# Patient Record
Sex: FEMALE | Race: WHITE | NOT HISPANIC OR LATINO | Employment: OTHER | URBAN - METROPOLITAN AREA
[De-identification: names, ages, dates, MRNs, and addresses within clinical notes are randomized per-mention and may not be internally consistent; named-entity substitution may affect disease eponyms.]

---

## 2017-05-15 ENCOUNTER — HOSPITAL ENCOUNTER (OUTPATIENT)
Dept: RADIOLOGY | Facility: MEDICAL CENTER | Age: 62
Discharge: HOME | End: 2017-05-15
Payer: COMMERCIAL

## 2017-05-15 DIAGNOSIS — Z12.31 ENCOUNTER FOR SCREENING MAMMOGRAM FOR MALIGNANT NEOPLASM OF BREAST: ICD-10-CM

## 2017-05-15 PROCEDURE — G0202 SCR MAMMO BI INCL CAD: HCPCS

## 2017-11-09 ENCOUNTER — ALLSCRIPTS OFFICE VISIT (OUTPATIENT)
Dept: OTHER | Facility: OTHER | Age: 62
End: 2017-11-09

## 2017-11-09 ENCOUNTER — TRANSCRIBE ORDERS (OUTPATIENT)
Dept: ADMINISTRATIVE | Facility: HOSPITAL | Age: 62
End: 2017-11-09

## 2017-11-09 DIAGNOSIS — Z12.39 BREAST SCREENING: Primary | ICD-10-CM

## 2017-11-10 NOTE — PROGRESS NOTES
Assessment    1  Fibrocystic breast disease, unspecified laterality (610 1) (N60 19)   2  Encounter for screening mammogram for malignant neoplasm of breast (V76 12) (Z12 31)    Plan  Encounter for screening mammogram for malignant neoplasm of breast    · * MAMMO SCREENING BILATERAL W CAD; Status:Active; Requested for:[id number]:00AM;    Perform:[facility] Radiology; Order Comments:[address]; JFE:[id number]; Last Updated By:Sample, April; 2017 3:18:10 PM;Ordered;for screening mammogram for malignant neoplasm of breast; Ordered By:Paulino Nath; Fibrocystic breast disease, unspecified laterality    · Follow-up visit in 1 year Evaluation and Treatment  Follow-up  Status: Complete  Done:2018 11:15AM   Ordered;Fibrocystic breast disease, unspecified laterality; Ordered By: Allison Chavez Performed:  Due: 52KRM6065; Last Updated By: Sample, April; 2017 3:21:40 PM    Discussion/Summary  61 yo female with fibrocystic changes  No concerns on exam today  Her last mammogram was benign  I will make arrangements for her next mammogram due in May  I will plan on seeing her on an annual basis or sooner should the need arise  Chief Complaint  Chief Complaint Free Text Note Form: Pt is here for her 1 year breast follow-up  new complaints at this time  imagin/15/17 radha scrn mammo (B2)  Raffaele and Dr Stephanie Kaiser  History of Present Illness  Diagnosis and Staging: fibrocystic changes      Review of Systems  Complete Female ROS SurgOnc:  Constitutional: The patient denies new or recent history of general fatigue, no recent weight loss, no change in appetite  Eyes: No complaints of visual problems, no scleral icterus  ENT: no complaints of ear pain, no hoarseness, no difficulty swallowing,-- no tinnitus-- and-- no new masses in head, oral cavity, or neck  Cardiovascular: No complaints of chest pain, no palpitations, no ankle edema  Respiratory: No complaints of shortness of breath, no cough  Gastrointestinal: No complaints of jaundice, no bloody stools, no pale stools  Genitourinary: No complaints of dysuria, no hematuria, no nocturia, no frequent urination, no urethral discharge  Musculoskeletal: No complaints of weakness, paralysis, joint stiffness or arthralgias,  Integumentary: No complaints of rash, no new lesions  Neurological: No complaints of convulsions, no seizures, no dizziness  Hematologic/Lymphatic: No complaints of easy bruising  Active Problems    1  Acute sinusitis (461 9) (J01 90)   2  Acute URI (465 9) (J06 9)   3  Atypical chest pain (786 59) (R07 89)   4  Chest pain (786 50) (R07 9)   5  Contusion of rib on left side, initial encounter (922 1) (S20 212A)   6  Ear pain (388 70) (H92 09)   7  Encounter for screening mammogram for malignant neoplasm of breast (V76 12) (Z12 31)   8  Fibrocystic breast disease, unspecified laterality (610 1) (N60 19)   9  Rib pain on left side (786 50) (R07 81)   10  Shortness of breath on exertion (786 05) (R06 02)    Past Medical History  1  History of Abnormal findings on diagnostic imaging of breast (793 89) (R92 8)   2  History of Age At First Period 14 Years Old (Menarche)   3  History of Age At First Pregnancy 23 Years Old   4  History of Arthritis (V13 4)   5  History of psoriasis (V13 3) (Z87 2)   6  History of viral conjunctivitis (V12 49) (Z86 69)   7  History of Osteoporosis (733 00) (M81 0)   8  History of Palpitations (785 1) (R00 2)   9  History of Previously Pregnant With 2  Term Deliveries    Surgical History  1  History of Foot Surgery  Surgical History Reviewed: The surgical history was reviewed and updated today  Family History  Mother    1  Family history of No history of cancer  Father    2  Family history of Skin Cancer (V16 8)  Brother    3  Family history of    4  Family history of myocardial infarction (V17 3) (Z82 49)  Maternal Grandfather    5   Family history of Colon Cancer (V16 0)  Maternal Uncle 6  Family history of Lung Cancer (V16 1)   7  Family history of Lung Cancer (V16 1)  Family History Reviewed: The family history was reviewed and updated today  Social History     · Being A Social Drinker   · Never A Smoker   · No drug use  Social History Reviewed: The social history was reviewed and updated today  The social history was reviewed and is unchanged  Current Meds   1  [employer] Vitamin D3 CAPS; Therapy: (Recorded:18Jun2015) to Recorded   2  Fluticasone Propionate 50 MCG/ACT Nasal Suspension; USE 1 SPRAY IN EACH NOSTRIL TWICE DAILY; Therapy: 79Bzy8240 to (Last Rx:[id number])  Requested for: 90Ugg2270 Ordered  Medication List Reviewed: The medication list was reviewed and updated today  Allergies  1  No Known Drug Allergies    Vitals  Vital Signs    Recorded: 57OSV5255 02:43PM   Temperature 97 8 F   Heart Rate 87   Respiration 15   Systolic 697   Diastolic 78   Height 5 ft 4 in   Weight 158 lb    BMI Calculated 27 12   BSA Calculated 1 78   O2 Saturation 99       Physical Exam   Constitutional: General appearance: The Patient is well-developed, well-nourished female who appears her stated age in no acute distress  She is pleasant and talkative  Neuro: Grossly nonfocal  -- Orientation to person, place and time: Normal  -- Mood and affect: Normal    Lymphatic: no evidence of cervical adenopathy bilaterally  -- no evidence of axillary adenopathy bilaterally  Skin: Examination of Breast: Abnormal   Breast: Examination of both breasts revealed fibrocystic changes  Examination of the right breast revealed no erythema,-- no swelling-- and-- no tenderness  Examination of the left breast revealed no erythema,-- no swelling-- and-- no tenderness  Nipples appeared normal No discharge was noted from the nipples  No breast masses were palpable  Axillary nodes: no enlarged nodes        Results/Data  Diagnostic Studies Reviewed Surg Onc:  X-ray Review 5/15/17 bilateral screening mammogram benign; category 2  Future Appointments    Date/Time Provider Specialty Site   11/08/2018 11:15 AM YAS Green  Surgical Oncology [facility]     End of Encounter Meds    1  Fluticasone Propionate 50 MCG/ACT Nasal Suspension; USE 1 SPRAY IN EACH NOSTRIL TWICE DAILY; Therapy: 51Qyr0486 to (Last Rx:[id number])  Requested for: 38Oju0548 Ordered    2  [employer]S Vitamin D3 CAPS;  Therapy: (Recorded:61Hzr1755) to Recorded    Signatures   Electronically signed by : YAS Sanchez ; Nov 9 2017  3:30PM EST                       (Author)

## 2018-01-13 VITALS
HEIGHT: 64 IN | OXYGEN SATURATION: 99 % | RESPIRATION RATE: 15 BRPM | BODY MASS INDEX: 26.98 KG/M2 | TEMPERATURE: 97.8 F | DIASTOLIC BLOOD PRESSURE: 78 MMHG | SYSTOLIC BLOOD PRESSURE: 120 MMHG | WEIGHT: 158 LBS | HEART RATE: 87 BPM

## 2018-01-18 NOTE — PROGRESS NOTES
Assessment    1  Contusion of rib on left side, initial encounter (922 1) (S23 302A)    Plan   Contusion of rib on left side, initial encounter    · Acetaminophen-Codeine 300-30 MG Oral Tablet (Tylenol with Codeine #3); TAKE 1  TABLET 3 TIMES DAILY AS NEEDED FOR PAIN    XR RIBS 2 VIEW LEFT; Status:Resulted - Requires Verification,Retrospective By Protocol Authorization;   Done: 79IJD7454 12:00AM  KQB:[id number];ZHXROLG; For:Rib pain on left side; Ordered By:Binta Fleming;      Discussion/Summary  Discussion Summary:   1  ICE to area 10-15min 3-4x daily  2  May use Tylenol with Codeine 1 every 6-8 hours as needed for pain  3  Encourage deep breathing periodically to prevent pneumonia  4  Follow-up with PCP if symptoms worsen  Medication Side Effects Reviewed: Possible side effects of new medications were reviewed with the patient/guardian today  Understands and agrees with treatment plan: The treatment plan was reviewed with the patient/guardian  The patient/guardian understands and agrees with the treatment plan      Chief Complaint    1  Pain  Chief Complaint Free Text Note Form: slipped today in parking lot on wet cardboard, fell on left side landed on left arm, has pain under left breast      History of Present Illness  HPI: Patient is a 50-year-old female presents with left rib and chest pain after a fall or this morning  She states she slipped on a piece of cardboard fell on her left side, she complained of immediate pain in her left rib and chest areas  The patient denies any shortness of breath or difficulty breathing  Hospital Based Practices Required Assessment:   Pain Assessment   the patient states they have pain  The pain is located in the left chest wall  The patient describes the pain as dull and aching  (on a scale of 0 to 10, the patient rates the pain at 8 )   Abuse And Domestic Violence Screen    Yes, the patient is safe at home  The patient states no one is hurting them      Depression And Suicide Screen  No, the patient has not had thoughts of hurting themself  Yes, the patient has felt depressed in the past 7 days  Prefered Language is  english  Primary Language is  english  Readiness To Learn: Receptive  Barriers To Learning: none  Preferred Learning: verbal   Education Completed: equipment/supplies   Teaching Method: verbal   Person Taught: patient   Evaluation Of Learning: verbalized/demonstrated understanding      Review of Systems  Focused-Female:   Constitutional: No fever, no chills, feels well, no tiredness, no recent weight gain or loss  Respiratory: no shortness of breath, no cough and no shortness of breath during exertion  Musculoskeletal: no arthralgias, no joint swelling, no myalgias and no joint stiffness  Integumentary: no skin lesions and no skin wound  Active Problems    1  Acute sinusitis (461 9) (J01 90)   2  Acute URI (465 9) (J06 9)   3  Encounter for screening mammogram for malignant neoplasm of breast (V76 12)   (Z12 31)   4  Fibrocystic breast disease, unspecified laterality (610 1) (N60 19)    Past Medical History    1  History of Abnormal findings on diagnostic imaging of breast (793 89) (R92 8)   2  History of Age At First Period 14 Years Old (Menarche)   3  History of Age At First Pregnancy 23 Years Old   4  History of Arthritis (V13 4)   5  History of psoriasis (V13 3) (Z87 2)   6  History of viral conjunctivitis (V12 49) (Z86 69)   7  History of Osteoporosis (733 00) (M81 0)   8  History of Palpitations (785 1) (R00 2)   9  History of Previously Pregnant With 2  Term Deliveries  Active Problems And Past Medical History Reviewed: The active problems and past medical history were reviewed and updated today  Family History    1  Family history of No history of cancer    2  Family history of Skin Cancer (V16 8)    3  Family history of    4  Family history of myocardial infarction (V17 3) (Z82 49)    5   Family history of Colon Cancer (V16 0)    6  Family history of Lung Cancer (V16 1)   7  Family history of Lung Cancer (V16 1)  Family History Reviewed: The family history was reviewed and updated today  Social History    · Being A Social Drinker   · Never A Smoker   · No drug use  Social History Reviewed: The social history was reviewed and updated today  Surgical History    1  History of Foot Surgery  Surgical History Reviewed: The surgical history was reviewed and updated today  Current Meds   1  CVS Vitamin D3 CAPS; Therapy: (Recorded:18Jun2015) to Recorded   2  Magnesium TABS; Therapy: (Recorded:16Jan2016) to Recorded  Medication List Reviewed: The medication list was reviewed and updated today  Allergies    1  No Known Drug Allergies    Vitals  Signs [Data Includes: Current Encounter]   Recorded: 61CEQ1738 12:16PM   Temperature: 97 6 F  Heart Rate: 101  Respiration: 18  Systolic: 387  Diastolic: 99  Height: 5 ft 4 in  Weight: 152 lb   BMI Calculated: 26 09  BSA Calculated: 1 74  O2 Saturation: 99  Pain Scale: 8/10    Physical Exam    Constitutional   General appearance: No acute distress, well appearing and well nourished  Ears, Nose, Mouth, and Throat   External inspection of ears and nose: Normal     Otoscopic examination: Tympanic membranes translucent with normal light reflex  Canals patent without erythema  Nasal mucosa, septum, and turbinates: Normal without edema or erythema  Oropharynx: Normal with no erythema, edema, exudate or lesions  Pulmonary   Respiratory effort: No increased work of breathing or signs of respiratory distress  Auscultation of lungs: Clear to auscultation  Cardiovascular   Auscultation of heart: Normal rate and rhythm, normal S1 and S2, without murmurs  Musculoskeletal   Inspection/palpation of joints, bones, and muscles: Abnormal   Tenderness to palpation over the sternum and rib areas 6 and 7, no palpable crepitus or visible swelling  Results/Data  Diagnostic Studies Reviewed: I personally reviewed the films/images/results in the office today  My interpretation follows  X-ray Review X-ray the rib area reveals no acute fractures, results were confirmed by radiology  Future Appointments    Date/Time Provider Specialty Site   07/11/2016 08:15 AM YAS Bee   Surgical Oncology [facility]     Signatures   Electronically signed by : Adry Moctezuma, [facility]; Jan 16 2016  1:47PM EST                       (Author)    Electronically signed by : VLADIMIR Bales ; Jan 18 2016 10:44AM EST                       (Co-author)

## 2018-02-28 ENCOUNTER — OPTICAL OFFICE (OUTPATIENT)
Dept: URBAN - METROPOLITAN AREA CLINIC 146 | Facility: CLINIC | Age: 63
Setting detail: OPHTHALMOLOGY
End: 2018-02-28
Payer: COMMERCIAL

## 2018-02-28 ENCOUNTER — RX ONLY (RX ONLY)
Age: 63
End: 2018-02-28

## 2018-02-28 ENCOUNTER — DOCTOR'S OFFICE (OUTPATIENT)
Dept: URBAN - METROPOLITAN AREA CLINIC 137 | Facility: CLINIC | Age: 63
Setting detail: OPHTHALMOLOGY
End: 2018-02-28
Payer: COMMERCIAL

## 2018-02-28 DIAGNOSIS — H52.4: ICD-10-CM

## 2018-02-28 DIAGNOSIS — H52.223: ICD-10-CM

## 2018-02-28 PROCEDURE — 92004 COMPRE OPH EXAM NEW PT 1/>: CPT | Performed by: OPHTHALMOLOGY

## 2018-02-28 PROCEDURE — V2744 TINT PHOTOCHROMATIC LENS/ES: HCPCS | Performed by: OPHTHALMOLOGY

## 2018-02-28 PROCEDURE — V2025 EYEGLASSES DELUX FRAMES: HCPCS | Performed by: OPHTHALMOLOGY

## 2018-02-28 PROCEDURE — V2799 MISC VISION ITEM OR SERVICE: HCPCS | Performed by: OPHTHALMOLOGY

## 2018-02-28 ASSESSMENT — SPHEQUIV_DERIVED
OS_SPHEQUIV: 1.75
OD_SPHEQUIV: 1.875

## 2018-02-28 ASSESSMENT — CONFRONTATIONAL VISUAL FIELD TEST (CVF)
OS_FINDINGS: FULL
OD_FINDINGS: FULL

## 2018-02-28 ASSESSMENT — KERATOMETRY
OS_AXISANGLE_DEGREES: 042
OS_K2POWER_DIOPTERS: 44.00
OD_K1POWER_DIOPTERS: 42.00
OS_K1POWER_DIOPTERS: 43.75
OD_K2POWER_DIOPTERS: 44.00
OD_AXISANGLE_DEGREES: 093

## 2018-02-28 ASSESSMENT — REFRACTION_MANIFEST
OS_VA3: 20/
OU_VA: 20/
OD_VA1: 20/
OU_VA: 20/
OD_VA3: 20/
OS_VA3: 20/
OD_VA2: 20/
OS_VA2: 20/
OS_VA1: 20/
OD_VA1: 20/
OS_VA1: 20/
OD_VA3: 20/
OD_VA2: 20/
OS_VA2: 20/

## 2018-02-28 ASSESSMENT — REFRACTION_CURRENTRX
OS_VPRISM_DIRECTION: BF
OS_OVR_VA: 20/
OD_OVR_VA: 20/
OD_AXIS: 173
OD_CYLINDER: +0.50
OS_OVR_VA: 20/
OD_OVR_VA: 20/
OD_VPRISM_DIRECTION: BF
OS_OVR_VA: 20/
OD_OVR_VA: 20/
OS_AXIS: 176
OD_SPHERE: +2.75
OS_CYLINDER: +0.75
OS_SPHERE: +0.75

## 2018-02-28 ASSESSMENT — REFRACTION_OUTSIDERX
OU_VA: 20/20-1
OS_AXIS: 155
OS_ADD: +2.25
OD_AXIS: 180
OS_CYLINDER: +0.50
OS_VA2: 20/25(J1)
OS_SPHERE: +1.50
OD_VA1: 20/20-2
OS_VA3: 20/
OD_SPHERE: +1.50
OS_VA1: 20/30-2
OD_CYLINDER: +0.50
OD_VA3: 20/
OD_ADD: +2.25
OD_VA2: 20/25(J1)

## 2018-02-28 ASSESSMENT — REFRACTION_AUTOREFRACTION
OS_SPHERE: +1.50
OS_AXIS: 153
OD_SPHERE: +1.50
OS_CYLINDER: +0.50
OD_AXIS: 087
OD_CYLINDER: +0.75

## 2018-02-28 ASSESSMENT — AXIALLENGTH_DERIVED
OD_AL: 23.0574
OS_AL: 22.7999

## 2018-02-28 ASSESSMENT — CORNEAL PTERYGIUM: OD_PTERYGIUM: NASAL 2MM

## 2018-02-28 ASSESSMENT — VISUAL ACUITY
OD_BCVA: 20/40
OS_BCVA: 20/40

## 2018-03-16 ENCOUNTER — DOCTOR'S OFFICE (OUTPATIENT)
Dept: URBAN - METROPOLITAN AREA CLINIC 137 | Facility: CLINIC | Age: 63
Setting detail: OPHTHALMOLOGY
End: 2018-03-16
Payer: COMMERCIAL

## 2018-03-16 DIAGNOSIS — H52.4: ICD-10-CM

## 2018-03-16 PROCEDURE — NCRX N/C GLASSES RX: Performed by: OPHTHALMOLOGY

## 2018-03-16 ASSESSMENT — REFRACTION_OUTSIDERX
OS_AXIS: 180
OD_SPHERE: +1.00
OD_ADD: +2.25
OD_CYLINDER: +0.50
OS_VA3: 20/
OD_VA2: 20/25(J1)
OS_VA1: 20/30-2
OD_VA1: 20/20-2
OD_VA2: 20/25(J1)
OS_VA1: 20/25
OS_SPHERE: +1.50
OD_VA3: 20/
OD_AXIS: 180
OS_VA2: 20/25(J1)
OS_AXIS: 155
OD_CYLINDER: +0.50
OS_ADD: +2.25
OS_CYLINDER: +0.50
OU_VA: 20/20
OD_VA3: 20/
OD_AXIS: 180
OS_VA3: 20/
OD_VA1: 20/25
OD_ADD: +2.25
OU_VA: 20/20-1
OS_CYLINDER: +0.50
OS_ADD: +2.25
OD_SPHERE: +1.50
OS_VA2: 20/25(J1)
OS_SPHERE: +1.50

## 2018-03-16 ASSESSMENT — REFRACTION_CURRENTRX
OD_OVR_VA: 20/
OD_OVR_VA: 20/
OS_OVR_VA: 20/
OS_AXIS: 176
OS_SPHERE: +0.75
OD_AXIS: 173
OD_CYLINDER: +0.50
OD_OVR_VA: 20/
OS_OVR_VA: 20/
OS_OVR_VA: 20/
OD_SPHERE: +2.75
OD_VPRISM_DIRECTION: BF
OS_VPRISM_DIRECTION: BF
OS_CYLINDER: +0.75

## 2018-03-16 ASSESSMENT — VISUAL ACUITY
OD_BCVA: 20/30-2
OS_BCVA: 20/30

## 2018-03-16 ASSESSMENT — REFRACTION_AUTOREFRACTION
OS_CYLINDER: +0.50
OS_SPHERE: +1.50
OD_SPHERE: +1.50
OD_CYLINDER: +0.75
OS_AXIS: 153
OD_AXIS: 087

## 2018-03-16 ASSESSMENT — REFRACTION_MANIFEST
OS_VA1: 20/
OS_VA2: 20/
OD_VA1: 20/
OD_VA3: 20/
OS_VA3: 20/
OU_VA: 20/
OD_VA2: 20/

## 2018-03-16 ASSESSMENT — SPHEQUIV_DERIVED
OD_SPHEQUIV: 1.875
OS_SPHEQUIV: 1.75

## 2018-03-30 ENCOUNTER — DOCTOR'S OFFICE (OUTPATIENT)
Dept: URBAN - METROPOLITAN AREA CLINIC 137 | Facility: CLINIC | Age: 63
Setting detail: OPHTHALMOLOGY
End: 2018-03-30
Payer: COMMERCIAL

## 2018-03-30 DIAGNOSIS — G43.909: ICD-10-CM

## 2018-03-30 PROCEDURE — 92083 EXTENDED VISUAL FIELD XM: CPT | Performed by: OPHTHALMOLOGY

## 2018-04-19 ENCOUNTER — DOCTOR'S OFFICE (OUTPATIENT)
Dept: URBAN - METROPOLITAN AREA CLINIC 137 | Facility: CLINIC | Age: 63
Setting detail: OPHTHALMOLOGY
End: 2018-04-19
Payer: COMMERCIAL

## 2018-04-19 DIAGNOSIS — H52.4: ICD-10-CM

## 2018-04-19 PROBLEM — G43.909 MIGRAINE, UNSPECIFIED, NOT INTRACTABLE, WITHOUT STATUS MIGRAINOSUS: Status: ACTIVE | Noted: 2018-02-28

## 2018-04-19 PROBLEM — H25.13 CATARACT NUCLEAR SCLEROSIS; BOTH EYES: Status: ACTIVE | Noted: 2018-02-28

## 2018-04-19 PROBLEM — H11.061 PTERYGIUM; RIGHT EYE: Status: ACTIVE | Noted: 2018-02-28

## 2018-04-19 PROCEDURE — NCRX N/C GLASSES RX: Performed by: OPHTHALMOLOGY

## 2018-04-19 ASSESSMENT — REFRACTION_CURRENTRX
OD_OVR_VA: 20/
OD_SPHERE: +2.75
OS_OVR_VA: 20/
OS_OVR_VA: 20/
OS_CYLINDER: +0.75
OD_OVR_VA: 20/
OS_OVR_VA: 20/
OD_CYLINDER: +0.50
OD_OVR_VA: 20/
OS_AXIS: 176
OS_SPHERE: +0.75
OD_AXIS: 173
OS_VPRISM_DIRECTION: BF
OD_VPRISM_DIRECTION: BF

## 2018-04-19 ASSESSMENT — REFRACTION_OUTSIDERX
OU_VA: 20/25
OS_VA3: 20/
OS_ADD: +2.25
OD_VA3: 20/
OS_AXIS: 180
OS_SPHERE: +1.50
OS_SPHERE: +1.50
OD_AXIS: 180
OS_ADD: +1.75
OS_AXIS: 180
OD_VA1: 20/30+2
OD_VA1: 20/25
OU_VA: 20/20
OS_VA1: 20/25
OS_CYLINDER: +0.50
OS_VA3: 20/
OD_VA2: 20/25(J1)
OD_CYLINDER: +0.50
OS_VA1: 20/30
OD_VA3: 20/
OS_VA2: 20/25(J1)
OD_AXIS: 180
OS_CYLINDER: +0.50
OD_CYLINDER: +0.50
OD_VA2: 20/25(J1)
OD_ADD: +1.75
OD_ADD: +2.25
OD_SPHERE: +1.00
OD_SPHERE: +1.50
OS_VA2: 20/25(J1)

## 2018-04-19 ASSESSMENT — REFRACTION_MANIFEST
OU_VA: 20/
OS_VA2: 20/
OD_VA2: 20/
OD_VA1: 20/
OD_VA3: 20/
OS_VA1: 20/
OS_VA3: 20/

## 2018-04-19 ASSESSMENT — REFRACTION_AUTOREFRACTION
OD_CYLINDER: +0.75
OS_AXIS: 153
OS_SPHERE: +1.50
OD_SPHERE: +1.50
OD_AXIS: 087
OS_CYLINDER: +0.50

## 2018-04-19 ASSESSMENT — VISUAL ACUITY
OD_BCVA: 20/40-2
OS_BCVA: 20/30-2

## 2018-04-19 ASSESSMENT — SPHEQUIV_DERIVED
OS_SPHEQUIV: 1.75
OD_SPHEQUIV: 1.875

## 2018-05-21 DIAGNOSIS — Z12.31 ENCOUNTER FOR SCREENING MAMMOGRAM FOR MALIGNANT NEOPLASM OF BREAST: ICD-10-CM

## 2018-05-29 ENCOUNTER — HOSPITAL ENCOUNTER (OUTPATIENT)
Dept: RADIOLOGY | Facility: MEDICAL CENTER | Age: 63
Discharge: HOME | End: 2018-05-29
Payer: COMMERCIAL

## 2018-05-29 DIAGNOSIS — Z12.39 BREAST SCREENING: ICD-10-CM

## 2018-05-29 PROCEDURE — 77067 SCR MAMMO BI INCL CAD: CPT

## 2018-11-08 ENCOUNTER — OFFICE VISIT (OUTPATIENT)
Dept: SURGICAL ONCOLOGY | Facility: CLINIC | Age: 63
End: 2018-11-08
Payer: COMMERCIAL

## 2018-11-08 VITALS
DIASTOLIC BLOOD PRESSURE: 80 MMHG | HEART RATE: 75 BPM | HEIGHT: 64 IN | SYSTOLIC BLOOD PRESSURE: 126 MMHG | RESPIRATION RATE: 18 BRPM | WEIGHT: 156 LBS | BODY MASS INDEX: 26.63 KG/M2 | TEMPERATURE: 98.5 F

## 2018-11-08 DIAGNOSIS — Z12.31 SCREENING MAMMOGRAM, ENCOUNTER FOR: Primary | ICD-10-CM

## 2018-11-08 PROCEDURE — 99213 OFFICE O/P EST LOW 20 MIN: CPT | Performed by: SURGERY

## 2018-11-08 RX ORDER — RIBOFLAVIN (VITAMIN B2) 100 MG
100 TABLET ORAL DAILY
COMMUNITY
End: 2021-08-05 | Stop reason: ALTCHOICE

## 2018-11-08 RX ORDER — CHLORAL HYDRATE 500 MG
1000 CAPSULE ORAL DAILY
COMMUNITY
End: 2021-08-05 | Stop reason: ALTCHOICE

## 2018-11-08 NOTE — PROGRESS NOTES
Surgical Oncology Follow Up       [address] [place] [address]  [facility] [address]  1955  [id number]  [address] [place] [address]  [facility] [place] [id number]    Chief Complaint   Patient presents with    Breast Problem     Pt is here for 1 year follow up       Assessment/Plan   Diagnoses and all orders for this visit:    Screening mammogram, encounter for  -     Mammo screening bilateral w cad; Future    Other orders  -     Cholecalciferol (CVS VITAMIN D3) 1000 units capsule; Take by mouth  -     Ascorbic Acid (VITAMIN C) 100 MG tablet; Take 100 mg by mouth daily  -     Omega-3 Fatty Acids (FISH OIL) 1,000 mg; Take 1,000 mg by mouth daily        Advance Care Planning/Advance Directives:  Did not discuss  with the patient  Oncology History:     No history exists  History of Present Illness: follow up   -Interval History: none    Review of Systems:  Review of Systems   Constitutional: Negative  Negative for appetite change and fever  Eyes: Negative  Respiratory: Negative for shortness of breath  Cardiovascular: Negative  Gastrointestinal: Negative  Endocrine: Negative  Genitourinary: Negative  Musculoskeletal: Negative  Negative for arthralgias and myalgias  Skin: Positive for wound (bee sting on left breast)  Allergic/Immunologic: Negative  Neurological: Negative  Hematological: Negative  Negative for adenopathy  Does not bruise/bleed easily  Psychiatric/Behavioral: Negative          Patient Active Problem List   Diagnosis    Fibrocystic breast disease    Screening mammogram, encounter for     Past Medical History:   Diagnosis Date    Acute URI     Arthritis     Atypical chest pain     Fibrocystic breast disease     Osteoporosis     Palpitations     Psoriasis     Rib pain on left side     Viral conjunctivitis      Past Surgical History:   Procedure Laterality Date    FOOT SURGERY       Family History   Problem Relation Age of Onset    Skin cancer Father         age dx unk    [facility] Lung cancer Maternal Grandfather         age dx unk      Social History     Social History    Marital status: /Civil Union     Spouse name: N/A    Number of children: N/A    Years of education: N/A     Occupational History    Not on file  Social History Main Topics    Smoking status: Never Smoker    Smokeless tobacco: Never Used    Alcohol use Yes      Comment: social     Drug use: Unknown    Sexual activity: Not on file     Other Topics Concern    Not on file     Social History Narrative    No narrative on file       Current Outpatient Prescriptions:     Ascorbic Acid (VITAMIN C) 100 MG tablet, Take 100 mg by mouth daily, Disp: , Rfl:     Cholecalciferol (CVS VITAMIN D3) 1000 units capsule, Take by mouth, Disp: , Rfl:     Omega-3 Fatty Acids (FISH OIL) 1,000 mg, Take 1,000 mg by mouth daily, Disp: , Rfl:   No Known Allergies    The following portions of the patient's history were reviewed and updated as appropriate: allergies, current medications, past family history, past medical history, past social history, past surgical history and problem list         Vitals:    11/08/18 1105   BP: 126/80   Pulse: 75   Resp: 18   Temp: 98 5 °F (36 9 °C)       Physical Exam   Constitutional: She is oriented to person, place, and time  She appears well-developed and well-nourished  HENT:   Head: Normocephalic and atraumatic  Pulmonary/Chest: Right breast exhibits no inverted nipple, no mass, no nipple discharge, no skin change and no tenderness  Left breast exhibits skin change (resolving lesion from recent bee sting)  Left breast exhibits no inverted nipple, no mass, no nipple discharge and no tenderness  Lymphadenopathy:        Right axillary: No pectoral and no lateral adenopathy present          Left axillary: No pectoral and no lateral adenopathy present  Right: No supraclavicular adenopathy present  Left: No supraclavicular adenopathy present  Neurological: She is alert and oriented to person, place, and time  Psychiatric: She has a normal mood and affect  Results:      Imaging  05/29/2018 bilateral screening mammogram is benign BI-RADS two with a density of one    I reviewed the above imaging data  Discussion/Summary:  69-year-old female with a history of fibrocystic changes of the breast   There are no concerns on examination today  Her last mammogram was benign  I will therefore see her again in one year for another clinical exam or sooner should the need arise

## 2019-06-10 ENCOUNTER — HOSPITAL ENCOUNTER (OUTPATIENT)
Dept: RADIOLOGY | Facility: MEDICAL CENTER | Age: 64
Discharge: HOME | End: 2019-06-10
Payer: COMMERCIAL

## 2019-06-10 VITALS — BODY MASS INDEX: 26.63 KG/M2 | HEIGHT: 64 IN | WEIGHT: 156 LBS

## 2019-06-10 DIAGNOSIS — Z12.31 SCREENING MAMMOGRAM, ENCOUNTER FOR: ICD-10-CM

## 2019-06-10 PROCEDURE — 77067 SCR MAMMO BI INCL CAD: CPT

## 2019-12-12 ENCOUNTER — OFFICE VISIT (OUTPATIENT)
Dept: SURGICAL ONCOLOGY | Facility: CLINIC | Age: 64
End: 2019-12-12
Payer: MEDICARE

## 2019-12-12 VITALS
TEMPERATURE: 97.9 F | DIASTOLIC BLOOD PRESSURE: 70 MMHG | SYSTOLIC BLOOD PRESSURE: 122 MMHG | HEIGHT: 64 IN | WEIGHT: 153 LBS | RESPIRATION RATE: 18 BRPM | HEART RATE: 61 BPM | BODY MASS INDEX: 26.12 KG/M2

## 2019-12-12 DIAGNOSIS — N60.19 FIBROCYSTIC BREAST DISEASE (FCBD), UNSPECIFIED LATERALITY: Primary | ICD-10-CM

## 2019-12-12 DIAGNOSIS — Z12.31 SCREENING MAMMOGRAM, ENCOUNTER FOR: ICD-10-CM

## 2019-12-12 PROCEDURE — 99213 OFFICE O/P EST LOW 20 MIN: CPT | Performed by: SURGERY

## 2019-12-12 NOTE — PROGRESS NOTES
Surgical Oncology Follow Up       [address] [facility]  [facility] [place]  [address] [facility] [place]  [place] [address]    Jannie Katty  1955  [id number]  [address]  [facility] [place]  [address] [facility] [place] [address]    Chief Complaint   Patient presents with    Follow-up       Assessment/Plan   Diagnoses and all orders for this visit:    Fibrocystic breast disease (FCBD), unspecified laterality    Screening mammogram, encounter for  -     Mammo screening bilateral w 3d & cad; Future    Other orders  -     Garlic 10 MG CAPS; Take by mouth        Advance Care Planning/Advance Directives:  Did not discuss  with the patient  Oncology History:     No history exists  History of Present Illness: follow up visit secondary to fibrocystic changes, no concerns  -Interval History:none    Review of Systems:  Review of Systems   Constitutional: Negative  Negative for appetite change and fever  Eyes: Negative  Respiratory: Negative for shortness of breath  Cardiovascular: Negative  Gastrointestinal: Negative  Endocrine: Negative  Genitourinary: Negative  Musculoskeletal: Negative  Negative for arthralgias and myalgias  Skin: Negative  Allergic/Immunologic: Negative  Neurological: Negative  Hematological: Negative  Negative for adenopathy  Does not bruise/bleed easily  Psychiatric/Behavioral: Negative          Patient Active Problem List   Diagnosis    Fibrocystic breast disease    Screening mammogram, encounter for     Past Medical History:   Diagnosis Date    Acute URI     Arthritis     Atypical chest pain     Fibrocystic breast disease     Osteoporosis     Palpitations     Psoriasis     Rib pain on left side     Viral conjunctivitis      Past Surgical History:   Procedure Laterality Date    FOOT SURGERY       Family History   Problem Relation Age of Onset    Skin cancer Father age dx unk    [facility] Lung cancer Maternal Grandfather         age dx unk     No Known Problems Mother     No Known Problems Daughter     No Known Problems Maternal Grandmother     No Known Problems Paternal Grandmother     No Known Problems Paternal Grandfather     No Known Problems Maternal Aunt     No Known Problems Maternal Aunt     No Known Problems Maternal Aunt     No Known Problems Cousin     No Known Problems Cousin     No Known Problems Paternal Aunt     No Known Problems Cousin     No Known Problems Cousin      Social History     Socioeconomic History    Marital status: /Civil Union     Spouse name: Not on file    Number of children: Not on file    Years of education: Not on file    Highest education level: Not on file   Occupational History    Not on file   Social Needs    Financial resource strain: Not on file    Food insecurity:     Worry: Not on file     Inability: Not on file    Transportation needs:     Medical: Not on file     Non-medical: Not on file   Tobacco Use    Smoking status: Never Smoker    Smokeless tobacco: Never Used   Substance and Sexual Activity    Alcohol use: Yes     Comment: social     Drug use: Not on file    Sexual activity: Not on file   Lifestyle    Physical activity:     Days per week: Not on file     Minutes per session: Not on file    Stress: Not on file   Relationships    Social connections:     Talks on phone: Not on file     Gets together: Not on file     Attends Anabaptism service: Not on file     Active member of club or organization: Not on file     Attends meetings of clubs or organizations: Not on file     Relationship status: Not on file    Intimate partner violence:     Fear of current or ex partner: Not on file     Emotionally abused: Not on file     Physically abused: Not on file     Forced sexual activity: Not on file   Other Topics Concern    Not on file   Social History Narrative    Not on file       Current Outpatient Medications:     Cholecalciferol (CVS VITAMIN D3) 1000 units capsule, Take by mouth, Disp: , Rfl:     Garlic 10 MG CAPS, Take by mouth, Disp: , Rfl:     Omega-3 Fatty Acids (FISH OIL) 1,000 mg, Take 1,000 mg by mouth daily, Disp: , Rfl:     Ascorbic Acid (VITAMIN C) 100 MG tablet, Take 100 mg by mouth daily, Disp: , Rfl:   No Known Allergies    The following portions of the patient's history were reviewed and updated as appropriate: allergies, current medications, past family history, past medical history, past social history, past surgical history and problem list         Vitals:    12/12/19 0817   BP: 122/70   Pulse: 61   Resp: 18   Temp: 97 9 °F (36 6 °C)       Physical Exam   Constitutional: She is oriented to person, place, and time  She appears well-developed and well-nourished  HENT:   Head: Normocephalic and atraumatic  Pulmonary/Chest: Right breast exhibits no inverted nipple, no mass, no nipple discharge, no skin change and no tenderness  Left breast exhibits no inverted nipple, no mass, no nipple discharge, no skin change and no tenderness  Lymphadenopathy:        Right axillary: No pectoral and no lateral adenopathy present  Left axillary: No pectoral and no lateral adenopathy present  Right: No supraclavicular adenopathy present  Left: No supraclavicular adenopathy present  Neurological: She is alert and oriented to person, place, and time  Psychiatric: She has a normal mood and affect  Results:  Labs:      Imaging  06/10/2019 bilateral 3D screening mammogram is benign BI-RADS two with a density of two    I reviewed the above imaging data  Discussion/Summary:  69-year-old female with fibrocystic changes of the breast   There are no concerns on examination today  Her last mammogram was benign  I will continue to see her on an annual basis or sooner should the need arise

## 2020-06-11 ENCOUNTER — HOSPITAL ENCOUNTER (OUTPATIENT)
Dept: RADIOLOGY | Facility: MEDICAL CENTER | Age: 65
Discharge: HOME | End: 2020-06-11
Payer: MEDICARE

## 2020-06-11 VITALS — HEIGHT: 64 IN | WEIGHT: 153 LBS | BODY MASS INDEX: 26.12 KG/M2

## 2020-06-11 DIAGNOSIS — Z12.31 SCREENING MAMMOGRAM, ENCOUNTER FOR: ICD-10-CM

## 2020-06-11 PROCEDURE — 77067 SCR MAMMO BI INCL CAD: CPT

## 2020-06-11 PROCEDURE — 77063 BREAST TOMOSYNTHESIS BI: CPT

## 2020-07-22 ENCOUNTER — APPOINTMENT (EMERGENCY)
Dept: RADIOLOGY | Facility: HOSPITAL | Age: 65
End: 2020-07-22
Payer: MEDICARE

## 2020-07-22 ENCOUNTER — HOSPITAL ENCOUNTER (EMERGENCY)
Facility: HOSPITAL | Age: 65
Discharge: HOME | End: 2020-07-22
Attending: EMERGENCY MEDICINE | Admitting: EMERGENCY MEDICINE
Payer: MEDICARE

## 2020-07-22 VITALS
DIASTOLIC BLOOD PRESSURE: 77 MMHG | TEMPERATURE: 99.3 F | SYSTOLIC BLOOD PRESSURE: 167 MMHG | RESPIRATION RATE: 18 BRPM | OXYGEN SATURATION: 98 % | HEART RATE: 87 BPM

## 2020-07-22 DIAGNOSIS — S82.001A RIGHT PATELLA FRACTURE: Primary | ICD-10-CM

## 2020-07-22 PROCEDURE — 73564 X-RAY EXAM KNEE 4 OR MORE: CPT

## 2020-07-22 PROCEDURE — 99283 EMERGENCY DEPT VISIT LOW MDM: CPT

## 2020-07-22 PROCEDURE — 99284 EMERGENCY DEPT VISIT MOD MDM: CPT | Performed by: EMERGENCY MEDICINE

## 2020-07-23 ENCOUNTER — OFFICE VISIT (OUTPATIENT)
Dept: OBGYN CLINIC | Facility: OTHER | Age: 65
End: 2020-07-23
Payer: MEDICARE

## 2020-07-23 VITALS
HEIGHT: 64 IN | BODY MASS INDEX: 26.26 KG/M2 | TEMPERATURE: 98.2 F | DIASTOLIC BLOOD PRESSURE: 86 MMHG | SYSTOLIC BLOOD PRESSURE: 159 MMHG | HEART RATE: 83 BPM

## 2020-07-23 DIAGNOSIS — M25.561 ACUTE PAIN OF RIGHT KNEE: ICD-10-CM

## 2020-07-23 DIAGNOSIS — S82.034A CLOSED NONDISPLACED TRANSVERSE FRACTURE OF RIGHT PATELLA, INITIAL ENCOUNTER: Primary | ICD-10-CM

## 2020-07-23 PROCEDURE — 99203 OFFICE O/P NEW LOW 30 MIN: CPT | Performed by: ORTHOPAEDIC SURGERY

## 2020-07-23 PROCEDURE — 27520 TREAT KNEECAP FRACTURE: CPT | Performed by: ORTHOPAEDIC SURGERY

## 2020-07-23 NOTE — PROGRESS NOTES
Assessment  Diagnoses and all orders for this visit:    Closed nondisplaced transverse fracture of right patella, initial encounter    Acute pain of right knee          Discussion and Plan:  The patient has an examination and x-ray consistent with a patella fracture  I have discussed with x-rays findings with the patient and her   Treatment options were discussed at length and after discussing these treatment options it was decided that we will treat this fracture conservatively  Fracture is stable on x-ray and patient extensor mechanism is intact on exam today  She was placed in aT-rom set at 0-15 degrees today  She was instructed to increase by 15 degrees by every 2 weeks  WB as tolerated in the brace with crutches for support and then discontinuation of the crutches when she feels she does not require any further support  Subjective:   Patient ID: Vignesh Mcleod is a 71 y o  female      HPI  The patient presents with a chief complaint of right knee pain  The pain began yesterday (7/22/20) and is associated with an acute injury  Patient states she slipped on a wet floor striking her anterior knee on concrete  The patient describes the pain as aching and dull  It is intermittent in timing, and localizes the pain to the patella  She was seen in the ED where x-rays showed patella fracture  She was placed in a knee immobilizer and referred here today for follow up  The following portions of the patient's history were reviewed and updated as appropriate: allergies, current medications, past family history, past medical history, past social history, past surgical history and problem list     Review of Systems   Constitutional: Negative for chills and fever  HENT: Negative for drooling and sneezing  Eyes: Negative for redness  Respiratory: Negative for cough and wheezing  Gastrointestinal: Negative for nausea and vomiting     Musculoskeletal:        Please see ortho exam Psychiatric/Behavioral: Negative for behavioral problems  The patient is not nervous/anxious  Objective:  /86   Pulse 83   Temp 98 2 °F (36 8 °C)   Ht 5' 4" (1 626 m)   BMI 26 26 kg/m²       Right Knee Exam     Tenderness   The patient is experiencing tenderness in the patella  Range of Motion   Extension: 0   Flexion: 90     Tests   Varus: negative Valgus: negative    Other   Erythema: absent  Sensation: normal  Pulse: present  Swelling: none  Effusion: no effusion present            Physical Exam   Constitutional: She is oriented to person, place, and time  She appears well-developed and well-nourished  Eyes: Pupils are equal, round, and reactive to light  Pulmonary/Chest: Effort normal and breath sounds normal    Musculoskeletal:        Right knee: She exhibits no effusion  Neurological: She is alert and oriented to person, place, and time  Skin: Skin is warm and dry  Psychiatric: She has a normal mood and affect  Her behavior is normal  Judgment and thought content normal    Vitals reviewed  I have personally reviewed pertinent films in PACS and my interpretation is as follows    Right knee x-rays demonstrates non displace fracture inferior pole of the patella    Fracture / Dislocation Treatment  Date/Time: 7/23/2020 12:26 PM  Performed by: Nahid Gunn MD  Authorized by: Nahid Gunn MD     Injury location:  Knee  Location details:  Right knee  Injury type:  Fracture  Fracture type: patellar    Manipulation performed?: No    Immobilization:  Brace          Scribe Attestation    I,:   Travis Gerardo am acting as a scribe while in the presence of the attending physician :        I,:   Nahid Gunn MD personally performed the services described in this documentation    as scribed in my presence :

## 2020-07-23 NOTE — LETTER
July 23, 2020     Patient: Rex Davidson   YOB: 1955   Date of Visit: 7/23/2020       To Whom it May Concern:    Rex Davidson is under my professional care  She was seen in my office on 7/23/2020  She may return to work 7/27/20  If you have any questions or concerns, please don't hesitate to call           Sincerely,          Reid Fitch MD        CC: No Recipients

## 2020-07-27 ENCOUNTER — TELEPHONE (OUTPATIENT)
Dept: OBGYN CLINIC | Facility: OTHER | Age: 65
End: 2020-07-27

## 2020-07-27 NOTE — TELEPHONE ENCOUNTER
1/2 days for 2 weeks then full days starting August 10th  Faxed to Ericka's attention and sent to patient via [employer]  Questions answered  No further action required

## 2020-09-03 ENCOUNTER — OFFICE VISIT (OUTPATIENT)
Dept: OBGYN CLINIC | Facility: OTHER | Age: 65
End: 2020-09-03

## 2020-09-03 ENCOUNTER — APPOINTMENT (OUTPATIENT)
Dept: RADIOLOGY | Facility: OTHER | Age: 65
End: 2020-09-03
Payer: MEDICARE

## 2020-09-03 VITALS
HEIGHT: 64 IN | HEART RATE: 62 BPM | SYSTOLIC BLOOD PRESSURE: 156 MMHG | WEIGHT: 162 LBS | DIASTOLIC BLOOD PRESSURE: 85 MMHG | BODY MASS INDEX: 27.66 KG/M2

## 2020-09-03 DIAGNOSIS — S82.034D CLOSED NONDISPLACED TRANSVERSE FRACTURE OF RIGHT PATELLA WITH ROUTINE HEALING, SUBSEQUENT ENCOUNTER: Primary | ICD-10-CM

## 2020-09-03 DIAGNOSIS — S82.034A CLOSED NONDISPLACED TRANSVERSE FRACTURE OF RIGHT PATELLA, INITIAL ENCOUNTER: ICD-10-CM

## 2020-09-03 PROCEDURE — 99024 POSTOP FOLLOW-UP VISIT: CPT | Performed by: ORTHOPAEDIC SURGERY

## 2020-09-03 PROCEDURE — 73562 X-RAY EXAM OF KNEE 3: CPT

## 2020-09-03 NOTE — PROGRESS NOTES
Assessment  Diagnoses and all orders for this visit:    Closed nondisplaced transverse fracture of right patella with routine healing, subsequent encounter          Discussion and Plan:    · Patient is doing well on exam today  · D/C brace  · Start PT full ROM, WBAT, progress to strengthening as tolerated  · Follow up as needed    Subjective:   Patient ID: Victor M Pryor is a 71 y o  female      HPI  Patient presents today for follow up of a right knee patella fracture DOI 7/22/20  Patient has been wearing the T-rom brace as instructed  Patient states overall she is doing  She states she will occasionally have pain at night but it will go away when she changes positions  The following portions of the patient's history were reviewed and updated as appropriate: allergies, current medications, past family history, past medical history, past social history, past surgical history and problem list     Review of Systems   Constitutional: Negative for chills and fever  HENT: Negative for drooling and sneezing  Eyes: Negative for redness  Respiratory: Negative for cough and wheezing  Gastrointestinal: Negative for nausea and vomiting  Musculoskeletal:        Please see ortho exam   Psychiatric/Behavioral: Negative for behavioral problems  The patient is not nervous/anxious  Objective:  /85   Pulse 62   Ht 5' 4" (1 626 m)   Wt 73 5 kg (162 lb)   BMI 27 81 kg/m²       Right Knee Exam     Tenderness   Right knee tenderness location: mild over the fracture site  Range of Motion   The patient has normal right knee ROM  Tests   Varus: negative Valgus: negative    Other   Erythema: absent  Sensation: normal  Pulse: present  Swelling: none  Effusion: no effusion present    Comments:    Patient can perform a SLR without difficulty  Physical Exam  Vitals signs reviewed  Constitutional:       Appearance: She is well-developed     Eyes:      Pupils: Pupils are equal, round, and reactive to light  Pulmonary:      Effort: Pulmonary effort is normal       Breath sounds: Normal breath sounds  Musculoskeletal:      Right knee: She exhibits no effusion  Skin:     General: Skin is warm and dry  Neurological:      Mental Status: She is alert and oriented to person, place, and time  Psychiatric:         Behavior: Behavior normal          Thought Content: Thought content normal          Judgment: Judgment normal            I have personally reviewed pertinent films in PACS and my interpretation is as follows    X-rays right knee demonstrates a healed patella fracture    Scribe Attestation    I,:   Tan Wilder am acting as a scribe while in the presence of the attending physician :        I,:   Cm Chinchilla MD personally performed the services described in this documentation    as scribed in my presence :

## 2020-09-10 ENCOUNTER — EVALUATION (OUTPATIENT)
Dept: PHYSICAL THERAPY | Facility: MEDICAL CENTER | Age: 65
End: 2020-09-10
Payer: MEDICARE

## 2020-09-10 DIAGNOSIS — S82.034D CLOSED NONDISPLACED TRANSVERSE FRACTURE OF RIGHT PATELLA WITH ROUTINE HEALING, SUBSEQUENT ENCOUNTER: ICD-10-CM

## 2020-09-10 PROCEDURE — 97161 PT EVAL LOW COMPLEX 20 MIN: CPT | Performed by: PHYSICAL THERAPIST

## 2020-09-10 PROCEDURE — 97110 THERAPEUTIC EXERCISES: CPT | Performed by: PHYSICAL THERAPIST

## 2020-09-10 NOTE — PROGRESS NOTES
PT Evaluation     Today's date: 9/10/2020  Patient name: Claude Ray  : 1955  MRN: [id number]  Referring provider: Gardenia Canales MD  Dx:   Encounter Diagnosis     ICD-10-CM    1  Closed nondisplaced transverse fracture of right patella with routine healing, subsequent encounter  S82 034D Ambulatory referral to Physical Therapy       Start Time: 1735  Stop Time: 4153  Total time in clinic (min): 39 minutes    Assessment  Assessment details: Pt is a 76 y o female who presents s/p fall causing patellar fracture on 2020  Pt presents today with no complaints of pain, limited R knee ROM, decreased LE strength and decreased activity tolerance secondary to symptoms  These impairments limit the patient from participating in transfers at [facility], decreased ability to complete stairs at [facility], and decreased ability to perform house work at [facility]  I believe this patient is a good candidate for and will benefit from skilled physical therapy for R knee ROM exercises, LE strengthening exercises and mechanics training to improve functional ability and assist the patient to return to [facility]      Positive Prognostic Indicators: good attitude towards PT    Negative Prognostic Indicators: increased fear  Impairments: abnormal or restricted ROM, abnormal movement, activity intolerance, impaired physical strength, lacks appropriate home exercise program and pain with function    Symptom irritability: lowUnderstanding of Dx/Px/POC: good   Prognosis: good    Goals  STGs: 4 weeks  1) Pt will have improved R knee flexion AROM by 10*  2) pt will have improved R knee extension strength by 1/2 muscle grade  3) pt will have improved foto score of 10 points    LTGs: 8 weeks  1) pt will be independent with HEP by D/C  2) pt will be independent with symptom management by D/C  3) pt will be able to ascend and descend 10 steps with out deviations in order to traverse house at [address]  Patient would benefit from: skilled physical therapy  Planned modality interventions: cryotherapy and thermotherapy: hydrocollator packs  Planned therapy interventions: joint mobilization, manual therapy, neuromuscular re-education, patient education, strengthening, stretching, therapeutic exercise, therapeutic activities, home exercise program, functional ROM exercises, gait training and balance  Frequency: 2x week  Duration in weeks: 6  Plan of Care beginning date: 9/10/2020  Plan of Care expiration date: 10/22/2020  Treatment plan discussed with: patient        Subjective Evaluation    History of Present Illness  Mechanism of injury: DOO: July 22nd  LIZABETH: slipped and fell      Subjective Comments: pt just got rid of brace for broken knee cap  She is not going to PT  Going up and down stairs is difficulty  She notes going down is worse due to tightness  She notes some soreness in the front of the lower leg  Getting up and down from a chair is difficult because she does not want to bend the knee  Denies N&T    Pain   Rest: 0/10   Best:0/10   Worst: 0/10      Sleeping: independent    Home Set-up: 4 steps into house, no railing  15 steps L railing to second floor, 9 steps no railing    ADLs: independent     Work/Hobbies:    Also walking     Previous Treatment: brace     Goals:  Riding bicycle             Objective     Active Range of Motion   Left Knee   Flexion: 131 degrees   Extension: -1 degrees     Right Knee   Flexion: 113 degrees   Extension: 0 degrees     Passive Range of Motion   Left Knee   Flexion: 141 degrees   Extension: -1 degrees     Right Knee   Flexion: 120 degrees   Extension: 0 degrees     Mobility   Patellar Mobility:     Right Knee   WFL: medial, lateral, superior and inferior    Strength/Myotome Testing     Left Hip   Planes of Motion   Flexion: 4+  Abduction: 5  Adduction: 5    Right Hip   Planes of Motion   Flexion: 4+  Abduction: 5  Adduction: 5    Left Knee   Flexion: 4+  Extension: 5    Right Knee   Flexion: 4+  Extension: 4+    Left Ankle/Foot   Dorsiflexion: 5  Plantar flexion: 5    Right Ankle/Foot   Dorsiflexion: 5  Plantar flexion: 5    Ambulation     Ambulation: Level Surfaces   Ambulation without assistive device: independent    Ambulation: Stairs   Ascend stairs: independent  Pattern: reciprocal  Railings: one rail  Descend stairs: independent  Pattern: reciprocal  Railings: two rails    Observational Gait   Walking speed and stride length within functional limits  Functional Assessment      Squat    Left within functional limits  Flowsheet Rows      Most Recent Value   PT/OT G-Codes   Current Score  63   Projected Score  72             Precautions: universal      Manuals 9/10            PROM R knee RK            Patellar mobility RK                                      Neuro Re-Ed             SLR x10             bridges x10            LAQ 3"x10            clamshell                                                    Ther Ex             bike             Heel slides 10"x10            HR x10            Standing hip abd/ext x10 ea              Mini squats x10            Step ups             Lateral step ups                          Ther Activity             STS             stairs             Gait Training                                       Modalities

## 2020-09-14 ENCOUNTER — OFFICE VISIT (OUTPATIENT)
Dept: PHYSICAL THERAPY | Facility: MEDICAL CENTER | Age: 65
End: 2020-09-14
Payer: MEDICARE

## 2020-09-14 DIAGNOSIS — S82.034D CLOSED NONDISPLACED TRANSVERSE FRACTURE OF RIGHT PATELLA WITH ROUTINE HEALING, SUBSEQUENT ENCOUNTER: Primary | ICD-10-CM

## 2020-09-14 PROCEDURE — 97110 THERAPEUTIC EXERCISES: CPT | Performed by: PHYSICAL THERAPIST

## 2020-09-14 PROCEDURE — 97140 MANUAL THERAPY 1/> REGIONS: CPT | Performed by: PHYSICAL THERAPIST

## 2020-09-14 NOTE — PROGRESS NOTES
Daily Note     Today's date: 2020  Patient name: Vignesh Mcleod  : 1955  MRN: [id number]  Referring provider: Socorro Ruano MD  Dx:   Encounter Diagnosis     ICD-10-CM    1  Closed nondisplaced transverse fracture of right patella with routine healing, subsequent encounter  S82 034D        Start Time: 8544  Stop Time: 1649  Total time in clinic (min): 31 minutes    Subjective: pt states that she has some soreness from HEP  Objective: See treatment diary below      Assessment: Tolerated treatment well  Pt completed all exercises with no complaints of pain  Pt demonstrated full PROM during manual stretching this session  HEP progressed  Continue to progress as tolerated  Patient would benefit from continued PT      Plan: Continue per plan of care  Precautions: universal      Manuals 9/10 9/14           PROM R knee RK RK           Patellar mobility RK RK                                     Neuro Re-Ed             SLR x10  2x10           bridges x10 2x10           LAQ 3"x10 #2 3" 2x10           clamshell  2x10           SAQ  #2 2x10 3"                                     Ther Ex             bike  5'           Heel slides 10"x10 hep           HR x10 x20           Standing hip abd/ext x10 ea  #2 2x10 ea  b/l           Mini squats x10 2x10           Step ups  6" x10 ea  Lateral step ups  6"x10 ea             Side stepping  rtb 3 laps                                                               Ther Activity             STS             stairs             Gait Training                                       Modalities

## 2020-09-16 ENCOUNTER — APPOINTMENT (OUTPATIENT)
Dept: PHYSICAL THERAPY | Facility: MEDICAL CENTER | Age: 65
End: 2020-09-16
Payer: MEDICARE

## 2020-09-21 ENCOUNTER — OFFICE VISIT (OUTPATIENT)
Dept: PHYSICAL THERAPY | Facility: MEDICAL CENTER | Age: 65
End: 2020-09-21
Payer: MEDICARE

## 2020-09-21 DIAGNOSIS — S82.034D CLOSED NONDISPLACED TRANSVERSE FRACTURE OF RIGHT PATELLA WITH ROUTINE HEALING, SUBSEQUENT ENCOUNTER: Primary | ICD-10-CM

## 2020-09-21 PROCEDURE — 97140 MANUAL THERAPY 1/> REGIONS: CPT | Performed by: PHYSICAL THERAPIST

## 2020-09-21 PROCEDURE — 97110 THERAPEUTIC EXERCISES: CPT | Performed by: PHYSICAL THERAPIST

## 2020-09-21 NOTE — PROGRESS NOTES
Daily Note     Today's date: 2020  Patient name: Fang Corado  : 1955  MRN: [id number]  Referring provider: Paola Márquez MD  Dx:   Encounter Diagnosis     ICD-10-CM    1  Closed nondisplaced transverse fracture of right patella with routine healing, subsequent encounter  S82 139D                   Subjective: pt has minor complaints of discomfort when ascending and descending stairs in the front inside of the knee  Other than that her knee is doing well  Objective: See treatment diary below      Assessment: Tolerated treatment well  Pt demonstrates full PROM of R knee with no complaints of pain  Pt educated on knee valgus when completing eccentric loading during functional movements such as sitting and stepping down from a step  Lateral step down exercise was completed with minimal cueing for form  Continue to progress as tolerated  Patient would benefit from continued PT      Plan: Continue per plan of care  Precautions: universal      Manuals 9/10 9/14 9/21          PROM R knee RK RK RK          Patellar mobility RK RK RK                                    Neuro Re-Ed             SLR x10  2x10 np          bridges x10 2x10 2x10          LAQ 3"x10 #2 3" 2x10 #4 2x10 3"          clamshell  2x10 btb 2x10          SAQ  #2 2x10 3" #4 2x10 3"                                    Ther Ex             bike  5' 5'          Heel slides 10"x10 hep x20 active          HR x10 x20           Standing hip abd/ext x10 ea  #2 2x10 ea  b/l           Mini squats x10 2x10 STS to chair x10 ecc  Step ups  6" x10 ea   6"x20          Lateral step ups  6"x10 ea  6"x10          Side stepping  rtb 3 laps gtb 4 laps          Step downs   6"x20          Lateral step downs   6"x10                       education   2'          Ther Activity             STS             stairs             Gait Training                                       Modalities

## 2020-09-23 ENCOUNTER — OFFICE VISIT (OUTPATIENT)
Dept: PHYSICAL THERAPY | Facility: MEDICAL CENTER | Age: 65
End: 2020-09-23
Payer: MEDICARE

## 2020-09-23 DIAGNOSIS — S82.034D CLOSED NONDISPLACED TRANSVERSE FRACTURE OF RIGHT PATELLA WITH ROUTINE HEALING, SUBSEQUENT ENCOUNTER: Primary | ICD-10-CM

## 2020-09-23 PROCEDURE — 97110 THERAPEUTIC EXERCISES: CPT | Performed by: PHYSICAL THERAPIST

## 2020-09-23 PROCEDURE — 97112 NEUROMUSCULAR REEDUCATION: CPT | Performed by: PHYSICAL THERAPIST

## 2020-09-23 NOTE — PROGRESS NOTES
Daily Note     Today's date: 2020  Patient name: Mazin Leon  : 1955  MRN: [id number]  Referring provider: Lashell Parker MD  Dx:   Encounter Diagnosis     ICD-10-CM    1  Closed nondisplaced transverse fracture of right patella with routine healing, subsequent encounter  S82 034D        Start Time:   Stop Time: 244  Total time in clinic (min): 30 minutes    Subjective: pt reports to therapy stating that she has increased soreness  Objective: See treatment diary below      Assessment: Tolerated treatment well  Pt completed all exercises mild complaints of soreness and appropriate amounts of fatigue  Pt required moderate cueing for hip abductor activation with squatting and lateral step down test in order to prevent knee valgus  Continue to progress as tolerated  Patient would benefit from continued PT      Plan: Continue per plan of care  Precautions: universal      Manuals 9/10 9/14 9/21 9/23         PROM R knee RK RK RK          Patellar mobility RK RK RK                                    Neuro Re-Ed             SLR x10  2x10 np          bridges x10 2x10 2x10 2x10         LAQ 3"x10 #2 3" 2x10 #4 2x10 3" #5 2x10 3"         clamshell  2x10 btb 2x10 btb 2x10         SAQ  #2 2x10 3" #4 2x10 3" #5 2x10 3"          R knee valgus correction    gtb 2x10                      Ther Ex             bike  5' 5' 5'         Heel slides 10"x10 hep x20 active hep         HR x10 x20           Standing hip abd/ext x10 ea  #2 2x10 ea  b/l           Mini squats x10 2x10 STS to chair x10 ecc  STS to chair x10 ecc  Step ups  6" x10 ea   6"x20 8" x20         Lateral step ups  6"x10 ea  6"x10 8"x20         Side stepping  rtb 3 laps gtb 4 laps gtb 3 laps         Step downs   6"x20 6" x20         Lateral step downs   6"x10 6"x10                      education   2'          Ther Activity             STS             stairs             Gait Training                                       Modalities

## 2020-09-28 ENCOUNTER — OFFICE VISIT (OUTPATIENT)
Dept: PHYSICAL THERAPY | Facility: MEDICAL CENTER | Age: 65
End: 2020-09-28
Payer: MEDICARE

## 2020-09-28 DIAGNOSIS — S82.034D CLOSED NONDISPLACED TRANSVERSE FRACTURE OF RIGHT PATELLA WITH ROUTINE HEALING, SUBSEQUENT ENCOUNTER: Primary | ICD-10-CM

## 2020-09-28 PROCEDURE — 97110 THERAPEUTIC EXERCISES: CPT | Performed by: PHYSICAL THERAPIST

## 2020-09-28 PROCEDURE — 97112 NEUROMUSCULAR REEDUCATION: CPT | Performed by: PHYSICAL THERAPIST

## 2020-09-28 NOTE — PROGRESS NOTES
Daily Note     Today's date: 2020  Patient name: Hali De La Cruz  : 1955  MRN: [id number]  Referring provider: Ed Lei MD  Dx:   Encounter Diagnosis     ICD-10-CM    1  Closed nondisplaced transverse fracture of right patella with routine healing, subsequent encounter  S82 034D        Start Time:   Stop Time: 1728  Total time in clinic (min): 33 minutes    Subjective: pt states that she was sore after last visit, but with rest symptoms decreased  Denies pain in the knee upon arrival       Objective: See treatment diary below      Assessment: Tolerated treatment well  Pt tolerates all exercise with mild increase in symptoms that dissipates with rest   Progression of intensity of exercise was completed with no additional complaints  TKE resistance was applied during step up exercise and tolerated well  Continue to progress as tolerated  Patient would benefit from continued PT      Plan: Continue per plan of care  Precautions: universal      Manuals 9/10 9/14 9/21 9/23 9/28        PROM R knee RK RK RK          Patellar mobility RK RK RK                                    Neuro Re-Ed             SLR x10  2x10 np          bridges x10 2x10 2x10 2x10 2x10 btb        LAQ 3"x10 #2 3" 2x10 #4 2x10 3" #5 2x10 3" #5 2x10 3"        clamshell  2x10 btb 2x10 btb 2x10 btb 2x10        SAQ  #2 2x10 3" #4 2x10 3" #5 2x10 3"  #5 2x10 3"         R knee valgus correction    gtb 2x10 np                     Ther Ex             bike  5' 5' 5' 5'        Heel slides 10"x10 hep x20 active hep         HR x10 x20           Standing hip abd/ext x10 ea  #2 2x10 ea  b/l           Mini squats x10 2x10 STS to chair x10 ecc  STS to chair x10 ecc  STS to chair x10 ecc  Step ups  6" x10 ea  6"x20 8" x20 8" x20 + TKE blk band R knee        Lateral step ups  6"x10 ea  6"x10 8"x20 8" x20 ea          Side stepping  rtb 3 laps gtb 4 laps gtb 3 laps gtb 3 laps above knee w/ squat        Step downs   6"x20 6" x20 6"x20 Lateral step downs   6"x10 6"x10 6"x10        SL LP     nv                                               education   2'          Ther Activity             STS             stairs             Gait Training                                       Modalities

## 2020-09-30 ENCOUNTER — OFFICE VISIT (OUTPATIENT)
Dept: PHYSICAL THERAPY | Facility: MEDICAL CENTER | Age: 65
End: 2020-09-30
Payer: MEDICARE

## 2020-09-30 DIAGNOSIS — S82.034D CLOSED NONDISPLACED TRANSVERSE FRACTURE OF RIGHT PATELLA WITH ROUTINE HEALING, SUBSEQUENT ENCOUNTER: Primary | ICD-10-CM

## 2020-09-30 PROCEDURE — 97110 THERAPEUTIC EXERCISES: CPT | Performed by: PHYSICAL THERAPIST

## 2020-09-30 PROCEDURE — 97112 NEUROMUSCULAR REEDUCATION: CPT | Performed by: PHYSICAL THERAPIST

## 2020-09-30 NOTE — PROGRESS NOTES
Daily Note     Today's date: 2020  Patient name: Vignesh Mcleod  : 1955  MRN: [id number]  Referring provider: Socorro Ruano MD  Dx:   Encounter Diagnosis     ICD-10-CM    1  Closed nondisplaced transverse fracture of right patella with routine healing, subsequent encounter  S82 034D        Start Time: 11  Stop Time:   Total time in clinic (min): 24 minutes    Subjective: pt states that she was a little sore following LV due to new exercises, but symptoms are well controlled  Objective: See treatment diary below      Assessment: Tolerated treatment well  Pt continues to tolerate current treatment program well  DC planning was discussed  Pt and therapist agree to 1 more week of therapy prior to DC pending no adverse symptom production  Patient would benefit from continued PT      Plan: Continue per plan of care  Precautions: universal      Manuals 9/10 9/14 9/21 9/23 9/28 9/30       PROM R knee RK RK RK          Patellar mobility RK RK RK                                    Neuro Re-Ed             SLR x10  2x10 np          bridges x10 2x10 2x10 2x10 2x10 btb btb 2x10       LAQ 3"x10 #2 3" 2x10 #4 2x10 3" #5 2x10 3" #5 2x10 3" #5 2x10 3"       clamshell  2x10 btb 2x10 btb 2x10 btb 2x10 blk 2x10       SAQ  #2 2x10 3" #4 2x10 3" #5 2x10 3"  #5 2x10 3"  np       R knee valgus correction    gtb 2x10 np                     Ther Ex             bike  5' 5' 5' 5' 5'       Heel slides 10"x10 hep x20 active hep         HR x10 x20           Standing hip abd/ext x10 ea  #2 2x10 ea  b/l           Mini squats x10 2x10 STS to chair x10 ecc  STS to chair x10 ecc  STS to chair x10 ecc  STS to chair x10 ecc  Step ups  6" x10 ea  6"x20 8" x20 8" x20 + TKE blk band R knee 8" x20 + TKE blk band R knee       Lateral step ups  6"x10 ea  6"x10 8"x20 8" x20 ea  8" x20 ea         Side stepping  rtb 3 laps gtb 4 laps gtb 3 laps gtb 3 laps above knee w/ squat btb 3 laps above knee w/ squat       Step downs 6"x20 6" x20 6"x20 np       Lateral step downs   6"x10 6"x10 6"x10 np       SL LP     nv [id number] 2x10                                              education   2'          Ther Activity             STS             stairs             Gait Training                                       Modalities

## 2020-10-05 ENCOUNTER — OFFICE VISIT (OUTPATIENT)
Dept: PHYSICAL THERAPY | Facility: MEDICAL CENTER | Age: 65
End: 2020-10-05
Payer: MEDICARE

## 2020-10-05 DIAGNOSIS — S82.034D CLOSED NONDISPLACED TRANSVERSE FRACTURE OF RIGHT PATELLA WITH ROUTINE HEALING, SUBSEQUENT ENCOUNTER: Primary | ICD-10-CM

## 2020-10-05 PROCEDURE — 97110 THERAPEUTIC EXERCISES: CPT | Performed by: PHYSICAL THERAPIST

## 2020-10-07 ENCOUNTER — EVALUATION (OUTPATIENT)
Dept: PHYSICAL THERAPY | Facility: MEDICAL CENTER | Age: 65
End: 2020-10-07
Payer: MEDICARE

## 2020-10-07 DIAGNOSIS — S82.034D CLOSED NONDISPLACED TRANSVERSE FRACTURE OF RIGHT PATELLA WITH ROUTINE HEALING, SUBSEQUENT ENCOUNTER: Primary | ICD-10-CM

## 2020-10-07 PROCEDURE — 97140 MANUAL THERAPY 1/> REGIONS: CPT | Performed by: PHYSICAL THERAPIST

## 2020-10-07 PROCEDURE — 97110 THERAPEUTIC EXERCISES: CPT | Performed by: PHYSICAL THERAPIST

## 2020-11-23 ENCOUNTER — TELEPHONE (OUTPATIENT)
Dept: SURGICAL ONCOLOGY | Facility: CLINIC | Age: 65
End: 2020-11-23

## 2020-11-23 DIAGNOSIS — Z12.31 SCREENING MAMMOGRAM, ENCOUNTER FOR: Primary | ICD-10-CM

## 2021-04-25 ENCOUNTER — APPOINTMENT (EMERGENCY)
Dept: CT IMAGING | Facility: HOSPITAL | Age: 66
End: 2021-04-25
Payer: MEDICARE

## 2021-04-25 ENCOUNTER — APPOINTMENT (EMERGENCY)
Dept: RADIOLOGY | Facility: HOSPITAL | Age: 66
End: 2021-04-25
Payer: MEDICARE

## 2021-04-25 ENCOUNTER — HOSPITAL ENCOUNTER (OUTPATIENT)
Facility: HOSPITAL | Age: 66
Setting detail: OBSERVATION
Discharge: HOME | End: 2021-04-26
Attending: EMERGENCY MEDICINE | Admitting: HOSPITALIST
Payer: MEDICARE

## 2021-04-25 DIAGNOSIS — R20.2 NUMBNESS AND TINGLING OF LEFT UPPER AND LOWER EXTREMITY: ICD-10-CM

## 2021-04-25 DIAGNOSIS — G45.9 TIA (TRANSIENT ISCHEMIC ATTACK): Primary | ICD-10-CM

## 2021-04-25 DIAGNOSIS — R20.0 NUMBNESS AND TINGLING OF LEFT UPPER AND LOWER EXTREMITY: ICD-10-CM

## 2021-04-25 PROBLEM — I10 ESSENTIAL HYPERTENSION: Status: ACTIVE | Noted: 2021-04-25

## 2021-04-25 LAB
ANION GAP SERPL CALCULATED.3IONS-SCNC: 11 MMOL/L (ref 4–13)
APTT PPP: 24 SECONDS (ref 23–37)
BUN SERPL-MCNC: 16 MG/DL (ref 5–25)
CALCIUM SERPL-MCNC: 8.6 MG/DL (ref 8.3–10.1)
CHLORIDE SERPL-SCNC: 106 MMOL/L (ref 100–108)
CO2 SERPL-SCNC: 21 MMOL/L (ref 21–32)
CREAT SERPL-MCNC: 0.87 MG/DL (ref 0.6–1.3)
ERYTHROCYTE [DISTWIDTH] IN BLOOD BY AUTOMATED COUNT: 13 % (ref 11.6–15.1)
GFR SERPL CREATININE-BSD FRML MDRD: 70 ML/MIN/1.73SQ M
GLUCOSE SERPL-MCNC: 106 MG/DL (ref 65–140)
GLUCOSE SERPL-MCNC: 89 MG/DL (ref 65–140)
HCT VFR BLD AUTO: 41.9 % (ref 34.8–46.1)
HGB BLD-MCNC: 14.3 G/DL (ref 11.5–15.4)
INR PPP: 0.93 (ref 0.84–1.19)
MCH RBC QN AUTO: 31.5 PG (ref 26.8–34.3)
MCHC RBC AUTO-ENTMCNC: 34.1 G/DL (ref 31.4–37.4)
MCV RBC AUTO: 92 FL (ref 82–98)
PLATELET # BLD AUTO: 258 THOUSANDS/UL (ref 149–390)
PLATELET # BLD AUTO: 262 THOUSANDS/UL (ref 149–390)
PMV BLD AUTO: 10.8 FL (ref 8.9–12.7)
PMV BLD AUTO: 12.4 FL (ref 8.9–12.7)
POTASSIUM SERPL-SCNC: 4.5 MMOL/L (ref 3.5–5.3)
PROTHROMBIN TIME: 12.6 SECONDS (ref 11.6–14.5)
RBC # BLD AUTO: 4.54 MILLION/UL (ref 3.81–5.12)
SODIUM SERPL-SCNC: 138 MMOL/L (ref 136–145)
TROPONIN I SERPL-MCNC: <0.02 NG/ML
TROPONIN I SERPL-MCNC: <0.02 NG/ML
WBC # BLD AUTO: 6.72 THOUSAND/UL (ref 4.31–10.16)

## 2021-04-25 PROCEDURE — 70496 CT ANGIOGRAPHY HEAD: CPT

## 2021-04-25 PROCEDURE — 99220 PR INITIAL OBSERVATION CARE/DAY 70 MINUTES: CPT | Performed by: HOSPITALIST

## 2021-04-25 PROCEDURE — 70450 CT HEAD/BRAIN W/O DYE: CPT

## 2021-04-25 PROCEDURE — 93005 ELECTROCARDIOGRAM TRACING: CPT

## 2021-04-25 PROCEDURE — 84484 ASSAY OF TROPONIN QUANT: CPT | Performed by: EMERGENCY MEDICINE

## 2021-04-25 PROCEDURE — 99285 EMERGENCY DEPT VISIT HI MDM: CPT

## 2021-04-25 PROCEDURE — 84484 ASSAY OF TROPONIN QUANT: CPT | Performed by: FAMILY MEDICINE

## 2021-04-25 PROCEDURE — 85610 PROTHROMBIN TIME: CPT | Performed by: EMERGENCY MEDICINE

## 2021-04-25 PROCEDURE — 36415 COLL VENOUS BLD VENIPUNCTURE: CPT | Performed by: EMERGENCY MEDICINE

## 2021-04-25 PROCEDURE — 85027 COMPLETE CBC AUTOMATED: CPT | Performed by: EMERGENCY MEDICINE

## 2021-04-25 PROCEDURE — 71045 X-RAY EXAM CHEST 1 VIEW: CPT

## 2021-04-25 PROCEDURE — 70498 CT ANGIOGRAPHY NECK: CPT

## 2021-04-25 PROCEDURE — G1004 CDSM NDSC: HCPCS

## 2021-04-25 PROCEDURE — 80048 BASIC METABOLIC PNL TOTAL CA: CPT | Performed by: EMERGENCY MEDICINE

## 2021-04-25 PROCEDURE — 99285 EMERGENCY DEPT VISIT HI MDM: CPT | Performed by: EMERGENCY MEDICINE

## 2021-04-25 PROCEDURE — 1123F ACP DISCUSS/DSCN MKR DOCD: CPT | Performed by: EMERGENCY MEDICINE

## 2021-04-25 PROCEDURE — 82948 REAGENT STRIP/BLOOD GLUCOSE: CPT

## 2021-04-25 PROCEDURE — 85049 AUTOMATED PLATELET COUNT: CPT | Performed by: FAMILY MEDICINE

## 2021-04-25 PROCEDURE — 85730 THROMBOPLASTIN TIME PARTIAL: CPT | Performed by: EMERGENCY MEDICINE

## 2021-04-25 RX ORDER — ASCORBIC ACID 500 MG
250 TABLET ORAL DAILY
Status: DISCONTINUED | OUTPATIENT
Start: 2021-04-26 | End: 2021-04-26 | Stop reason: HOSPADM

## 2021-04-25 RX ORDER — ASPIRIN 81 MG/1
81 TABLET, CHEWABLE ORAL DAILY
Status: DISCONTINUED | OUTPATIENT
Start: 2021-04-26 | End: 2021-04-26 | Stop reason: HOSPADM

## 2021-04-25 RX ORDER — METOPROLOL SUCCINATE 50 MG/1
50 TABLET, EXTENDED RELEASE ORAL
Status: DISCONTINUED | OUTPATIENT
Start: 2021-04-25 | End: 2021-04-26 | Stop reason: HOSPADM

## 2021-04-25 RX ORDER — METOPROLOL SUCCINATE 50 MG/1
50 TABLET, EXTENDED RELEASE ORAL DAILY
Status: DISCONTINUED | OUTPATIENT
Start: 2021-04-26 | End: 2021-04-25

## 2021-04-25 RX ORDER — ATORVASTATIN CALCIUM 40 MG/1
40 TABLET, FILM COATED ORAL EVERY EVENING
Status: DISCONTINUED | OUTPATIENT
Start: 2021-04-25 | End: 2021-04-26 | Stop reason: HOSPADM

## 2021-04-25 RX ORDER — RIBOFLAVIN (VITAMIN B2) 100 MG
100 TABLET ORAL DAILY
Status: DISCONTINUED | OUTPATIENT
Start: 2021-04-26 | End: 2021-04-25

## 2021-04-25 RX ORDER — METOPROLOL SUCCINATE 50 MG/1
50 TABLET, EXTENDED RELEASE ORAL DAILY
COMMUNITY
End: 2021-08-05 | Stop reason: ALTCHOICE

## 2021-04-25 RX ADMIN — IOHEXOL 85 ML: 350 INJECTION, SOLUTION INTRAVENOUS at 16:09

## 2021-04-25 RX ADMIN — METOPROLOL SUCCINATE 50 MG: 50 TABLET, EXTENDED RELEASE ORAL at 22:45

## 2021-04-25 NOTE — ASSESSMENT & PLAN NOTE
POA: Pt with worsening s/s of numbness, burning, tingling that started in her L-arm and past couple days progressively increased to down her leg as well  Pt very concerned about recent change  No weakness, no paralysis, no slurred speech, no facial droop, no syncope, no visual changes, no palpitations  EKG: NSR, rate of 75 bpm, normal axis, normal intervals, no signs of ischemia noted  Previous Stress Echo in 2016 - No signs of ischemia after maximal exercise  CT Head: No acute intrascranial hemorrhage  CTA Neck and Brain: No acute Intracranial hemorrhage, No stenosis, no aneurysms  Labs: WNL, Trops negative x 1  Plan:  - Admit patient for observation under stroke pathway - pt very anxious and uncomfortable with outpatient workup   - Monitor on Telemetry  - Frequent Neuro checks  - Asprin 81 mg   - Atorvastatin 40 mg started  - Lipid Panel, Hbg A1c ordered  - Repeat CMP, CBC with morning labs  - MRI Head  - Echo ordered    - PT/OT/Speech  - Consult Neurology - recommendations appreciated

## 2021-04-25 NOTE — ASSESSMENT & PLAN NOTE
- Pt recently started on Metoprolol 50 mg at bedtime   - Will continue home meds  - Monitor VS closely

## 2021-04-25 NOTE — H&P
[facility]  H&P- Gianluca Garnica 1955, 76 y o  female MRN: [id number]  Unit/Bed#: FT 01 Encounter: [id number]  Primary Care Provider: Radha Don MD   Date and time admitted to hospital: 4/25/2021 12:33 PM    * Numbness and tingling of left upper and lower extremity  Assessment & Plan  POA: Pt with worsening s/s of numbness, burning, tingling that started in her L-arm and past couple days progressively increased to down her leg as well  Pt very concerned about recent change  No weakness, no paralysis, no slurred speech, no facial droop, no syncope, no visual changes, no palpitations  EKG: NSR, rate of 75 bpm, normal axis, normal intervals, no signs of ischemia noted  Previous Stress Echo in 2016 - No signs of ischemia after maximal exercise  CT Head: No acute intrascranial hemorrhage  CTA Neck and Brain: No acute Intracranial hemorrhage, No stenosis, no aneurysms  Labs: WNL, Trops negative x 1  Plan:  - Admit patient for observation under stroke pathway - pt very anxious and uncomfortable with outpatient workup   - Monitor on Telemetry  - Frequent Neuro checks  - Asprin 81 mg   - Atorvastatin 40 mg started  - Lipid Panel, Hbg A1c ordered  - Repeat CMP, CBC with morning labs  - MRI Head  - Echo ordered    - PT/OT/Speech  - Consult Neurology - recommendations appreciated  Essential hypertension  Assessment & Plan  - Pt recently started on Metoprolol 50 mg at bedtime   - Will continue home meds  - Monitor VS closely  VTE Prophylaxis: Enoxaparin (Lovenox)  / sequential compression device   Code Status: Level 1  POLST: POLST form is not discussed and not completed at this time  Anticipated Length of Stay:  Patient will be admitted on an Observation basis with an anticipated length of stay of  < 2 midnights  Justification for Hospital Stay: TIA    Chief Complaint:   Numbness and tingling Increased down whole left side        History of Present Illness:    Karis Alejandro is a 76 y o  female who presents with past medical hx of HTN, atypical chest pain worked up in 2016 with Exercise stress Echo - showing no signs of ischemia after maximal exercise  Pt presents to ED with worsening Left sided numbness/tingling/burning  She states this had started in her L-upper arm a few weeks ago and progressively has increased to Left arm and left leg  She denies weakness, she denies trauma, no facial droop, no loss of sensation, no visual changes, no palpitations, no chest pain, no sob  Pt admits only changes recently are some increased exercise on her stationary bike and she started Metoprolol 50 mg once daily about 3 days ago  She has never taken any thing for these sensations  She does admit to being an anxious person and can get herself worked up about her health  Pt denies any recent sick contacts, no f/c/n/v/d, no recent travel on insect bites that she has noted  Review of Systems:    Review of Systems   Constitutional: Negative for appetite change, chills, fatigue, fever and unexpected weight change  HENT: Negative for congestion, facial swelling, rhinorrhea, sinus pain, sore throat, tinnitus and trouble swallowing  Eyes: Positive for redness  Negative for photophobia and visual disturbance  Respiratory: Negative for cough, chest tightness, shortness of breath and wheezing  Cardiovascular: Negative for chest pain, palpitations and leg swelling  Gastrointestinal: Negative for abdominal pain, blood in stool, constipation, diarrhea, nausea and vomiting  Endocrine: Negative for polydipsia and polyuria  Genitourinary: Negative for difficulty urinating, dysuria and hematuria  Musculoskeletal: Positive for neck stiffness  Negative for back pain and neck pain  Skin: Negative for pallor and rash  Neurological: Positive for numbness  Negative for dizziness, tremors, syncope, speech difficulty, weakness and headaches     Psychiatric/Behavioral: Negative for confusion and suicidal ideas  The patient is nervous/anxious  Past Medical and Surgical History:     Past Medical History:   Diagnosis Date    Acute URI     Arthritis     Atypical chest pain     Fibrocystic breast disease     Osteoporosis     Palpitations     Psoriasis     Rib pain on left side     Viral conjunctivitis        Past Surgical History:   Procedure Laterality Date    FOOT SURGERY         Meds/Allergies:    Prior to Admission medications    Medication Sig Start Date End Date Taking? Authorizing Provider   Ascorbic Acid (VITAMIN C) 100 MG tablet Take 100 mg by mouth daily   Yes Historical Provider, MD   Cholecalciferol ([employer] VITAMIN D3) 1000 units capsule Take by mouth   Yes Historical Provider, MD   Garlic 10 MG CAPS Take by mouth   Yes Historical Provider, MD   metoprolol succinate (TOPROL-XL) 50 mg 24 hr tablet Take 50 mg by mouth daily   Yes Historical Provider, MD   Omega-3 Fatty Acids (FISH OIL) 1,000 mg Take 1,000 mg by mouth daily   Yes Historical Provider, MD     I have reviewed home medications with patient personally      Allergies: No Known Allergies    Social History:     Marital Status: /Civil Union   Occupation:   Patient Pre-hospital Living Situation: independent with   Patient Pre-hospital Level of Mobility: independent, ambulatory  Patient Pre-hospital Diet Restrictions: None  Substance Use History:   Social History     Substance and Sexual Activity   Alcohol Use Yes    Comment: social      Social History     Tobacco Use   Smoking Status Never Smoker   Smokeless Tobacco Never Used     Social History     Substance and Sexual Activity   Drug Use Not Currently       Family History:    Family History   Problem Relation Age of Onset    Skin cancer Father         age dx unk     Lung cancer Maternal Grandfather         age dx unk     No Known Problems Mother     No Known Problems Daughter     No Known Problems Maternal Grandmother     No Known Problems Paternal Grandmother     No Known Problems Paternal Grandfather     No Known Problems Maternal Aunt     No Known Problems Maternal Aunt     No Known Problems Maternal Aunt     No Known Problems Cousin     No Known Problems Cousin     No Known Problems Paternal Aunt     No Known Problems Cousin     No Known Problems Cousin        Physical Exam:     Vitals:   Blood Pressure: 145/75 (04/25/21 1730)  Pulse: 75 (04/25/21 1730)  Temperature: 97 9 °F (36 6 °C) (04/25/21 1229)  Temp Source: Oral (04/25/21 1229)  Respirations: 16 (04/25/21 1730)  Height: 5' 4" (162 6 cm) (04/25/21 1227)  Weight - Scale: 70 3 kg (155 lb) (04/25/21 1227)  SpO2: 98 % (04/25/21 1730)    Physical Exam  Vitals signs and nursing note reviewed  Constitutional:       General: She is not in acute distress  Appearance: Normal appearance  She is not ill-appearing  HENT:      Head: Normocephalic and atraumatic  Right Ear: Tympanic membrane and external ear normal       Left Ear: Tympanic membrane and external ear normal       Nose: Nose normal  No rhinorrhea  Mouth/Throat:      Mouth: Mucous membranes are moist       Pharynx: No oropharyngeal exudate or posterior oropharyngeal erythema  Eyes:      General: No scleral icterus  Extraocular Movements: Extraocular movements intact  Conjunctiva/sclera: Conjunctivae normal       Pupils: Pupils are equal, round, and reactive to light  Neck:      Musculoskeletal: Normal range of motion  No muscular tenderness  Comments: Hypertonic trapezius muscles bilaterally  Cardiovascular:      Rate and Rhythm: Normal rate and regular rhythm  Pulses: Normal pulses  Heart sounds: Normal heart sounds  No murmur  No gallop  Pulmonary:      Effort: Pulmonary effort is normal       Breath sounds: Normal breath sounds  No wheezing, rhonchi or rales  Abdominal:      General: Bowel sounds are normal  There is no distension  Palpations: Abdomen is soft  Tenderness: There is no abdominal tenderness  There is no guarding  Musculoskeletal: Normal range of motion  General: No tenderness  Right lower leg: No edema  Left lower leg: No edema  Comments: Upper Ext - 5/5 strength bilat  Lower Ext - 5/5 strength bilat   Lymphadenopathy:      Cervical: No cervical adenopathy  Skin:     General: Skin is warm  Capillary Refill: Capillary refill takes less than 2 seconds  Findings: No bruising, erythema or rash  Neurological:      General: No focal deficit present  Mental Status: She is alert and oriented to person, place, and time  Cranial Nerves: No cranial nerve deficit  Sensory: No sensory deficit  Motor: No weakness  Coordination: Coordination normal       Comments: Sensation intact to light touch and pen-prick in upper, lower ext bilaterally  No facial asymmetry noted  Psychiatric:         Mood and Affect: Mood normal          Behavior: Behavior normal          Additional Data:     Lab Results: I have personally reviewed pertinent reports  Results from last 7 days   Lab Units 04/25/21  1305   WBC Thousand/uL 6 72   HEMOGLOBIN g/dL 14 3   HEMATOCRIT % 41 9   PLATELETS Thousands/uL 258     Results from last 7 days   Lab Units 04/25/21  1305   POTASSIUM mmol/L 4 5   CHLORIDE mmol/L 106   CO2 mmol/L 21   BUN mg/dL 16   CREATININE mg/dL 0 87   CALCIUM mg/dL 8 6     Results from last 7 days   Lab Units 04/25/21  1305   INR  0 93       Imaging: I have personally reviewed pertinent reports  Cta Head And Neck With And Without Contrast    Result Date: 4/25/2021  Narrative: CTA NECK AND BRAIN WITH AND WITHOUT CONTRAST INDICATION: TIA, initial exam unilateral paresthesia COMPARISON:   None  TECHNIQUE:  Routine CT imaging of the Brain without contrast   Post contrast imaging was performed after administration of iodinated contrast through the neck and brain   Post contrast axial 0 625 mm images timed to opacify the arterial system  3D rendering was performed on an independent workstation  MIP reconstructions performed  Coronal reconstructions were performed of the noncontrast portion of the brain  Radiation dose length product (DLP) for this visit:  740 mGy-cm   This examination, like all CT scans performed in the [facility], was performed utilizing techniques to minimize radiation dose exposure, including the use of iterative reconstruction and automated exposure control  IV Contrast:  85 mL of iohexol (OMNIPAQUE)  IMAGE QUALITY:   Diagnostic FINDINGS: NONCONTRAST BRAIN PARENCHYMA:  No intracranial mass, mass effect or midline shift  No CT signs of acute infarction  No acute parenchymal hemorrhage  VENTRICLES AND EXTRA-AXIAL SPACES:  Normal for the patient's age  VISUALIZED ORBITS AND PARANASAL SINUSES:  Unremarkable  CERVICAL VASCULATURE AORTIC ARCH AND GREAT VESSELS:  Normal aortic arch and great vessel origins  Normal visualized subclavian vessels  RIGHT VERTEBRAL ARTERY CERVICAL SEGMENT:  Normal origin  The vessel is normal in caliber throughout the neck  LEFT VERTEBRAL ARTERY CERVICAL SEGMENT:  Normal origin  The vessel is normal in caliber throughout the neck  RIGHT EXTRACRANIAL CAROTID SEGMENT:  Normal caliber common carotid artery  Normal bifurcation and cervical internal carotid artery  No stenosis or dissection  LEFT EXTRACRANIAL CAROTID SEGMENT:  Normal caliber common carotid artery  Normal bifurcation and cervical internal carotid artery  No stenosis or dissection  NASCET criteria was used to determine the degree of internal carotid artery diameter stenosis  INTRACRANIAL VASCULATURE INTERNAL CAROTID ARTERIES:  Normal enhancement of the intracranial portions of the internal carotid arteries  Normal ophthalmic artery origins  Normal ICA terminus  ANTERIOR CIRCULATION:  Symmetric A1 segments and anterior cerebral arteries with normal enhancement  Normal anterior communicating artery  MIDDLE CEREBRAL ARTERY CIRCULATION:  M1 segment and middle cerebral artery branches demonstrate normal enhancement bilaterally  DISTAL VERTEBRAL ARTERIES:  Normal distal vertebral arteries  Posterior inferior cerebellar artery origins are normal  Normal vertebral basilar junction  BASILAR ARTERY:  Basilar artery is normal in caliber  Normal superior cerebellar arteries  POSTERIOR CEREBRAL ARTERIES: Both posterior cerebral arteries arises from the basilar tip  Both arteries demonstrate normal enhancement  Normal posterior communicating arteries  DURAL VENOUS SINUSES:  Normal  NON VASCULAR ANATOMY BONY STRUCTURES:  No acute osseous abnormality  SOFT TISSUES OF THE NECK:  Normal  THORACIC INLET:  Unremarkable  Impression: 1  No acute intracranial hemorrhage, mass effect or extra-axial collection  2   No hemodynamically significant stenosis, dissection or occlusion of the carotid or vertebral arteries  3   No intracranial aneurysm  No hemodynamically significant stenosis or occlusion of the major vessels of the [place]  Workstation performed: [id number]     Ct Head Without Contrast    Result Date: 4/25/2021  Narrative: CT BRAIN - WITHOUT CONTRAST INDICATION:   Intermittent left-sided  COMPARISON:  None  TECHNIQUE:  CT examination of the brain was performed  In addition to axial images, sagittal and coronal 2D reformatted images were created and submitted for interpretation  Radiation dose length product (DLP) for this visit:  892 mGy-cm   This examination, like all CT scans performed in the [facility], was performed utilizing techniques to minimize radiation dose exposure, including the use of iterative reconstruction and automated exposure control  IMAGE QUALITY:  Diagnostic  FINDINGS: PARENCHYMA:  No intracranial mass, mass effect or midline shift  No CT signs of acute infarction  No acute parenchymal hemorrhage   VENTRICLES AND EXTRA-AXIAL SPACES:  Normal for the patient's age  VISUALIZED ORBITS AND PARANASAL SINUSES:  Unremarkable  CALVARIUM AND EXTRACRANIAL SOFT TISSUES:  Normal      Impression: No acute intracranial hemorrhage No extra-axial collection No CT signs of acute infarction Workstation performed: [id number]       EKG, Pathology, and Other Studies Reviewed on Admission:   · EKG: NSR at rate of 75, normal axis, normal intervals, no signs of ischemia  Epic / Care Everywhere Records Reviewed: Yes     ** Please Note: This note has been constructed using a voice recognition system   **

## 2021-04-26 ENCOUNTER — APPOINTMENT (OUTPATIENT)
Dept: NEUROLOGY | Facility: HOSPITAL | Age: 66
End: 2021-04-26
Payer: MEDICARE

## 2021-04-26 ENCOUNTER — APPOINTMENT (OUTPATIENT)
Dept: MRI IMAGING | Facility: HOSPITAL | Age: 66
End: 2021-04-26
Payer: MEDICARE

## 2021-04-26 ENCOUNTER — APPOINTMENT (OUTPATIENT)
Dept: NON INVASIVE DIAGNOSTICS | Facility: HOSPITAL | Age: 66
End: 2021-04-26
Payer: MEDICARE

## 2021-04-26 VITALS
WEIGHT: 155 LBS | OXYGEN SATURATION: 96 % | HEART RATE: 68 BPM | HEIGHT: 64 IN | TEMPERATURE: 98.4 F | SYSTOLIC BLOOD PRESSURE: 143 MMHG | DIASTOLIC BLOOD PRESSURE: 72 MMHG | RESPIRATION RATE: 16 BRPM | BODY MASS INDEX: 26.46 KG/M2

## 2021-04-26 PROBLEM — G45.9 TIA (TRANSIENT ISCHEMIC ATTACK): Status: ACTIVE | Noted: 2021-04-26

## 2021-04-26 LAB
ALBUMIN SERPL BCP-MCNC: 3.5 G/DL (ref 3.5–5)
ALP SERPL-CCNC: 66 U/L (ref 46–116)
ALT SERPL W P-5'-P-CCNC: 33 U/L (ref 12–78)
ANION GAP SERPL CALCULATED.3IONS-SCNC: 9 MMOL/L (ref 4–13)
AST SERPL W P-5'-P-CCNC: 17 U/L (ref 5–45)
BASOPHILS # BLD AUTO: 0.05 THOUSANDS/ΜL (ref 0–0.1)
BASOPHILS NFR BLD AUTO: 1 % (ref 0–1)
BILIRUB SERPL-MCNC: 0.39 MG/DL (ref 0.2–1)
BUN SERPL-MCNC: 14 MG/DL (ref 5–25)
CALCIUM SERPL-MCNC: 8.3 MG/DL (ref 8.3–10.1)
CHLORIDE SERPL-SCNC: 108 MMOL/L (ref 100–108)
CHOLEST SERPL-MCNC: 211 MG/DL (ref 50–200)
CO2 SERPL-SCNC: 25 MMOL/L (ref 21–32)
CREAT SERPL-MCNC: 0.93 MG/DL (ref 0.6–1.3)
EOSINOPHIL # BLD AUTO: 0.06 THOUSAND/ΜL (ref 0–0.61)
EOSINOPHIL NFR BLD AUTO: 1 % (ref 0–6)
ERYTHROCYTE [DISTWIDTH] IN BLOOD BY AUTOMATED COUNT: 13.2 % (ref 11.6–15.1)
EST. AVERAGE GLUCOSE BLD GHB EST-MCNC: 108 MG/DL
GFR SERPL CREATININE-BSD FRML MDRD: 64 ML/MIN/1.73SQ M
GLUCOSE P FAST SERPL-MCNC: 93 MG/DL (ref 65–99)
GLUCOSE SERPL-MCNC: 93 MG/DL (ref 65–140)
HBA1C MFR BLD: 5.4 %
HCT VFR BLD AUTO: 37.9 % (ref 34.8–46.1)
HDLC SERPL-MCNC: 48 MG/DL
HGB BLD-MCNC: 12.7 G/DL (ref 11.5–15.4)
IMM GRANULOCYTES # BLD AUTO: 0.02 THOUSAND/UL (ref 0–0.2)
IMM GRANULOCYTES NFR BLD AUTO: 0 % (ref 0–2)
LDLC SERPL CALC-MCNC: 152 MG/DL (ref 0–100)
LYMPHOCYTES # BLD AUTO: 2.06 THOUSANDS/ΜL (ref 0.6–4.47)
LYMPHOCYTES NFR BLD AUTO: 30 % (ref 14–44)
MCH RBC QN AUTO: 31.1 PG (ref 26.8–34.3)
MCHC RBC AUTO-ENTMCNC: 33.5 G/DL (ref 31.4–37.4)
MCV RBC AUTO: 93 FL (ref 82–98)
MONOCYTES # BLD AUTO: 0.71 THOUSAND/ΜL (ref 0.17–1.22)
MONOCYTES NFR BLD AUTO: 11 % (ref 4–12)
NEUTROPHILS # BLD AUTO: 3.88 THOUSANDS/ΜL (ref 1.85–7.62)
NEUTS SEG NFR BLD AUTO: 57 % (ref 43–75)
NRBC BLD AUTO-RTO: 0 /100 WBCS
PLATELET # BLD AUTO: 222 THOUSANDS/UL (ref 149–390)
PMV BLD AUTO: 10.6 FL (ref 8.9–12.7)
POTASSIUM SERPL-SCNC: 4.1 MMOL/L (ref 3.5–5.3)
PROT SERPL-MCNC: 6.5 G/DL (ref 6.4–8.2)
RBC # BLD AUTO: 4.08 MILLION/UL (ref 3.81–5.12)
SODIUM SERPL-SCNC: 142 MMOL/L (ref 136–145)
TRIGL SERPL-MCNC: 55 MG/DL
WBC # BLD AUTO: 6.78 THOUSAND/UL (ref 4.31–10.16)

## 2021-04-26 PROCEDURE — 95819 EEG AWAKE AND ASLEEP: CPT | Performed by: STUDENT IN AN ORGANIZED HEALTH CARE EDUCATION/TRAINING PROGRAM

## 2021-04-26 PROCEDURE — 80053 COMPREHEN METABOLIC PANEL: CPT | Performed by: FAMILY MEDICINE

## 2021-04-26 PROCEDURE — 85025 COMPLETE CBC W/AUTO DIFF WBC: CPT | Performed by: FAMILY MEDICINE

## 2021-04-26 PROCEDURE — 83036 HEMOGLOBIN GLYCOSYLATED A1C: CPT | Performed by: FAMILY MEDICINE

## 2021-04-26 PROCEDURE — 70553 MRI BRAIN STEM W/O & W/DYE: CPT

## 2021-04-26 PROCEDURE — G1004 CDSM NDSC: HCPCS

## 2021-04-26 PROCEDURE — 80061 LIPID PANEL: CPT | Performed by: FAMILY MEDICINE

## 2021-04-26 PROCEDURE — 95816 EEG AWAKE AND DROWSY: CPT

## 2021-04-26 PROCEDURE — A9585 GADOBUTROL INJECTION: HCPCS | Performed by: FAMILY MEDICINE

## 2021-04-26 PROCEDURE — 99204 OFFICE O/P NEW MOD 45 MIN: CPT | Performed by: PSYCHIATRY & NEUROLOGY

## 2021-04-26 PROCEDURE — 99217 PR OBSERVATION CARE DISCHARGE MANAGEMENT: CPT | Performed by: FAMILY MEDICINE

## 2021-04-26 PROCEDURE — 93306 TTE W/DOPPLER COMPLETE: CPT

## 2021-04-26 RX ADMIN — GADOBUTROL 7 ML: 604.72 INJECTION INTRAVENOUS at 10:07

## 2021-04-26 RX ADMIN — ASPIRIN 81 MG: 81 TABLET, CHEWABLE ORAL at 08:57

## 2021-04-26 RX ADMIN — OXYCODONE HYDROCHLORIDE AND ACETAMINOPHEN 250 MG: 500 TABLET ORAL at 08:57

## 2021-04-26 RX ADMIN — ENOXAPARIN SODIUM 40 MG: 40 INJECTION SUBCUTANEOUS at 08:57

## 2021-04-26 NOTE — PROGRESS NOTES
While discharging patient, she mentioned she was suggested to get a MRI of the c-spine and asked if she needed an order to do so  Nurse reached out to Marilee Mcmanus in neurology  Per Catherine Gallegos, the resident was supposed to put an order in  Resident Jessica Govea was reached out to via tiger text and made aware of the situation  Prior to leaving, the patient stated she will make a MyChart when she gets home  Patient left her cell phone number and email with nurse in the event she needs a prescription for the MRI  Patient's cell number is [phone] and her email is [email or web address]  Patient stated in the event she needs a printed prescription, she will be able to print it out at work  Nurse awaiting a confirmation from Resident Jessica Govea regarding MRI order

## 2021-04-26 NOTE — PLAN OF CARE
Problem: NEUROSENSORY - ADULT  Goal: Achieves stable or improved neurological status  Description: INTERVENTIONS  - Monitor and report changes in neurological status  - Monitor vital signs such as temperature, blood pressure, glucose, and any other labs ordered   - Initiate measures to prevent increased intracranial pressure  - Monitor for seizure activity and implement precautions if appropriate      Outcome: Progressing  Goal: Remains free of injury related to seizures activity  Description: INTERVENTIONS  - Maintain airway, patient safety  and administer oxygen as ordered  - Monitor patient for seizure activity, document and report duration and description of seizure to physician/advanced practitioner  - If seizure occurs,  ensure patient safety during seizure  - Reorient patient post seizure  - Seizure pads on all 4 side rails  - Instruct patient/family to notify RN of any seizure activity including if an aura is experienced  - Instruct patient/family to call for assistance with activity based on nursing assessment  - Administer anti-seizure medications if ordered    Outcome: Progressing  Goal: Achieves maximal functionality and self care  Description: INTERVENTIONS  - Monitor swallowing and airway patency with patient fatigue and changes in neurological status  - Encourage and assist patient to increase activity and self care     - Encourage visually impaired, hearing impaired and aphasic patients to use assistive/communication devices  Outcome: Progressing     Problem: METABOLIC, FLUID AND ELECTROLYTES - ADULT  Goal: Electrolytes maintained within normal limits  Description: INTERVENTIONS:  - Monitor labs and assess patient for signs and symptoms of electrolyte imbalances  - Administer electrolyte replacement as ordered  - Monitor response to electrolyte replacements, including repeat lab results as appropriate  - Instruct patient on fluid and nutrition as appropriate  Outcome: Progressing  Goal: Fluid balance maintained  Description: INTERVENTIONS:  - Monitor labs   - Monitor I/O and WT  - Instruct patient on fluid and nutrition as appropriate  - Assess for signs & symptoms of volume excess or deficit  Outcome: Progressing     Problem: PAIN - ADULT  Goal: Verbalizes/displays adequate comfort level or baseline comfort level  Description: Interventions:  - Encourage patient to monitor pain and request assistance  - Assess pain using appropriate pain scale  - Administer analgesics based on type and severity of pain and evaluate response  - Implement non-pharmacological measures as appropriate and evaluate response  - Consider cultural and social influences on pain and pain management  - Notify physician/advanced practitioner if interventions unsuccessful or patient reports new pain  Outcome: Progressing     Problem: INFECTION - ADULT  Goal: Absence or prevention of progression during hospitalization  Description: INTERVENTIONS:  - Assess and monitor for signs and symptoms of infection  - Monitor lab/diagnostic results  - Monitor all insertion sites, i e  indwelling lines, tubes, and drains  - Monitor endotracheal if appropriate and nasal secretions for changes in amount and color  - [place] appropriate cooling/warming therapies per order  - Administer medications as ordered  - Instruct and encourage patient and family to use good hand hygiene technique  - Identify and instruct in appropriate isolation precautions for identified infection/condition  Outcome: Progressing  Goal: Absence of fever/infection during neutropenic period  Description: INTERVENTIONS:  - Monitor WBC    Outcome: Progressing     Problem: SAFETY ADULT  Goal: Patient will remain free of falls  Description: INTERVENTIONS:  - Assess patient frequently for physical needs  -  Identify cognitive and physical deficits and behaviors that affect risk of falls    -  [place] fall precautions as indicated by assessment   - Educate patient/family on patient safety including physical limitations  - Instruct patient to call for assistance with activity based on assessment  - Modify environment to reduce risk of injury  - Consider OT/PT consult to assist with strengthening/mobility  Outcome: Progressing  Goal: Maintain or return to baseline ADL function  Description: INTERVENTIONS:  -  Assess patient's ability to carry out ADLs; assess patient's baseline for ADL function and identify physical deficits which impact ability to perform ADLs (bathing, care of mouth/teeth, toileting, grooming, dressing, etc )  - Assess/evaluate cause of self-care deficits   - Assess range of motion  - Assess patient's mobility; develop plan if impaired  - Assess patient's need for assistive devices and provide as appropriate  - Encourage maximum independence but intervene and supervise when necessary  - Involve family in performance of ADLs  - Assess for home care needs following discharge   - Consider OT consult to assist with ADL evaluation and planning for discharge  - Provide patient education as appropriate  Outcome: Progressing  Goal: Maintain or return mobility status to optimal level  Description: INTERVENTIONS:  - Assess patient's baseline mobility status (ambulation, transfers, stairs, etc )    - Identify cognitive and physical deficits and behaviors that affect mobility  - Identify mobility aids required to assist with transfers and/or ambulation (gait belt, sit-to-stand, lift, walker, cane, etc )  - [place] fall precautions as indicated by assessment  - Record patient progress and toleration of activity level on Mobility SBAR; progress patient to next Phase/Stage  - Instruct patient to call for assistance with activity based on assessment  - Consider rehabilitation consult to assist with strengthening/weightbearing, etc   Outcome: Progressing     Problem: DISCHARGE PLANNING  Goal: Discharge to home or other facility with appropriate resources  Description: INTERVENTIONS:  - Identify barriers to discharge w/patient and caregiver  - Arrange for needed discharge resources and transportation as appropriate  - Identify discharge learning needs (meds, wound care, etc )  - Arrange for interpretive services to assist at discharge as needed  - Refer to Case Management Department for coordinating discharge planning if the patient needs post-hospital services based on physician/advanced practitioner order or complex needs related to functional status, cognitive ability, or social support system  Outcome: Progressing     Problem: Knowledge Deficit  Goal: Patient/family/caregiver demonstrates understanding of disease process, treatment plan, medications, and discharge instructions  Description: Complete learning assessment and assess knowledge base    Interventions:  - Provide teaching at level of understanding  - Provide teaching via preferred learning methods  Outcome: Progressing

## 2021-04-26 NOTE — ASSESSMENT & PLAN NOTE
POA: Pt with worsening s/s of numbness, burning, tingling that started in her L-arm and past couple days progressively increased to down her leg as well  Pt very concerned about recent change  No weakness, no paralysis, no slurred speech, no facial droop, no syncope, no visual changes, no palpitations  EKG: NSR, rate of 75 bpm, normal axis, normal intervals, no signs of ischemia noted  Previous Stress Echo in 2016 - No signs of ischemia after maximal exercise  CT Head: No acute intrascranial hemorrhage  CTA Neck and Brain: No acute Intracranial hemorrhage, No stenosis, no aneurysms  Labs: WNL, Trops negative x 1  Plan:  - Admit patient for observation under stroke pathway  - Monitor on Telemetry  - Frequent Neuro checks  - Asprin 81 mg   - Atorvastatin 40 mg started  - Lipid Panel, Hbg A1c ordered  - Repeat CMP, CBC with morning labs  - MRI Head - No intracranial pathology  - Echo - completed  - EEG - Completed pending read  - PT/OT/Speech - No recommendation or needs observed  - Neurology - Follow up as outpatient

## 2021-04-26 NOTE — DISCHARGE INSTRUCTIONS
Transient Ischemic Attack   WHAT YOU NEED TO KNOW:   A transient ischemic attack (TIA), or mini-stroke, happens when blood cannot flow to part of the brain  A TIA only lasts minutes to hours and does not cause lasting damage  It is still important to get immediate medical care  A TIA may be a warning that you are about to have an ischemic stroke  An ischemic stroke happens when blood flow to the brain is suddenly blocked, usually by a blood clot  DISCHARGE INSTRUCTIONS:   Call your local emergency number (911 in the [address] [facility]) or have someone else call if:   · You have any of the following signs of a stroke:      ? Numbness or drooping on one side of your face     ? Weakness in an arm or leg    ? Confusion or difficulty speaking    ? Dizziness, a severe headache, or vision loss       · You have a seizure  · You have chest pain or shortness of breath  · You cough up blood  Return to the emergency department if:   · Your arm or leg feels warm, tender, and painful  It may look swollen and red  · You have unusual or heavy bleeding  · You have a severe headache or feel dizzy  Call your doctor or neurologist if:   · Your blood pressure or blood sugar level is higher or lower than you were told it should be  · You have questions or concerns about your condition or care  Warning signs of a stroke: The words BE FAST can help you remember and recognize warning signs of a stroke:  · B = Balance:  Sudden loss of balance    · E = Eyes:  Loss of vision in one or both eyes    · F = Face:  Face droops on one side    · A = Arms:  Arm drops when both arms are raised    · S = Speech:  Speech is slurred or sounds different    · T = Time:  Time to get help immediately     Medicines: You may need any of the following:  · Antiplatelets , such as aspirin, help prevent blood clots  Take your antiplatelet medicine exactly as directed  These medicines make it more likely for you to bleed or bruise   If you are told to take aspirin, do not take acetaminophen or ibuprofen instead  · Blood thinners  help prevent blood clots  Clots can cause strokes, heart attacks, and death  The following are general safety guidelines to follow while you are taking a blood thinner:    ? Watch for bleeding and bruising while you take blood thinners  Watch for bleeding from your gums or nose  Watch for blood in your urine and bowel movements  Use a soft washcloth on your skin, and a soft toothbrush to brush your teeth  This can keep your skin and gums from bleeding  If you shave, use an electric shaver  Do not play contact sports  ? Tell your dentist and other healthcare providers that you take a blood thinner  Wear a bracelet or necklace that says you take this medicine  ? Do not start or stop any other medicines unless your healthcare provider tells you to  Many medicines cannot be used with blood thinners  ? Take your blood thinner exactly as prescribed by your healthcare provider  Do not skip does or take less than prescribed  Tell your provider right away if you forget to take your blood thinner, or if you take too much  ? Warfarin  is a blood thinner that you may need to take  The following are things you should be aware of if you take warfarin:     § Foods and medicines can affect the amount of warfarin in your blood  Do not make major changes to your diet while you take warfarin  Warfarin works best when you eat about the same amount of vitamin K every day  Vitamin K is found in green leafy vegetables and certain other foods  Ask for more information about what to eat when you are taking warfarin  § You will need to see your healthcare provider for follow-up visits when you are on warfarin  You will need regular blood tests  These tests are used to decide how much medicine you need  · Other medicines  may be needed to treat diabetes, depression, high cholesterol, or blood pressure problems   You may also need medicine to decrease the pressure in your brain, reduce pain, or prevent seizures  · Take your medicine as directed  Contact your healthcare provider if you think your medicine is not helping or if you have side effects  Tell him of her if you are allergic to any medicine  Keep a list of the medicines, vitamins, and herbs you take  Include the amounts, and when and why you take them  Bring the list or the pill bottles to follow-up visits  Carry your medicine list with you in case of an emergency  What you can do to prevent another TIA or a stroke:   · Manage health conditions  A condition such as diabetes can increase your risk for a stroke  Control your blood sugar level if you have hyperglycemia or diabetes  Take your prescribed medicines and check your blood sugar level as directed  · Check your blood pressure as directed  High blood pressure can increase your risk for a stroke  If you have high blood pressure, follow your healthcare provider's directions for controlling your blood pressure  · Do not use nicotine products or illegal drugs  Nicotine and other chemicals in cigarettes and cigars can cause blood vessel damage  Nicotine and illegal drugs both increase your risk for a stroke  Ask your healthcare provider for information if you currently smoke or use drugs and need help to quit  E- cigarettes or smokeless tobacco still contain nicotine  Talk to your healthcare provider before you use these products  · Talk to your healthcare provider about alcohol  Alcohol can raise your blood pressure  The recommended limit is 2 drinks in a day for men and 1 drink in a day for women  Do not binge drink or save a week's worth of alcohol to drink in 1 or 2 days  Limit weekly amounts as directed by your provider  · Eat a variety of healthy foods  Healthy foods include whole-grain breads, low-fat dairy products, beans, lean meats, and fish  Eat at least 5 servings of fruits and vegetables each day  Choose foods that are low in fat, cholesterol, salt, and sugar  Eat foods that are high in potassium, such as potatoes and bananas  A dietitian can help you create healthy meal plans  · Maintain a healthy weight  Ask your healthcare provider how much you should weigh  Ask him or her to help you create a weight loss plan if you are overweight  He or she can help you create small goals if you have a lot of weight to lose  · Exercise as directed  Exercise can lower your blood pressure, cholesterol, weight, and blood sugar levels  Healthcare providers will help you create exercise goals  They can also help you make a plan to reach your goals  For example, you can break exercise into 10 minute periods, 3 times in the day  Find an exercise that you enjoy  This will make it easier for you to reach your exercise goals  · Manage stress  Stress can raise your blood pressure  Find ways to relax, such as deep breathing or listening to music  Follow up with your doctor or neurologist in 1 to 2 days:  Write down your questions so you remember to ask them during your visits  © Copyright [address] Information is for End User's use only and may not be sold, redistributed or otherwise used for commercial purposes  All illustrations and images included in CareNotes® are the copyrighted property of [employer]  or [address] [place] [address]  The above information is an  only  It is not intended as medical advice for individual conditions or treatments  Talk to your doctor, nurse or pharmacist before following any medical regimen to see if it is safe and effective for you  Chronic Hypertension, Ambulatory Care   GENERAL INFORMATION:   Chronic hypertension  is a long-term condition in which your blood pressure (BP) is higher than normal  Your BP is the force of your blood moving against the walls of your arteries  Hypertension is a BP of 140/90 or higher     Common symptoms include the following:   · Headache     · Blurred vision    · Chest pain     · Dizziness or weakness     · Trouble breathing     · Nosebleeds  Seek immediate care for the following symptoms:   · Severe headache or vision loss    · Weakness in an arm or leg    · Confusion or difficulty speaking    · Discomfort in your chest that feels like squeezing, pressure, fullness, or pain    · Suddenly feeling lightheaded or trouble breathing    · Pain or discomfort in your back, neck, jaw, stomach, or arm  Treatment for chronic hypertension  may include medicine to lower your BP  You may also need to make lifestyle changes  Take your medicine exactly as directed  Manage chronic hypertension:   · Take your BP at home  Sit and rest for 5 minutes before you take your BP  Extend your arm and support it on a flat surface  Your arm should be at the same level as your heart  Follow the directions that came with your BP monitor  If possible, take at least 2 BP readings each time  Take your BP at least twice a day at the same times each day, such as morning and evening  Keep a log of your BP readings and bring it to your follow-up visits  · Eat less sodium (salt)  Do not add sodium to your food  Limit foods that are high in sodium, such as canned foods, potato chips, and cold cuts  Your healthcare provider may suggest that you follow the [address] [place] [address] Street  The plan is low in sodium, unhealthy fats, and total fat  It is high in potassium, calcium, and fiber  · Exercise regularly  Exercise at least 30 minutes per day, on most days of the week  This will help decrease your BP  Ask your healthcare provider about the best exercise plan for you  · Limit alcohol  Women should limit alcohol to 1 drink a day  Men should limit alcohol to 2 drinks a day  A drink of alcohol is 12 ounces of beer, 5 ounces of wine, or 1½ ounces of liquor  · Do not smoke  If you smoke, it is never too late to quit  Smoking can increase your BP   Smoking also worsens other health conditions you may have that can increase your risk for hypertension  Ask your healthcare provider for information if you need help quitting  Follow up with your healthcare provider as directed: You will need to return to have your BP checked and to have other lab tests done  Write down your questions so you remember to ask them during your visits  CARE AGREEMENT:   You have the right to help plan your care  Learn about your health condition and how it may be treated  Discuss treatment options with your caregivers to decide what care you want to receive  You always have the right to refuse treatment  The above information is an  only  It is not intended as medical advice for individual conditions or treatments  Talk to your doctor, nurse or pharmacist before following any medical regimen to see if it is safe and effective for you  © 2014 [address] is for End User's use only and may not be sold, redistributed or otherwise used for commercial purposes  All illustrations and images included in CareNotes® are the copyrighted property of [employer]  or Basilio Mario

## 2021-04-26 NOTE — SPEECH THERAPY NOTE
Speech Language/Pathology  Speech/Language Pathology  Assessment    Patient Name: Keri Trevizo  HHTNO'G Date: 4/26/2021     Problem List  Principal Problem:    Numbness and tingling of left upper and lower extremity  Active Problems:    Essential hypertension    Past Medical History  Past Medical History:   Diagnosis Date    Acute URI     Arthritis     Atypical chest pain     Fibrocystic breast disease     Osteoporosis     Palpitations     Psoriasis     Rib pain on left side     Viral conjunctivitis      Past Surgical History  Past Surgical History:   Procedure Laterality Date    FOOT SURGERY         21-year-old female recent diagnosis of hypertension presented with left arm and left leg numbness  She denies any weakness associated with the symptoms  States it feels like she has been given Novocain  No prior history of similar  The arm symptoms have been present for a few weeks however she became more concerned when symptoms progressed into her leg  Had been increasing her activity after her recent diagnosis of hypertension  No weakness, no paralysis, no slurred speech, no facial droop, no syncope, no visual changes, no palpitations  Consult received for speech/swallow eval on stroke pathway  Pt passed nsg swallow screen; tolerating regular diet w/o s/s dysphagia or aspiration  No speech/language deficits reported  NIH score is 0  MRI: 4/26/21 No intracranial pathology  No need for formal speech/swallow eval at this time  Reconsult if needed      Hamida Nolan CCC-SLP  Speech Pathologist  Available via  tiger text

## 2021-04-26 NOTE — ASSESSMENT & PLAN NOTE
- Pt recently started on Metoprolol 50 mg at bedtime   - Continue taking Metoprolol as prescribed on d/c   - Continue Lifestyle modifications as discussed

## 2021-04-26 NOTE — ASSESSMENT & PLAN NOTE
POA: Pt with worsening s/s of numbness, burning, tingling that started in her L-arm and past couple days progressively increased to down her leg as well  Pt very concerned about recent change  No weakness, no paralysis, no slurred speech, no facial droop, no syncope, no visual changes, no palpitations  EKG: NSR, rate of 75 bpm, normal axis, normal intervals, no signs of ischemia noted  Previous Stress Echo in 2016 - No signs of ischemia after maximal exercise  CT Head: No acute intrascranial hemorrhage  CTA Neck and Brain: No acute Intracranial hemorrhage, No stenosis, no aneurysms  Labs: WNL, Trops negative x 1  Plan:  - Admit patient for observation under stroke pathway  - Monitor on Telemetry  - Frequent Neuro checks  - Asprin 81 mg   - Atorvastatin 40 mg started  - Lipid Panel, Hbg A1c ordered  - Repeat CMP, CBC with morning labs  - MRI Head - No intracranial pathology  - Echo pending  - PT/OT/Speech - No recommendation or needs observed  - Neurology - Follow up as outpatient for continued work up with a routine EEG and possible MRI of C-spine
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
## 2021-04-26 NOTE — DISCHARGE SUMMARY
[facility]  Discharge- Fang Corado 1955, 76 y o  female MRN: [id number]  Unit/Bed#: S -[place] Encounter: [id number]  Primary Care Provider: Ezio Delgadillo MD   Date and time admitted to hospital: 4/25/2021 12:33 PM    * Numbness and tingling of left upper and lower extremity  Assessment & Plan  POA: Pt with worsening s/s of numbness, burning, tingling that started in her L-arm and past couple days progressively increased to down her leg as well  Pt very concerned about recent change  No weakness, no paralysis, no slurred speech, no facial droop, no syncope, no visual changes, no palpitations  EKG: NSR, rate of 75 bpm, normal axis, normal intervals, no signs of ischemia noted  Previous Stress Echo in 2016 - No signs of ischemia after maximal exercise  CT Head: No acute intrascranial hemorrhage  CTA Neck and Brain: No acute Intracranial hemorrhage, No stenosis, no aneurysms  Labs: WNL, Trops negative x 1  Plan:  - Admit patient for observation under stroke pathway  - Monitor on Telemetry  - Frequent Neuro checks  - Asprin 81 mg   - Atorvastatin 40 mg started  - Lipid Panel, Hbg A1c ordered  - Repeat CMP, CBC with morning labs  - MRI Head - No intracranial pathology  - Echo - completed  - EEG - Completed pending read  - PT/OT/Speech - No recommendation or needs observed  - Neurology - Follow up as outpatient  Essential hypertension  Assessment & Plan  - Pt recently started on Metoprolol 50 mg at bedtime   - Continue taking Metoprolol as prescribed on d/c   - Continue Lifestyle modifications as discussed  Nutrition Assessment and Intervention:     Online resources such as NutritionFacts  Marlys Roll  com, plantstrong com, pcrm  YouFig, or similar provided to patient      Emotional and Mental Well-being, Sleep, Connectedness Assessment and Intervention:    Stress management, meditation, yoga, or sleep online resources/apps provided to patient Other interventions: OAK Sean, Headspace, Guided mindfulness on YouTube  Discharging Resident Physician: Vannesa Guerra DO  Attending: Pinky Cool DO  PCP: Cruz Ballard MD  Admission Date: 4/25/2021  Discharge Date: 04/26/21    Disposition:     Home    Reason for Admission: TIA v cervical stenosis v  Seizure     Consultations During Hospital Stay:  · Neurology    Procedures Performed:     · CT head - No acute intracranial hemorrhage   · CTA Head - No acute intracranial hemorrhage, mass effect or extra-axial collection  2   No hemodynamically significant stenosis, dissection or occlusion of the carotid or vertebral arteries  · MRI - No intracranial pathology  · Echo - pending    Significant Findings / Test Results:     · None    Incidental Findings:   · None    Test Results Pending at Discharge (will require follow up): · Echo     Outpatient Tests Requested:  · Outpatient follow up with Neuro - Recommend EEG and MRI C-Spine    Complications:  None    Hospital Course: Alfreda Jones is a 76 y o  female patient who originally presented to the hospital on 4/25/2021 due to worsening Left sided numbness/tingling/burning  She states this had started in her L-upper arm a few weeks ago and progressively has increased to Left arm and left leg  past medical hx of HTN, atypical chest pain worked up in 2016 with Exercise stress Echo  In the ED patient noted to have elevated B/Ps suggestive of stroke v TIA v Cervical Stenosis v  Seizure like activity  Pt had CT scan and CTA done with results as above  Pt continued to to be Tachycardic and admitted for observation under the stroke pathway  Neurology was consulted and recommended MRI in the morning which was completed with no acute findings  Also recommended completing baseline EEG  Pt had EEG done prior to discharge   Pt VS remained stable in the morning, she was tolerating PO intake and was recommended to follow up for further evaluation with imaging of low back, C-spine by PCP or Neurology as able  Pt understood and agreed with the plan on day of discharge  Condition at Discharge: good     Discharge Day Visit / Exam:     Subjective:  Pt seen and examined sitting comfortably in her chair with no acute events overnight  Pt had no more numbness, burning sensation down her L-side any longer  Pt denies any chest pain, sob, f/c/n/v/d  Pt denies any h/a, no urinary s/s, no visual changes, no slurred speech  Vitals: Blood Pressure: 132/75 (04/26/21 1430)  Pulse: 68 (04/26/21 1430)  Temperature: 98 4 °F (36 9 °C) (04/26/21 0630)  Temp Source: Oral (04/26/21 0630)  Respirations: 16 (04/26/21 1430)  Height: 5' 4" (162 6 cm) (04/25/21 2330)  Weight - Scale: 70 3 kg (155 lb) (04/25/21 2330)  SpO2: 96 % (04/26/21 0630)  Exam:   Physical Exam  Vitals signs and nursing note reviewed  Constitutional:       General: She is not in acute distress  Appearance: Normal appearance  She is not ill-appearing  HENT:      Head: Normocephalic and atraumatic  Right Ear: Tympanic membrane and external ear normal       Left Ear: Tympanic membrane and external ear normal       Nose: Nose normal  No rhinorrhea  Mouth/Throat:      Mouth: Mucous membranes are moist       Pharynx: No oropharyngeal exudate or posterior oropharyngeal erythema  Eyes:      Extraocular Movements: Extraocular movements intact  Conjunctiva/sclera: Conjunctivae normal       Pupils: Pupils are equal, round, and reactive to light  Cardiovascular:      Rate and Rhythm: Normal rate and regular rhythm  Pulses: Normal pulses  Heart sounds: Normal heart sounds  No murmur  Pulmonary:      Effort: Pulmonary effort is normal       Breath sounds: Normal breath sounds  No wheezing, rhonchi or rales  Abdominal:      General: Bowel sounds are normal  There is no distension  Palpations: Abdomen is soft  Tenderness: There is no abdominal tenderness  There is no guarding  Musculoskeletal: Normal range of motion  Right lower leg: No edema  Left lower leg: No edema  Lymphadenopathy:      Cervical: No cervical adenopathy  Skin:     General: Skin is warm  Capillary Refill: Capillary refill takes less than 2 seconds  Findings: No bruising, erythema or rash  Neurological:      General: No focal deficit present  Mental Status: She is alert and oriented to person, place, and time  Cranial Nerves: No cranial nerve deficit  Motor: No weakness  Comments: Upper/Lower ext sensation to light touch intact  Patellar, achilles DTR intact  Brachial DTR intact bilaterally  Neg Rombergs  Coordination intact  Psychiatric:         Mood and Affect: Mood normal          Behavior: Behavior normal          Discussion with Family: Discussed with patient who would call her   Discharge instructions/Information to patient and family:   See after visit summary for information provided to patient and family  Provisions for Follow-Up Care:  See after visit summary for information related to follow-up care and any pertinent home health orders  Planned Readmission: None     Discharge Medications:  See after visit summary for reconciled discharge medications provided to patient and family        ** Please Note: This note has been constructed using a voice recognition system **

## 2021-04-26 NOTE — PHYSICAL THERAPY NOTE
PHYSICAL THERAPY SCREEN NOTE    Patient Name: Keri Trevizo  [id number] Date: 4/26/2021 04/26/21 0910   PT Last Visit   PT Visit Date 04/26/21   Note Type   Note type Screen   Activity Tolerance   Medical Staff Made Aware Spoke to Leslie Covington   Nurse Made Aware Spoke to RN [facility]   Assessment   Assessment PT orders received, chart review performed  Spoke to Crow De Jesus who reports pt is appropriate to participate in PT evaluation  Contact made w/ pt who reports she's been independently ambulating around room without difficulty  Pt states her N+T resolved, denies issues wuith balance or gait instability even when symptoms were present  Therapist witnessed pt ambulate from recliner chair to [facility] to go to MRI w/ I and no difficulties  Pt denies concerns about navigating home environment upon DC  Encouraged pt to continue to mobilize during admission  Pt w/ no acute care PT needs at this time, will DC from IPPT caseload  Please reconsult if any changes or concerns arise - thank you!     Concha Yan, PT, DPT

## 2021-04-26 NOTE — ASSESSMENT & PLAN NOTE
Jonathan Melo is a 76 y o  female with HTN, osteoporosis, and anxiety who presents to [facility] ED on 04/25/2021 with intermittent left-sided numbness  MRI brain unremarkable for evidence of acute infarct  Symptoms occurring intermittently for several days, does not fit TIA picture  Patient does have cervical neck pain and significant tenderness on exam  Concern for cervical radiculopathy resulting in left upper extremity numbness, with a anxiety component resulting in left lower extremity numbness  Patient does admit to significant stressors in her life, likely worsening anxiety  Given significant family history of seizures, will obtain routine EEG  Plan:  - Recommend obtaining MRI C-spine in the outpatient setting  - Echo official read pending  - Routine EEG pending, able to obtain in the outpatient setting if not completed this admission  - Okay to discontinue ASA 81 mg daily  - Okay to continue continue atorvastatin 40 mg daily, however will defer to primary team  - Patient did not want any medications to help with cervical neck pain  - Goal normotension, euglycemia, normothermia  - Telemetry  - Frequent neuro checks  - PT/OT/speech  - Medical management and supportive care per primary team, notify with changes    Imaging/Labs:  - CT head 04/25/2021:  · No acute intracranial hemorrhage, extra-axial collection, or signs of acute infarction  - CTA head and neck 04/25/2021:  · No acute intracranial hemorrhage, mass effect, or extra-axial collection  · No hemodynamically significant stenosis, dissection, or occlusion of the carotid or vertebral arteries  · No intracranial aneurysm  No hemodynamically significant stenosis or occlusion of the major vessels of the [place]  - MRI brain w wo:  · Unremarkable for acute intracranial pathology  - Lipid panel:   Total cholesterol elevated 211, triglycerides 55, HDL 48, LDL elevated 152  - Hemoglobin A1c:  5 4  - Labs WNL:  Troponin

## 2021-04-26 NOTE — DISCHARGE INSTR - AVS FIRST PAGE
Dear Mari Jones,     It was our pleasure to care for you here at [facility], [employer]  It is our hope that we were always able to exceed the expected standards for your care during your stay  You were hospitalized due to ***  You were cared for on the *** floor by Russell Verdugo DO under the service of Josep Javier DO with the [facility] who covers for your primary care physician (PCP), Milvia Hough MD, while you were hospitalized  If you have any questions or concerns related to this hospitalization, you may contact us at [phone]  For follow up as well as any medication refills, we recommend that you follow up with your primary care physician  A registered nurse will reach out to you by phone within a few days after your discharge to answer any additional questions that you may have after going home  However, at this time we provide for you here, the most important instructions / recommendations at discharge:     · Notable Medication Adjustments -   · None  · Testing Required after Discharge -   · Neurology recommends follow up for Routine EEG and discussion about MRI C-spine  · Important follow up information -   · Please make a follow up appointment with your Primary care physician to review medications and recent hospital visit or further imaging that may be needed if your symptoms return  · Please make an appointment to follow up with Neurology outpatient as directed for any further testing  · Other Instructions -   · Return to ED precautions discussed  · Please review this entire after visit summary as additional general instructions including medication list, appointments, activity, diet, any pertinent wound care, and other additional recommendations from your care team that may be provided for you        Sincerely,     Russell Verdugo DO

## 2021-04-26 NOTE — OCCUPATIONAL THERAPY NOTE
Occupational Therapy Screen     Patient Name: Mari Jones  LQPFE'G Date: 4/26/2021  Problem List  Principal Problem:    Numbness and tingling of left upper and lower extremity  Active Problems:    Essential hypertension    Past Medical History  Past Medical History:   Diagnosis Date    Acute URI     Arthritis     Atypical chest pain     Fibrocystic breast disease     Osteoporosis     Palpitations     Psoriasis     Rib pain on left side     Viral conjunctivitis      Past Surgical History  Past Surgical History:   Procedure Laterality Date    FOOT SURGERY          04/26/21 1108   OT Last Visit   OT Visit Date 04/26/21  (Monday)   Note Type   Note type Screen   Activity Tolerance   Medical Staff Made Aware spoke to PTEvelin and Deven MONROY   Assessment   Assessment OT orders received and chart review completed  Pt reports symptoms resolved and completing ADL at / near baseline level of I  No acute OT needs at this time  Will screen from OT caseload   [facility], OT

## 2021-04-26 NOTE — PLAN OF CARE
Problem: NEUROSENSORY - ADULT  Goal: Achieves stable or improved neurological status  Description: INTERVENTIONS  - Monitor and report changes in neurological status  - Monitor vital signs such as temperature, blood pressure, glucose, and any other labs ordered   - Initiate measures to prevent increased intracranial pressure  - Monitor for seizure activity and implement precautions if appropriate      Outcome: Completed  Goal: Remains free of injury related to seizures activity  Description: INTERVENTIONS  - Maintain airway, patient safety  and administer oxygen as ordered  - Monitor patient for seizure activity, document and report duration and description of seizure to physician/advanced practitioner  - If seizure occurs,  ensure patient safety during seizure  - Reorient patient post seizure  - Seizure pads on all 4 side rails  - Instruct patient/family to notify RN of any seizure activity including if an aura is experienced  - Instruct patient/family to call for assistance with activity based on nursing assessment  - Administer anti-seizure medications if ordered    Outcome: Completed  Goal: Achieves maximal functionality and self care  Description: INTERVENTIONS  - Monitor swallowing and airway patency with patient fatigue and changes in neurological status  - Encourage and assist patient to increase activity and self care     - Encourage visually impaired, hearing impaired and aphasic patients to use assistive/communication devices  Outcome: Completed     Problem: METABOLIC, FLUID AND ELECTROLYTES - ADULT  Goal: Electrolytes maintained within normal limits  Description: INTERVENTIONS:  - Monitor labs and assess patient for signs and symptoms of electrolyte imbalances  - Administer electrolyte replacement as ordered  - Monitor response to electrolyte replacements, including repeat lab results as appropriate  - Instruct patient on fluid and nutrition as appropriate  Outcome: Completed  Goal: Fluid balance maintained  Description: INTERVENTIONS:  - Monitor labs   - Monitor I/O and WT  - Instruct patient on fluid and nutrition as appropriate  - Assess for signs & symptoms of volume excess or deficit  Outcome: Completed     Problem: PAIN - ADULT  Goal: Verbalizes/displays adequate comfort level or baseline comfort level  Description: Interventions:  - Encourage patient to monitor pain and request assistance  - Assess pain using appropriate pain scale  - Administer analgesics based on type and severity of pain and evaluate response  - Implement non-pharmacological measures as appropriate and evaluate response  - Consider cultural and social influences on pain and pain management  - Notify physician/advanced practitioner if interventions unsuccessful or patient reports new pain  Outcome: Completed     Problem: INFECTION - ADULT  Goal: Absence or prevention of progression during hospitalization  Description: INTERVENTIONS:  - Assess and monitor for signs and symptoms of infection  - Monitor lab/diagnostic results  - Monitor all insertion sites, i e  indwelling lines, tubes, and drains  - Monitor endotracheal if appropriate and nasal secretions for changes in amount and color  - [place] appropriate cooling/warming therapies per order  - Administer medications as ordered  - Instruct and encourage patient and family to use good hand hygiene technique  - Identify and instruct in appropriate isolation precautions for identified infection/condition  Outcome: Completed  Goal: Absence of fever/infection during neutropenic period  Description: INTERVENTIONS:  - Monitor WBC    Outcome: Completed     Problem: SAFETY ADULT  Goal: Patient will remain free of falls  Description: INTERVENTIONS:  - Assess patient frequently for physical needs  -  Identify cognitive and physical deficits and behaviors that affect risk of falls    -  [place] fall precautions as indicated by assessment   - Educate patient/family on patient safety including physical limitations  - Instruct patient to call for assistance with activity based on assessment  - Modify environment to reduce risk of injury  - Consider OT/PT consult to assist with strengthening/mobility  Outcome: Completed  Goal: Maintain or return to baseline ADL function  Description: INTERVENTIONS:  -  Assess patient's ability to carry out ADLs; assess patient's baseline for ADL function and identify physical deficits which impact ability to perform ADLs (bathing, care of mouth/teeth, toileting, grooming, dressing, etc )  - Assess/evaluate cause of self-care deficits   - Assess range of motion  - Assess patient's mobility; develop plan if impaired  - Assess patient's need for assistive devices and provide as appropriate  - Encourage maximum independence but intervene and supervise when necessary  - Involve family in performance of ADLs  - Assess for home care needs following discharge   - Consider OT consult to assist with ADL evaluation and planning for discharge  - Provide patient education as appropriate  Outcome: Completed  Goal: Maintain or return mobility status to optimal level  Description: INTERVENTIONS:  - Assess patient's baseline mobility status (ambulation, transfers, stairs, etc )    - Identify cognitive and physical deficits and behaviors that affect mobility  - Identify mobility aids required to assist with transfers and/or ambulation (gait belt, sit-to-stand, lift, walker, cane, etc )  - [place] fall precautions as indicated by assessment  - Record patient progress and toleration of activity level on Mobility SBAR; progress patient to next Phase/Stage  - Instruct patient to call for assistance with activity based on assessment  - Consider rehabilitation consult to assist with strengthening/weightbearing, etc   Outcome: Completed     Problem: DISCHARGE PLANNING  Goal: Discharge to home or other facility with appropriate resources  Description: INTERVENTIONS:  - Identify barriers to discharge w/patient and caregiver  - Arrange for needed discharge resources and transportation as appropriate  - Identify discharge learning needs (meds, wound care, etc )  - Arrange for interpretive services to assist at discharge as needed  - Refer to Case Management Department for coordinating discharge planning if the patient needs post-hospital services based on physician/advanced practitioner order or complex needs related to functional status, cognitive ability, or social support system  Outcome: Completed     Problem: Knowledge Deficit  Goal: Patient/family/caregiver demonstrates understanding of disease process, treatment plan, medications, and discharge instructions  Description: Complete learning assessment and assess knowledge base    Interventions:  - Provide teaching at level of understanding  - Provide teaching via preferred learning methods  Outcome: Completed

## 2021-04-27 PROCEDURE — 93306 TTE W/DOPPLER COMPLETE: CPT | Performed by: INTERNAL MEDICINE

## 2021-04-28 LAB
ATRIAL RATE: 79 BPM
P AXIS: 55 DEGREES
PR INTERVAL: 136 MS
QRS AXIS: 38 DEGREES
QRSD INTERVAL: 78 MS
QT INTERVAL: 364 MS
QTC INTERVAL: 407 MS
T WAVE AXIS: 51 DEGREES
VENTRICULAR RATE: 75 BPM

## 2021-04-28 PROCEDURE — 93010 ELECTROCARDIOGRAM REPORT: CPT | Performed by: INTERNAL MEDICINE

## 2021-05-02 NOTE — ED PROVIDER NOTES
History  Chief Complaint   Patient presents with    Numbness     Pt complains of left sided numbness in the arm, leg and foot  Pt states that she is not constantly numb and that it comes in waves  Pt states that she has no Hx of stroke  77 yo female with h/o HTN p/w intermittent left sided paresthesias x 1 week  Reports daily episodes with increasing intensity and length over course of the week, last episode just PTA prompted ER visit, is resolving  Has seen PCP who thought maybe related to "pinched nerve"  Episodes variably occur with activity (sweeping, mixing/stiring) but also occur at rest   Denies associated weakness, however during last episode did state movement of LE going up stairs was "off", denies heaviness per say  Denies associated changes in vision/speech/clumsiness of hand/HA/chest pain or pressure or assocaited shortness of breath  Denies h/o CVA/TIA or stroke like symptoms in past   Has brother who may have  from "heart attack" in setting of seizure in 52's but is not sure  NO other first degress family members with h/o CVA/CV disease  Denies new medications, recent travel, new exposures  Denies n/v/d/f/c/URI symptoms or cough  History provided by:  Medical records, patient and significant other      Prior to Admission Medications   Prescriptions Last Dose Informant Patient Reported? Taking?    Ascorbic Acid (VITAMIN C) 100 MG tablet 2021 at Unknown time Self Yes Yes   Sig: Take 100 mg by mouth daily   Cholecalciferol (CVS VITAMIN D3) 1000 units capsule 2021 at Unknown time Self Yes Yes   Sig: Take by mouth   Garlic 10 MG CAPS  at Unknown time Self Yes Yes   Sig: Take by mouth   Omega-3 Fatty Acids (FISH OIL) 1,000 mg 2021 at Unknown time Self Yes Yes   Sig: Take 1,000 mg by mouth daily   metoprolol succinate (TOPROL-XL) 50 mg 24 hr tablet 2021 at Unknown time  Yes Yes   Sig: Take 50 mg by mouth daily      Facility-Administered Medications: None Past Medical History:   Diagnosis Date    Acute URI     Arthritis     Atypical chest pain     Fibrocystic breast disease     Osteoporosis     Palpitations     Psoriasis     Rib pain on left side     Viral conjunctivitis        Past Surgical History:   Procedure Laterality Date    FOOT SURGERY         Family History   Problem Relation Age of Onset    Skin cancer Father         age dx [de-identified]    [facility] Maternal Grandfather         age dx unk     No Known Problems Mother     No Known Problems Daughter     No Known Problems Maternal Grandmother     No Known Problems Paternal Grandmother     No Known Problems Paternal Grandfather     No Known Problems Maternal Aunt     No Known Problems Maternal Aunt     No Known Problems Maternal Aunt     No Known Problems Cousin     No Known Problems Cousin     No Known Problems Paternal Aunt     No Known Problems Cousin     No Known Problems Cousin      I have reviewed and agree with the history as documented  E-Cigarette/Vaping    E-Cigarette Use Never User      E-Cigarette/Vaping Substances     Social History     Tobacco Use    Smoking status: Never Smoker    Smokeless tobacco: Never Used   Substance Use Topics    Alcohol use: Yes     Comment: social     Drug use: Not Currently       Review of Systems   Constitutional: Negative for chills and fever  HENT: Negative for rhinorrhea and sore throat  Respiratory: Negative for cough and shortness of breath  Cardiovascular: Negative for chest pain and leg swelling  Gastrointestinal: Negative for diarrhea, nausea and vomiting  Genitourinary: Negative for dysuria, frequency and urgency  Musculoskeletal: Negative for back pain, gait problem, myalgias and neck pain  Skin: Negative for rash  Neurological: Positive for numbness  Negative for dizziness, seizures, syncope, facial asymmetry, speech difficulty, weakness and headaches     All other systems reviewed and are negative  Physical Exam  Physical Exam  Vitals signs and nursing note reviewed  Constitutional:       Appearance: She is well-developed  She is not diaphoretic  HENT:      Head: Normocephalic and atraumatic  Eyes:      Conjunctiva/sclera: Conjunctivae normal    Neck:      Musculoskeletal: Normal range of motion  Cardiovascular:      Rate and Rhythm: Normal rate and regular rhythm  Heart sounds: Normal heart sounds  No murmur  Pulmonary:      Effort: Pulmonary effort is normal  No respiratory distress  Breath sounds: Normal breath sounds  Abdominal:      Palpations: Abdomen is soft  Musculoskeletal: Normal range of motion  General: No deformity  Skin:     General: Skin is warm and dry  Neurological:      Mental Status: She is alert and oriented to person, place, and time  She is not disoriented  GCS: GCS eye subscore is 4  GCS verbal subscore is 5  GCS motor subscore is 6  Cranial Nerves: No cranial nerve deficit  Sensory: No sensory deficit  Coordination: Coordination normal       Gait: Gait normal       Deep Tendon Reflexes: Babinski sign absent on the right side  Babinski sign absent on the left side  Comments: No nystagmus   Psychiatric:         Behavior: Behavior normal          Thought Content:  Thought content normal          Judgment: Judgment normal          Vital Signs  ED Triage Vitals   Temperature Pulse Respirations Blood Pressure SpO2   04/25/21 1229 04/25/21 1227 04/25/21 1227 04/25/21 1227 04/25/21 1227   97 9 °F (36 6 °C) 97 16 (!) 182/84 98 %      Temp Source Heart Rate Source Patient Position - Orthostatic VS BP Location FiO2 (%)   04/25/21 1229 04/25/21 1227 04/25/21 1227 04/25/21 1227 --   Oral Monitor Lying Right arm       Pain Score       04/25/21 2311       No Pain           Vitals:    04/26/21 0630 04/26/21 1030 04/26/21 1430 04/26/21 1556   BP: 125/84 119/63 132/75 143/72   Pulse: 70 80 68    Patient Position - Orthostatic VS: Lying Sitting Lying Lying         Visual Acuity  Visual Acuity      Most Recent Value   L Pupil Size (mm)  3   R Pupil Size (mm)  3   L Pupil Shape  Round   R Pupil Shape  Round          ED Medications  Medications   iohexol (OMNIPAQUE) 350 MG/ML injection (SINGLE-DOSE) 85 mL (85 mL Intravenous Given 4/25/21 1609)   Gadobutrol injection (SINGLE-DOSE) SOLN 7 mL (7 mL Intravenous Given 4/26/21 1007)       Diagnostic Studies  Results Reviewed     Procedure Component Value Units Date/Time    Hemoglobin A1c w/EAG Estimation [[id number]] Collected: 04/26/21 0509    Lab Status: Final result Specimen: Blood from Arm, Left Updated: 04/26/21 1014     Hemoglobin A1C 5 4 %       mg/dl     Lipid Panel with Direct LDL reflex [[id number]]  (Abnormal) Collected: 04/26/21 0509    Lab Status: Final result Specimen: Blood from Arm, Left Updated: 04/26/21 0551     Cholesterol 211 mg/dL      Triglycerides 55 mg/dL      HDL, Direct 48 mg/dL      LDL Calculated 152 mg/dL     Comprehensive metabolic panel [[id number]] Collected: 04/26/21 0509    Lab Status: Final result Specimen: Blood from Arm, Left Updated: 04/26/21 0551     Sodium 142 mmol/L      Potassium 4 1 mmol/L      Chloride 108 mmol/L      CO2 25 mmol/L      ANION GAP 9 mmol/L      BUN 14 mg/dL      Creatinine 0 93 mg/dL      Glucose 93 mg/dL      Glucose, Fasting 93 mg/dL      Calcium 8 3 mg/dL      AST 17 U/L      ALT 33 U/L      Alkaline Phosphatase 66 U/L      Total Protein 6 5 g/dL      Albumin 3 5 g/dL      Total Bilirubin 0 39 mg/dL      eGFR 64 ml/min/1 73sq m     Narrative:      [place] guidelines for Chronic Kidney Disease (CKD):     Stage 1 with normal or high GFR (GFR > 90 mL/min/1 73 square meters)    Stage 2 Mild CKD (GFR = 60-89 mL/min/1 73 square meters)    Stage 3A Moderate CKD (GFR = 45-59 mL/min/1 73 square meters)    Stage 3B Moderate CKD (GFR = 30-44 mL/min/1 73 square meters)    Stage 4 Severe CKD (GFR = 15-29 mL/min/1 73 square meters)    Stage 5 End Stage CKD (GFR <15 mL/min/1 73 square meters)  Note: GFR calculation is accurate only with a steady state creatinine    CBC and differential [[id number]] Collected: 04/26/21 0509    Lab Status: Final result Specimen: Blood from Arm, Left Updated: 04/26/21 0524     WBC 6 78 Thousand/uL      RBC 4 08 Million/uL      Hemoglobin 12 7 g/dL      Hematocrit 37 9 %      MCV 93 fL      MCH 31 1 pg      MCHC 33 5 g/dL      RDW 13 2 %      MPV 10 6 fL      Platelets 522 Thousands/uL      nRBC 0 /100 WBCs      Neutrophils Relative 57 %      Immat GRANS % 0 %      Lymphocytes Relative 30 %      Monocytes Relative 11 %      Eosinophils Relative 1 %      Basophils Relative 1 %      Neutrophils Absolute 3 88 Thousands/µL      Immature Grans Absolute 0 02 Thousand/uL      Lymphocytes Absolute 2 06 Thousands/µL      Monocytes Absolute 0 71 Thousand/µL      Eosinophils Absolute 0 06 Thousand/µL      Basophils Absolute 0 05 Thousands/µL     Troponin I [[id number]]  (Normal) Collected: 04/25/21 2030    Lab Status: Final result Specimen: Blood from Arm, Right Updated: 04/25/21 2310     Troponin I <0 02 ng/mL     Platelet count [[id number]]  (Normal) Collected: 04/25/21 2030    Lab Status: Final result Specimen: Blood from Arm, Right Updated: 04/25/21 2250     Platelets 676 Thousands/uL      MPV 12 4 fL     Troponin I [[id number]]  (Normal) Collected: 04/25/21 1305    Lab Status: Final result Specimen: Blood from Arm, Right Updated: 04/25/21 1358     Troponin I <0 02 ng/mL     APTT [[id number]]  (Normal) Collected: 04/25/21 1305    Lab Status: Final result Specimen: Blood from Arm, Right Updated: 04/25/21 1351     PTT 24 seconds     Basic metabolic panel [[id number]] Collected: 04/25/21 1305    Lab Status: Final result Specimen: Blood from Arm, Right Updated: 04/25/21 1343     Sodium 138 mmol/L      Potassium 4 5 mmol/L      Chloride 106 mmol/L      CO2 21 mmol/L      ANION GAP 11 mmol/L      BUN 16 mg/dL Creatinine 0 87 mg/dL      Glucose 106 mg/dL      Calcium 8 6 mg/dL      eGFR 70 ml/min/1 73sq m     Narrative:      [place] guidelines for Chronic Kidney Disease (CKD):     Stage 1 with normal or high GFR (GFR > 90 mL/min/1 73 square meters)    Stage 2 Mild CKD (GFR = 60-89 mL/min/1 73 square meters)    Stage 3A Moderate CKD (GFR = 45-59 mL/min/1 73 square meters)    Stage 3B Moderate CKD (GFR = 30-44 mL/min/1 73 square meters)    Stage 4 Severe CKD (GFR = 15-29 mL/min/1 73 square meters)    Stage 5 End Stage CKD (GFR <15 mL/min/1 73 square meters)  Note: GFR calculation is accurate only with a steady state creatinine    Protime-INR [[id number]]  (Normal) Collected: 04/25/21 1305    Lab Status: Final result Specimen: Blood from Arm, Right Updated: 04/25/21 1331     Protime 12 6 seconds      INR 0 93    CBC and Platelet [[id number]]  (Normal) Collected: 04/25/21 1305    Lab Status: Final result Specimen: Blood from Arm, Right Updated: 04/25/21 1321     WBC 6 72 Thousand/uL      RBC 4 54 Million/uL      Hemoglobin 14 3 g/dL      Hematocrit 41 9 %      MCV 92 fL      MCH 31 5 pg      MCHC 34 1 g/dL      RDW 13 0 %      Platelets 764 Thousands/uL      MPV 10 8 fL     Fingerstick Glucose (POCT) [[id number]]  (Normal) Collected: 04/25/21 1317    Lab Status: Final result Updated: 04/25/21 1319     POC Glucose 89 mg/dl                  MRI brain w wo contrast   Final Result by Winston Cintron MD (04/26 1132)      No intracranial pathology  Workstation performed: [id number]         CTA head and neck with and without contrast   Final Result by Torrie Montero MD (04/25 1642)      1  No acute intracranial hemorrhage, mass effect or extra-axial collection  2   No hemodynamically significant stenosis, dissection or occlusion of the carotid or vertebral arteries  3   No intracranial aneurysm    No hemodynamically significant stenosis or occlusion of the major vessels of the [place]  Workstation performed: [id number]         CT head without contrast   Final Result by Carlyn Hampton MD (04/25 1348)      No acute intracranial hemorrhage   No extra-axial collection   No CT signs of acute infarction               Workstation performed: [id number]         X-ray chest 1 view portable   Final Result by Danyell Cobb DO (04/26 0800)      No acute cardiopulmonary disease  Workstation performed: [id number]                    Procedures  ECG 12 Lead Documentation Only    Date/Time: 4/25/2021 1:15 PM  Performed by: Adilene Estrada MD  Authorized by: Adilene Estrada MD     Indications / Diagnosis:  Paresthesias  ECG reviewed by me, the ED Provider: yes    Patient location:  ED  Previous ECG:     Previous ECG:  Unavailable    Comparison to cardiac monitor: Yes    Interpretation:     Interpretation: normal    Rate:     ECG rate:  76    ECG rate assessment: normal    Rhythm:     Rhythm: sinus rhythm    Ectopy:     Ectopy: none    QRS:     QRS axis:  Normal  Conduction:     Conduction: normal    ST segments:     ST segments:  Normal  T waves:     T waves: normal               ED Course  ED Course as of May 02 0857   Sun Apr 25, 2021   1350 IMPRESSION:     No acute intracranial hemorrhage  No extra-axial collection  No CT signs of acute infarction      CT head without contrast   1405 wnl   Troponin I: <0 02   1443 Case discussed with Neurology, requests head/neck CTA, if negative ok for follow up as outpatient with ASA      1647 IMPRESSION:     1  No acute intracranial hemorrhage, mass effect or extra-axial collection  2   No hemodynamically significant stenosis, dissection or occlusion of the carotid or vertebral arteries  3   No intracranial aneurysm  No hemodynamically significant stenosis or occlusion of the major vessels of the [place]      CTA head and neck with and without contrast                             SBIRT 22yo+      Most Recent Value SBIRT (25 yo +)   In order to provide better care to our patients, we are screening all of our patients for alcohol and drug use  Would it be okay to ask you these screening questions? Yes Filed at: 04/25/2021 1330   Initial Alcohol Screen: US AUDIT-C    1  How often do you have a drink containing alcohol? 2 Filed at: 04/25/2021 1330   2  How many drinks containing alcohol do you have on a typical day you are drinking? 1 Filed at: 04/25/2021 1330   3a  Male UNDER 65: How often do you have five or more drinks on one occasion? 0 Filed at: 04/25/2021 1330   3b  FEMALE Any Age, or MALE 65+: How often do you have 4 or more drinks on one occassion? 0 Filed at: 04/25/2021 1330   Audit-C Score  3 Filed at: 04/25/2021 1330   [place]OR[place]: How many times in the past year have you    Used an illegal drug or used a prescription medication for non-medical reasons? Never Filed at: 04/25/2021 1330                    MDM  Number of Diagnoses or Management Options  TIA (transient ischemic attack):   Diagnosis management comments: DDx TIA/CVA, radiculopathy (although would not explain LE symptoms), anxiety  Work up in ED reassuring  Pt symptom free  Discussed in real time with Neurology who recommends CTA and potential dispo if negative for further outpatient work up  Pt uncomfortable with that dispo, pt admitted for TIA/CVA eval and further testing/treatming/intevention as warranted  HD stable, neuro intact throughout ED stay  No indication for TPA           Amount and/or Complexity of Data Reviewed  Clinical lab tests: ordered and reviewed  Tests in the radiology section of CPT®: ordered and reviewed  Tests in the medicine section of CPT®: ordered and reviewed  Review and summarize past medical records: yes  Discuss the patient with other providers: yes  Independent visualization of images, tracings, or specimens: yes    Risk of Complications, Morbidity, and/or Mortality  Presenting problems: moderate  Diagnostic procedures: moderate  Management options: moderate    Patient Progress  Patient progress: improved      Disposition  Final diagnoses:   TIA (transient ischemic attack)     Time reflects when diagnosis was documented in both MDM as applicable and the Disposition within this note     Time User Action Codes Description Comment    4/25/2021  4:50 PM Paulo Gipson Add [G45 9] TIA (transient ischemic attack)     4/26/2021  3:57 PM Niharika Jules Add [R20 0,  R20 2] Numbness and tingling of left upper and lower extremity       ED Disposition     ED Disposition Condition Date/Time Comment    Admit Stable Sun Apr 25, 2021 1650 Case was discussed with Deborah Griffith and the patient's admission status was agreed to be Admission Status: observation status to the service of Dr Cyrus Mcburney           Follow-up Information     Follow up With Specialties Details Why Contact Info Additional Information    Val White MD Internal Medicine Follow up in 1 week(s)  [address] [facility] [address]       [place] [facility] [address] [facility] Neurology Follow up in 4 week(s)  [address] [facility] [address] [facility], [address] [facility], [address] [place], [place], [facility]          Discharge Medication List as of 4/26/2021  4:46 PM      CONTINUE these medications which have NOT CHANGED    Details   Ascorbic Acid (VITAMIN C) 100 MG tablet Take 100 mg by mouth daily, Historical Med      Cholecalciferol (CVS VITAMIN D3) 1000 units capsule Take by mouth, Historical Med      Garlic 10 MG CAPS Take by mouth, Historical Med      metoprolol succinate (TOPROL-XL) 50 mg 24 hr tablet Take 50 mg by mouth daily, Historical Med      Omega-3 Fatty Acids (FISH OIL) 1,000 mg Take 1,000 mg by mouth daily, Historical Med               PDMP Review     None          ED Provider  Electronically Signed by           Jaime Martin MD  05/02/21 8917

## 2021-05-17 ENCOUNTER — TELEPHONE (OUTPATIENT)
Dept: NEUROLOGY | Facility: CLINIC | Age: 66
End: 2021-05-17

## 2021-06-18 ENCOUNTER — HOSPITAL ENCOUNTER (OUTPATIENT)
Dept: RADIOLOGY | Facility: MEDICAL CENTER | Age: 66
Discharge: HOME | End: 2021-06-18
Payer: MEDICARE

## 2021-06-18 VITALS — WEIGHT: 155 LBS | BODY MASS INDEX: 26.46 KG/M2 | HEIGHT: 64 IN

## 2021-06-18 DIAGNOSIS — Z12.31 SCREENING MAMMOGRAM, ENCOUNTER FOR: ICD-10-CM

## 2021-06-18 PROCEDURE — 77063 BREAST TOMOSYNTHESIS BI: CPT

## 2021-06-18 PROCEDURE — 77067 SCR MAMMO BI INCL CAD: CPT

## 2021-06-22 DIAGNOSIS — R92.8 ABNORMAL MAMMOGRAM: Primary | ICD-10-CM

## 2021-07-06 ENCOUNTER — HOSPITAL ENCOUNTER (OUTPATIENT)
Dept: MAMMOGRAPHY | Facility: CLINIC | Age: 66
Discharge: HOME | End: 2021-07-06
Payer: MEDICARE

## 2021-07-06 ENCOUNTER — HOSPITAL ENCOUNTER (OUTPATIENT)
Dept: ULTRASOUND IMAGING | Facility: CLINIC | Age: 66
Discharge: HOME | End: 2021-07-06
Payer: MEDICARE

## 2021-07-06 DIAGNOSIS — R92.8 ABNORMAL MAMMOGRAM: ICD-10-CM

## 2021-07-06 PROCEDURE — G0279 TOMOSYNTHESIS, MAMMO: HCPCS

## 2021-07-06 PROCEDURE — 76642 ULTRASOUND BREAST LIMITED: CPT

## 2021-07-06 PROCEDURE — 77065 DX MAMMO INCL CAD UNI: CPT

## 2021-07-06 NOTE — PROGRESS NOTES
Met with patient and Dr Lakesha Rhodes  regarding recommendation for;    _____ RIGHT ___X___LEFT      __X___Ultrasound guided  ______Stereotactic breast biopsy x2       __X___Verbalized understanding        Blood thinners:  No: __X___ Yes: ______ What:                 Biopsy teaching sheet given:  Yes: ___X___ No: ________    Pt given contact information and adv to call with any questions/needs

## 2021-07-12 ENCOUNTER — HOSPITAL ENCOUNTER (OUTPATIENT)
Dept: MAMMOGRAPHY | Facility: CLINIC | Age: 66
Discharge: HOME | End: 2021-07-12

## 2021-07-12 ENCOUNTER — HOSPITAL ENCOUNTER (OUTPATIENT)
Dept: ULTRASOUND IMAGING | Facility: CLINIC | Age: 66
Discharge: HOME | End: 2021-07-12
Admitting: RADIOLOGY
Payer: MEDICARE

## 2021-07-12 VITALS — SYSTOLIC BLOOD PRESSURE: 153 MMHG | DIASTOLIC BLOOD PRESSURE: 78 MMHG | HEART RATE: 79 BPM

## 2021-07-12 DIAGNOSIS — R92.8 ABNORMAL MAMMOGRAM: ICD-10-CM

## 2021-07-12 PROCEDURE — 88361 TUMOR IMMUNOHISTOCHEM/COMPUT: CPT | Performed by: PATHOLOGY

## 2021-07-12 PROCEDURE — 88305 TISSUE EXAM BY PATHOLOGIST: CPT | Performed by: PATHOLOGY

## 2021-07-12 PROCEDURE — 19083 BX BREAST 1ST LESION US IMAG: CPT

## 2021-07-12 PROCEDURE — A4648 IMPLANTABLE TISSUE MARKER: HCPCS

## 2021-07-12 PROCEDURE — 88341 IMHCHEM/IMCYTCHM EA ADD ANTB: CPT | Performed by: PATHOLOGY

## 2021-07-12 PROCEDURE — 88342 IMHCHEM/IMCYTCHM 1ST ANTB: CPT | Performed by: PATHOLOGY

## 2021-07-12 PROCEDURE — 88374 M/PHMTRC ALYS ISHQUANT/SEMIQ: CPT | Performed by: PATHOLOGY

## 2021-07-12 PROCEDURE — 19084 BX BREAST ADD LESION US IMAG: CPT

## 2021-07-12 RX ORDER — LIDOCAINE HYDROCHLORIDE 10 MG/ML
5 INJECTION, SOLUTION EPIDURAL; INFILTRATION; INTRACAUDAL; PERINEURAL ONCE
Status: COMPLETED | OUTPATIENT
Start: 2021-07-12 | End: 2021-07-12

## 2021-07-12 RX ADMIN — LIDOCAINE HYDROCHLORIDE 5 ML: 10 INJECTION, SOLUTION EPIDURAL; INFILTRATION; INTRACAUDAL; PERINEURAL at 14:21

## 2021-07-12 RX ADMIN — LIDOCAINE HYDROCHLORIDE 5 ML: 10 INJECTION, SOLUTION EPIDURAL; INFILTRATION; INTRACAUDAL; PERINEURAL at 14:18

## 2021-07-12 NOTE — PROGRESS NOTES
Procedure type:    _____ultrasound guided _____stereotactic    Breast:    ___x__Left _____Right    Location: 2 o'clock 3 cmfn    Needle: 12 gauge krystyna     # of passes: 2    Clip: Heart     Performed by: Dr Liliana Hicks held for 5 minutes by: Bridger Esteban Strips:    ___x__yes _____no    Band aid:    __x___yes_____no    Tape and guaze:    _____yes ___x__no    Tolerated procedure:    ___x__yes _____no

## 2021-07-12 NOTE — DISCHARGE INSTR - OTHER ORDERS
POST LARGE CORE BREAST BIOPSY PATIENT INFORMATION      1  Place an ice pack inside your bra over the top of the dressing every hour for 20 minutes (20 minutes on, 60 minutes off)  Do this until bedtime  2  Do not shower or bathe until the following morning  3  You may bathe your breast carefully with the steri-strips in place  Be careful    Not to loosen them  The steri-strips will fall off in 3-5 days  4  You may have mild discomfort, and you may have some bruising where the   Needle entered the skin  This should clear within 5-7 days  5  If you need medicine for discomfort, take acetaminophen products such as   Tylenol  You may also take Advil or Motrin products  6  Do not participate in strenuous activities such as-tennis, aerobics, skiing,  Weight lifting, etc  for 24 hours  Refrain from swimming/soaking for 72 hours  7  Wearing a bra for sleeping may be more comfortable for the first 24-48 hours  8  Watch for continued bleeding, pain or fever over 101; please call with any questions or concerns  For procedures done at the [facility] call:  Suzy Franz RN at [facility] RN at [phone]                    *After 4 PM call the Interventional Radiology Department                    [phone] and ask to speak with the nurse on call  For procedures done at the [address] [place] [address] call:         Rahul Ward RN at   *After 4 PM call the Interventional Radiology Department   [phone] and ask to speak with the nurse on call  For procedures done at [address] [place] [address] call: The Radiology Nurse at [phone]  *After 4 PM call your physician, or go to the Emergency Department  9          The final results of your biopsy are usually available within one week

## 2021-07-12 NOTE — PROGRESS NOTES
Procedure type:    _____ultrasound guided _____stereotactic    Breast:    ___x__Left _____Right    Location: 2 o'clock 7 cmfn     Needle: 12 gauge krystyna     # of passes: 3    Clip: Ribbon     Performed by: Dr Pj Acharya held for 5 minutes by: Allie Esteban Strips:    __x___yes _____no    Band aid:    ___x__yes_____no    Tape and guaze:    _____yes __x___no    Tolerated procedure:    ___x__yes _____no

## 2021-07-12 NOTE — PROGRESS NOTES
Ice pack given:    ___x__yes _____no    Discharge instructions signed by patient:    ___x__yes _____no    Discharge instructions given to patient:    __x___yes _____no    Discharged via:    __x___ambulatory    _____wheelchair    _____stretcher    Stable on discharge:    __x___yes ____no  Patient is to receive results from Dr Sarah Bee  Ok to leave a message

## 2021-07-14 NOTE — PROGRESS NOTES
Post procedure call completed    Bleeding: _____yes __X___no (pt denies)    Pain: _____yes ___X___no (pt denies)    Redness/Swelling: ______yes ___X___no (pt with a little bruising)    Band aid removed: __X___yes _____no    Steri-Strips intact: ___X___yes _____no (discussed with patient to remove steri strips on Saturday if they have not come off on their own)    Pt with no questions at this time, adv Dr Rosalia Cueto office will call when results available, adv to call with any questions or concerns, has name/# for contact

## 2021-07-20 ENCOUNTER — TELEPHONE (OUTPATIENT)
Dept: SURGICAL ONCOLOGY | Facility: CLINIC | Age: 66
End: 2021-07-20

## 2021-07-20 ENCOUNTER — TELEPHONE (OUTPATIENT)
Dept: HEMATOLOGY ONCOLOGY | Facility: CLINIC | Age: 66
End: 2021-07-20

## 2021-07-20 NOTE — TELEPHONE ENCOUNTER
Called in regards to scheduling a follow up to discuss new cancer diagnosis per Dr Deven Dawkins request  Patient has been called and scheduled for 8/5/21 at 1pm at the [facility] with Dr Candelaria Paredes

## 2021-07-20 NOTE — TELEPHONE ENCOUNTER
David Maria called in today wanting to make an appointment to see Dr Galindo Chen in regards to her most recent Mammo post biopsy left 7-  The patient can be reached at her [phone] number

## 2021-07-20 NOTE — TELEPHONE ENCOUNTER
Patient is questioning if that appt is too far out to discuss her diagnosis  Please reach back to patient at [phone]

## 2021-08-05 ENCOUNTER — OFFICE VISIT (OUTPATIENT)
Dept: SURGICAL ONCOLOGY | Facility: CLINIC | Age: 66
End: 2021-08-05
Payer: MEDICARE

## 2021-08-05 VITALS
RESPIRATION RATE: 16 BRPM | BODY MASS INDEX: 25.61 KG/M2 | WEIGHT: 150 LBS | OXYGEN SATURATION: 97 % | SYSTOLIC BLOOD PRESSURE: 158 MMHG | HEIGHT: 64 IN | HEART RATE: 109 BPM | DIASTOLIC BLOOD PRESSURE: 90 MMHG | TEMPERATURE: 98.4 F

## 2021-08-05 DIAGNOSIS — Z17.0 MALIGNANT NEOPLASM OF UPPER-OUTER QUADRANT OF LEFT BREAST IN FEMALE, ESTROGEN RECEPTOR POSITIVE (HCC): Primary | ICD-10-CM

## 2021-08-05 DIAGNOSIS — C50.412 MALIGNANT NEOPLASM OF UPPER-OUTER QUADRANT OF LEFT BREAST IN FEMALE, ESTROGEN RECEPTOR POSITIVE (HCC): Primary | ICD-10-CM

## 2021-08-05 PROCEDURE — 99215 OFFICE O/P EST HI 40 MIN: CPT | Performed by: SURGERY

## 2021-08-05 RX ORDER — ENOXAPARIN SODIUM 300 MG/3ML
30 INJECTION INTRAVENOUS; SUBCUTANEOUS
Status: CANCELLED | OUTPATIENT
Start: 2021-08-05

## 2021-08-05 RX ORDER — ATORVASTATIN CALCIUM 20 MG/1
10 TABLET, FILM COATED ORAL
COMMUNITY
Start: 2021-05-21

## 2021-08-05 RX ORDER — PAROXETINE HYDROCHLORIDE 20 MG/1
20 TABLET, FILM COATED ORAL EVERY MORNING
COMMUNITY
Start: 2021-06-18

## 2021-08-05 RX ORDER — TRAMADOL HYDROCHLORIDE 50 MG/1
50 TABLET ORAL EVERY 8 HOURS PRN
Qty: 9 TABLET | Refills: 0 | Status: SHIPPED | OUTPATIENT
Start: 2021-08-05

## 2021-08-05 NOTE — PROGRESS NOTES
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 in female, estrogen receptor positive [facility])            Advance Care Planning/Advance Directives:  Discussed disease status, cancer treatment plans and/or cancer treatment goals with the patient  Oncology History:    Oncology History   Malignant neoplasm of upper-outer quadrant of left breast in female, estrogen receptor positive ([facility] [address] )   8/5/2021 Initial Diagnosis    Malignant neoplasm of upper-outer quadrant of left breast in female, estrogen receptor positive ([facility] [address] )     8/5/2021 -  Cancer Staged    Staging form: Breast, AJCC 8th Edition  - Clinical: Stage IA (cT1a, cN0, cM0, G1, ER+, [place]-, HER2-) - Signed by Yamil Ríos MD on 8/5/2021  Stage prefix: Initial diagnosis  Method of lymph node assessment: Clinical  Histologic grading system: 3 grade system           History of Present Illness:   Patient is here today secondary to a newly diagnosed carcinoma of the left breast, she was previously followed by me for benign breast disease  -Interval History: as noted    Review of Systems:  Review of Systems   Constitutional: Negative  Negative for appetite change and fever  Eyes: Negative  Respiratory: Negative for shortness of breath  Cardiovascular: Negative  Gastrointestinal: Negative  Endocrine: Negative  Genitourinary: Negative  Musculoskeletal: Negative  Negative for arthralgias and myalgias  Skin: Negative  Allergic/Immunologic: Negative  Neurological: Negative      Hematological: Negative  Negative for adenopathy  Does not bruise/bleed easily  Psychiatric/Behavioral: Negative          Patient Active Problem List   Diagnosis    Fibrocystic breast disease    Screening mammogram, encounter for    Closed nondisplaced transverse fracture of right patella    Numbness and tingling of left upper and lower extremity    Essential hypertension    TIA (transient ischemic attack)    Abnormal mammogram    Malignant neoplasm of upper-outer quadrant of left breast in female, estrogen receptor positive ([address] )     Past Medical History:   Diagnosis Date    Acute URI     Arthritis     Atypical chest pain     Fibrocystic breast disease     Osteoporosis     Palpitations     Psoriasis     Rib pain on left side     Viral conjunctivitis      Past Surgical History:   Procedure Laterality Date    FOOT SURGERY      US GUIDANCE BREAST BIOPSY LEFT EACH ADDITIONAL Left 7/12/2021    US GUIDED BREAST BIOPSY LEFT COMPLETE Left 7/12/2021     Family History   Problem Relation Age of Onset    Skin cancer Father         age dx unk    Perfecto Marjan Lung cancer Maternal Grandfather         age dx unk     No Known Problems Mother     No Known Problems Daughter     No Known Problems Maternal Grandmother     No Known Problems Paternal Grandmother     No Known Problems Paternal Grandfather     No Known Problems Maternal Aunt     No Known Problems Maternal Aunt     No Known Problems Maternal Aunt     No Known Problems Cousin     No Known Problems Cousin     No Known Problems Paternal Aunt     No Known Problems Cousin     No Known Problems Cousin      Social History     Socioeconomic History    Marital status: /Civil Union     Spouse name: Not on file    Number of children: Not on file    Years of education: Not on file    Highest education level: Not on file   Occupational History    Not on file   Tobacco Use    Smoking status: Never Smoker    Smokeless tobacco: Never Used   Vaping Use    Vaping Use: Never used   Substance and Sexual Activity    Alcohol use: Yes     Comment: social     Drug use: Not Currently    Sexual activity: Not on file   Other Topics Concern    Not on file   Social History Narrative    Not on file     Social Determinants of Health     Financial Resource Strain:     Difficulty of Paying Living Expenses:    Food Insecurity:     Worried About Running Out of Food in the Last Year:     [address] in the Last Year:    Transportation Needs:     Lack of Transportation (Medical):  Lack of Transportation (Non-Medical):    Physical Activity:     Days of Exercise per Week:     Minutes of Exercise per Session:    Stress:     Feeling of Stress :    Social Connections:     Frequency of Communication with Friends and Family:     Frequency of Social Gatherings with Friends and Family:     Attends Yazidi Services:     Active Member of Clubs or Organizations:     Attends Club or Organization Meetings:     Marital Status:    Intimate Partner Violence:     Fear of Current or Ex-Partner:     Emotionally Abused:     Physically Abused:     Sexually Abused:        Current Outpatient Medications:     atorvastatin (LIPITOR) 20 mg tablet, , Disp: , Rfl:     PARoxetine (PAXIL) 20 mg tablet, , Disp: , Rfl:     traMADol (ULTRAM) 50 mg tablet, Take 1 tablet (50 mg total) by mouth every 8 (eight) hours as needed for moderate pain, Disp: 9 tablet, Rfl: 0  No Known Allergies    The following portions of the patient's history were reviewed and updated as appropriate: allergies, current medications, past family history, past medical history, past social history, past surgical history and problem list         Vitals:    08/05/21 1248   BP: 158/90   Pulse: (!) 109   Resp: 16   Temp: 98 4 °F (36 9 °C)   SpO2: 97%       Physical Exam  Constitutional:       General: She is not in acute distress  Appearance: Normal appearance  She is well-developed     HENT:      Head: Normocephalic and atraumatic  Cardiovascular:      Heart sounds: Normal heart sounds  Pulmonary:      Breath sounds: Normal breath sounds  Chest:      Breasts:         Right: No inverted nipple, mass, nipple discharge, skin change or tenderness  Left: No inverted nipple, mass, nipple discharge, skin change or tenderness  Abdominal:      Palpations: Abdomen is soft  Musculoskeletal:      Right lower leg: No edema  Left lower leg: No edema  Lymphadenopathy:      Upper Body:      Right upper body: No supraclavicular, axillary or pectoral adenopathy  Left upper body: No supraclavicular, axillary or pectoral adenopathy  Neurological:      Mental Status: She is alert and oriented to person, place, and time  Psychiatric:         Mood and Affect: Mood normal            Results:  Labs:   07/12/2021 core biopsy left breast 0200 hours 3 cm from the nipple showed benign breast tissue with fibrocystic changes which is concordant with her imaging   07/12/2021 core biopsy left breast 0200 hours 7 cm from the nipple reveals invasive ductal carcinoma grade 1 ER is positive at 95%, ND negative, HER2 was 2+ equivocal by IHC but negative on the fish analysis    Imaging  6/18/21 bilateral 3d screening mammogram B0 for a left asymmetry, right side was benign, density is a one     07/06/2021 left 3D diagnostic mammogram and ultrasound shows a 5 mm correlate corresponding to the mammographic finding at the 0200 hours axis 7 cm from the nipple for which biopsy was recommended, there was an additional lesion that appeared hypoechoic and a biopsy was also  Recommended of this area, biopsies as noted above     07/12/2021 core biopsy x2 as well as post biopsy mammogram shows clip concordance with the one area only showing invasive carcinoma measuring 5 mm in size    I reviewed the above laboratory and imaging data  Discussion/Summary: 42-year-old female who was followed by me for benign breast disease    She now presents with an abnormal mammogram which led to diagnostic imaging and a biopsy  She has a clinical T1a N0 grade 1 invasive ductal carcinoma  This is ER positive and HER2 Elias negative by fish analysis  This is a stage IA cancer  I discussed these findings with her  She is a good candidate for breast conservation  She would like to proceed in this fashion  I am recommending a CHELLY reflector for localization purposes  She also will need a sentinel node biopsy  We did discuss radiation options  I am referring her to Radiation Oncology to discuss intraoperative radiation therapy  She does prefer to proceed in this fashion if possible  She understands that she will meet with Medical Oncology in the postoperative setting as well  All of her questions were answered today  Consent was signed in the office  She will be referred to Radiation Oncology to discuss the IORT  We will coordinate surgery thereafter

## 2021-08-05 NOTE — H&P (VIEW-ONLY)
Surgical Oncology Follow Up       [address] [place] [address],[address]  [facility] [place]  [address] [facility] [address]  [facility] [address]    Solorio Gist  1955  [id number]  [address] [facility],[address]  [facility] [place]  [address] [facility] [address]    Chief Complaint   Patient presents with    Follow-up     CONSULT       Assessment/Plan   Diagnoses and all orders for this visit:    Malignant neoplasm of upper-outer quadrant of left breast in female, estrogen receptor positive [facility])            Advance Care Planning/Advance Directives:  Discussed disease status, cancer treatment plans and/or cancer treatment goals with the patient  Oncology History:    Oncology History   Malignant neoplasm of upper-outer quadrant of left breast in female, estrogen receptor positive ([facility] [address] )   8/5/2021 Initial Diagnosis    Malignant neoplasm of upper-outer quadrant of left breast in female, estrogen receptor positive ([facility] [address] )     8/5/2021 -  Cancer Staged    Staging form: Breast, AJCC 8th Edition  - Clinical: Stage IA (cT1a, cN0, cM0, G1, ER+, [place]-, HER2-) - Signed by Laura Amezcua MD on 8/5/2021  Stage prefix: Initial diagnosis  Method of lymph node assessment: Clinical  Histologic grading system: 3 grade system           History of Present Illness:   Patient is here today secondary to a newly diagnosed carcinoma of the left breast, she was previously followed by me for benign breast disease  -Interval History: as noted    Review of Systems:  Review of Systems   Constitutional: Negative  Negative for appetite change and fever  Eyes: Negative  Respiratory: Negative for shortness of breath  Cardiovascular: Negative  Gastrointestinal: Negative  Endocrine: Negative  Genitourinary: Negative  Musculoskeletal: Negative  Negative for arthralgias and myalgias  Skin: Negative  Allergic/Immunologic: Negative  Neurological: Negative      Hematological: Negative  Negative for adenopathy  Does not bruise/bleed easily  Psychiatric/Behavioral: Negative          Patient Active Problem List   Diagnosis    Fibrocystic breast disease    Screening mammogram, encounter for    Closed nondisplaced transverse fracture of right patella    Numbness and tingling of left upper and lower extremity    Essential hypertension    TIA (transient ischemic attack)    Abnormal mammogram    Malignant neoplasm of upper-outer quadrant of left breast in female, estrogen receptor positive ([address] )     Past Medical History:   Diagnosis Date    Acute URI     Arthritis     Atypical chest pain     Fibrocystic breast disease     Osteoporosis     Palpitations     Psoriasis     Rib pain on left side     Viral conjunctivitis      Past Surgical History:   Procedure Laterality Date    FOOT SURGERY      US GUIDANCE BREAST BIOPSY LEFT EACH ADDITIONAL Left 7/12/2021    US GUIDED BREAST BIOPSY LEFT COMPLETE Left 7/12/2021     Family History   Problem Relation Age of Onset    Skin cancer Father         age dx unk    Asha Day Lung cancer Maternal Grandfather         age dx unk     No Known Problems Mother     No Known Problems Daughter     No Known Problems Maternal Grandmother     No Known Problems Paternal Grandmother     No Known Problems Paternal Grandfather     No Known Problems Maternal Aunt     No Known Problems Maternal Aunt     No Known Problems Maternal Aunt     No Known Problems Cousin     No Known Problems Cousin     No Known Problems Paternal Aunt     No Known Problems Cousin     No Known Problems Cousin      Social History     Socioeconomic History    Marital status: /Civil Union     Spouse name: Not on file    Number of children: Not on file    Years of education: Not on file    Highest education level: Not on file   Occupational History    Not on file   Tobacco Use    Smoking status: Never Smoker    Smokeless tobacco: Never Used   Vaping Use    Vaping Use: Never used   Substance and Sexual Activity    Alcohol use: Yes     Comment: social     Drug use: Not Currently    Sexual activity: Not on file   Other Topics Concern    Not on file   Social History Narrative    Not on file     Social Determinants of Health     Financial Resource Strain:     Difficulty of Paying Living Expenses:    Food Insecurity:     Worried About Running Out of Food in the Last Year:     [address] in the Last Year:    Transportation Needs:     Lack of Transportation (Medical):  Lack of Transportation (Non-Medical):    Physical Activity:     Days of Exercise per Week:     Minutes of Exercise per Session:    Stress:     Feeling of Stress :    Social Connections:     Frequency of Communication with Friends and Family:     Frequency of Social Gatherings with Friends and Family:     Attends Mosque Services:     Active Member of Clubs or Organizations:     Attends Club or Organization Meetings:     Marital Status:    Intimate Partner Violence:     Fear of Current or Ex-Partner:     Emotionally Abused:     Physically Abused:     Sexually Abused:        Current Outpatient Medications:     atorvastatin (LIPITOR) 20 mg tablet, , Disp: , Rfl:     PARoxetine (PAXIL) 20 mg tablet, , Disp: , Rfl:     traMADol (ULTRAM) 50 mg tablet, Take 1 tablet (50 mg total) by mouth every 8 (eight) hours as needed for moderate pain, Disp: 9 tablet, Rfl: 0  No Known Allergies    The following portions of the patient's history were reviewed and updated as appropriate: allergies, current medications, past family history, past medical history, past social history, past surgical history and problem list         Vitals:    08/05/21 1248   BP: 158/90   Pulse: (!) 109   Resp: 16   Temp: 98 4 °F (36 9 °C)   SpO2: 97%       Physical Exam  Constitutional:       General: She is not in acute distress  Appearance: Normal appearance  She is well-developed     HENT:      Head: Normocephalic and atraumatic  Cardiovascular:      Heart sounds: Normal heart sounds  Pulmonary:      Breath sounds: Normal breath sounds  Chest:      Breasts:         Right: No inverted nipple, mass, nipple discharge, skin change or tenderness  Left: No inverted nipple, mass, nipple discharge, skin change or tenderness  Abdominal:      Palpations: Abdomen is soft  Musculoskeletal:      Right lower leg: No edema  Left lower leg: No edema  Lymphadenopathy:      Upper Body:      Right upper body: No supraclavicular, axillary or pectoral adenopathy  Left upper body: No supraclavicular, axillary or pectoral adenopathy  Neurological:      Mental Status: She is alert and oriented to person, place, and time  Psychiatric:         Mood and Affect: Mood normal            Results:  Labs:   07/12/2021 core biopsy left breast 0200 hours 3 cm from the nipple showed benign breast tissue with fibrocystic changes which is concordant with her imaging   07/12/2021 core biopsy left breast 0200 hours 7 cm from the nipple reveals invasive ductal carcinoma grade 1 ER is positive at 95%, VA negative, HER2 was 2+ equivocal by IHC but negative on the fish analysis    Imaging  6/18/21 bilateral 3d screening mammogram B0 for a left asymmetry, right side was benign, density is a one     07/06/2021 left 3D diagnostic mammogram and ultrasound shows a 5 mm correlate corresponding to the mammographic finding at the 0200 hours axis 7 cm from the nipple for which biopsy was recommended, there was an additional lesion that appeared hypoechoic and a biopsy was also  Recommended of this area, biopsies as noted above     07/12/2021 core biopsy x2 as well as post biopsy mammogram shows clip concordance with the one area only showing invasive carcinoma measuring 5 mm in size    I reviewed the above laboratory and imaging data  Discussion/Summary: 58-year-old female who was followed by me for benign breast disease    She now presents with an abnormal mammogram which led to diagnostic imaging and a biopsy  She has a clinical T1a N0 grade 1 invasive ductal carcinoma  This is ER positive and HER2 Elias negative by fish analysis  This is a stage IA cancer  I discussed these findings with her  She is a good candidate for breast conservation  She would like to proceed in this fashion  I am recommending a CHELLY reflector for localization purposes  She also will need a sentinel node biopsy  We did discuss radiation options  I am referring her to Radiation Oncology to discuss intraoperative radiation therapy  She does prefer to proceed in this fashion if possible  She understands that she will meet with Medical Oncology in the postoperative setting as well  All of her questions were answered today  Consent was signed in the office  She will be referred to Radiation Oncology to discuss the IORT  We will coordinate surgery thereafter

## 2021-08-09 ENCOUNTER — TELEPHONE (OUTPATIENT)
Dept: SURGICAL ONCOLOGY | Facility: CLINIC | Age: 66
End: 2021-08-09

## 2021-08-09 NOTE — TELEPHONE ENCOUNTER
Called and spoke to patient regarding surgery date of 8/26/2021 at [facility]  Per patient she is agreeable to surgery date  She is aware she will be having her consult with Dr Nury Rangel instead of the original appointment with Dr Wlilie Arriaga on 8/12/2021 and that Vin Luque from their office will be reaching out to her about the appointment  She is agreeable to see her at either location  The patient is aware that she will also be receiving a call form the [facility] in regards to having the CHELLY clip placed pre op  (call placed today to [address] [place] [address] in regards to setting appointment up)An is also aware I will be calling her back to go over her PAT instructions and post op appointment  She has no questions at this time

## 2021-08-09 NOTE — PROGRESS NOTES
Call placed to patient regarding recommendation for;    _____ RIGHT ___X___LEFT      __X___SAVI  placement  Procedure explained to patient, additional questions answered at this time    __X___Verbalized understanding        Blood thinners:  _____yes __X___no    Date stopped: ___N/A____    All teaching points discussed during call, pt with no questions at this time, pt adv to arrive at 1330 for 1400 insertion    Pt given name/# for any further questions/needs

## 2021-08-10 ENCOUNTER — TELEPHONE (OUTPATIENT)
Dept: SURGICAL ONCOLOGY | Facility: CLINIC | Age: 66
End: 2021-08-10

## 2021-08-10 NOTE — TELEPHONE ENCOUNTER
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

## 2021-08-13 ENCOUNTER — CLINICAL SUPPORT (OUTPATIENT)
Dept: RADIATION ONCOLOGY | Facility: CLINIC | Age: 66
End: 2021-08-13
Attending: RADIOLOGY
Payer: MEDICARE

## 2021-08-13 VITALS
TEMPERATURE: 98.5 F | SYSTOLIC BLOOD PRESSURE: 166 MMHG | WEIGHT: 152.34 LBS | DIASTOLIC BLOOD PRESSURE: 86 MMHG | HEIGHT: 64 IN | OXYGEN SATURATION: 98 % | HEART RATE: 92 BPM | BODY MASS INDEX: 26.01 KG/M2 | RESPIRATION RATE: 18 BRPM

## 2021-08-13 DIAGNOSIS — C50.412 MALIGNANT NEOPLASM OF UPPER-OUTER QUADRANT OF LEFT BREAST IN FEMALE, ESTROGEN RECEPTOR POSITIVE (HCC): ICD-10-CM

## 2021-08-13 DIAGNOSIS — Z17.0 MALIGNANT NEOPLASM OF UPPER-OUTER QUADRANT OF LEFT BREAST IN FEMALE, ESTROGEN RECEPTOR POSITIVE (HCC): Primary | ICD-10-CM

## 2021-08-13 DIAGNOSIS — C50.412 MALIGNANT NEOPLASM OF UPPER-OUTER QUADRANT OF LEFT BREAST IN FEMALE, ESTROGEN RECEPTOR POSITIVE (HCC): Primary | ICD-10-CM

## 2021-08-13 DIAGNOSIS — Z17.0 MALIGNANT NEOPLASM OF UPPER-OUTER QUADRANT OF LEFT BREAST IN FEMALE, ESTROGEN RECEPTOR POSITIVE (HCC): ICD-10-CM

## 2021-08-13 PROCEDURE — 99204 OFFICE O/P NEW MOD 45 MIN: CPT | Performed by: RADIOLOGY

## 2021-08-13 PROCEDURE — 99211 OFF/OP EST MAY X REQ PHY/QHP: CPT | Performed by: RADIOLOGY

## 2021-08-13 NOTE — PROGRESS NOTES
Vanita Coley 1955 is a 76 y o  female     Patient is seen today for IORT consult for newly diagnosed left breast cancer  She has been referred by Dr Garcia  Patient is accompanied by her spouse, Percy Patrick for today's visit  Vanita Coley is a 76year old female with history of fibrocystic changes of the breast, she has been followed by Dr Garcia for benign breast disease  She now presents with abnormal screening mammogram performed on 6/18/21  This was her first 3d mammogram  Focal asymmetry was noted in the upper outer quadrant of the left breast  This led to diagnostic imaging and subsequent biopsy  7/12/21 left breast biopsy revealed invasive breast carcinoma, grade 1, no LVI, ER positive, NE negative, HER2 by FISH negative disease  No family history of breast cancer  She met with Dr Garcia on 8/5/21, her recommendation is for breast conservation therapy  She has referred the patient to radiation therapy for consideration of IORT (intra-operative radiation therapy)  6/18/21 Mammo screening bilateral w 3d & cad  FINDINGS:   LEFT  1) FOCAL ASYMMETRY: There is a 9 mm focal asymmetry seen in the upper outer quadrant of the left breast in the posterior depth, 7 4 cm from the nipple on the MLO and CC views  Associated features include architectural distortion  Right  There are no suspicious masses, grouped microcalcifications or areas of architectural distortion  The skin and nipple areolar complex are unremarkable  Some benign calcifications are present  Stable small nodule medially  IMPRESSION:  Additional imaging required  7/6/21 Mammo diagnostic left w 3d & cad; US breast left limited (diagnostic)  FINDINGS:   LEFT  1) FOCAL ASYMMETRY  Mammo diagnostic left w 3d & cad: There is a 9 mm focal asymmetry seen in the upper outer quadrant of the left breast in the posterior depth, 7 4 cm from the nipple   Associated features include architectural distortion which is more conspicuous on straight lateral tomosynthesis  US breast left limited (diagnostic): There is a 5 mm x 4 mm x 5 mm irregularly shaped, non-parallel, heterogeneous mass with indistinct margins with shadowing seen in the upper outer quadrant of the left breast in the posterior depth, 7 cm from the nipple  The mass correlates with the prior mammogram finding  Ultrasound-guided core biopsy recommended  2) MASS  Mammo diagnostic left w 3d & cad: There are no corresponding masses seen on this modality  US breast left limited (diagnostic): There is a 5 mm x 4 mm x 4 mm irregularly shaped, parallel, hypoechoic mass with angular margins with no posterior features seen in the left breast at 2 o'clock, 3 cm from the nipple  Ultrasound-guided core biopsy recommended  3) MASS  Mammo diagnostic left w 3d & cad: There are no corresponding masses seen on this modality  US breast left limited (diagnostic): There is a 3 mm x 2 mm x 3 mm round, parallel, hypoechoic mass with circumscribed margins with no posterior features seen in the left breast at 2 o'clock, 5 cm from the nipple  This is in a segmental distribution with respect to the masses labeled 1 and 2 above concerning for multifocal neoplasia  Evaluation of the axilla confirms no pathologic lymphadenopathy  IMPRESSION:  Several suspicious masses at the 02:00 o'clock location left breast in a segmental distribution  Two site ultrasound-guided biopsy of the  largest nodules labeled #1 and #2  recommended  Lesion #1 is concordant with the mammographic finding  ASSESSMENT/BI-RADS CATEGORY:  Left: 4B - Moderate Suspicion for Malignancy  Overall: 4 - Suspicious     RECOMMENDATION: Ultrasound-guided breast biopsy for the left breast       7/12/21   A  Breast, left at 02:00 o'clock 7 cm from nipple, ultrasound-guided biopsy (3 passes):  -  Invasive breast carcinoma of no special type (ductal NST/invasive ductal carcinoma)    - grade 1  - ER 95%, NE 0%, HER2 2+, equivocal by IHC but negative on the fish analysis    B  Breast, left at 02:00 o'clock 3 cm from nipple, ultrasound-guided biopsy (2 passes):  -  Benign breast parenchyma with fibrocystic changes including apocrine metaplasia, usual ductal hyperplasia, bold all and microcyst formation with a prominent sclerosed and cystic nodule, cannot exclude old granuloma versus sclerosed cyst   -  Negative for dysplasia or carcinoma  8/26/21 Scheduled for breast surgery        Oncology History   Malignant neoplasm of upper-outer quadrant of left breast in female, estrogen receptor positive ([facility] [address] )   7/12/2021 Initial Diagnosis    Malignant neoplasm of upper-outer quadrant of left breast in female, estrogen receptor positive ([facility] [address] )     7/12/2021 Biopsy    A  Breast, left at 02:00 o'clock 7 cm from nipple, ultrasound-guided biopsy (3 passes):  -  Invasive breast carcinoma of no special type (ductal NST/invasive ductal carcinoma)  - grade 1  - ER 95%, TX 0%, HER2 2+, equivocal by IHC but negative on the fish analysis    B  Breast, left at 02:00 o'clock 3 cm from nipple, ultrasound-guided biopsy (2 passes):  -  Benign breast parenchyma with fibrocystic changes including apocrine metaplasia, usual ductal hyperplasia, bold all and microcyst formation with a prominent sclerosed and cystic nodule, cannot exclude old granuloma versus sclerosed cyst   -  Negative for dysplasia or carcinoma        8/5/2021 -  Cancer Staged    Staging form: Breast, AJCC 8th Edition  - Clinical: Stage IA (cT1a, cN0, cM0, G1, ER+, TX-, HER2-) - Signed by Sarah Bee MD on 8/5/2021  Stage prefix: Initial diagnosis  Method of lymph node assessment: Clinical  Histologic grading system: 3 grade system           Clinical Trial: no    [unfilled]    [employer] Maintenance   Topic Date Due    Hepatitis C Screening  Never done    Medicare Annual Wellness Visit (AWV)  Never done    Depression Screening PHQ  Never done    COVID-19 Vaccine (1) Never done    BMI: Followup Plan  Never done    DTaP,Tdap,and Td Vaccines (1 - Tdap) Never done    Colorectal Cancer Screening  Never done    Falls: Plan of Care  Never done    Pneumococcal Vaccine: 65+ Years (1 of 1 - PPSV23) Never done    PT PLAN OF CARE  10/10/2020    Influenza Vaccine (1) 09/01/2021    Fall Risk  09/10/2021    Breast Cancer Screening: Mammogram  07/06/2022    BMI: Adult  08/05/2022    HIB Vaccine  Aged Out    Hepatitis B Vaccine  Aged Out    IPV Vaccine  Aged Out    Hepatitis A Vaccine  Aged Out    Meningococcal ACWY Vaccine  Aged Out    HPV Vaccine  Aged Out       Past Medical History:   Diagnosis Date    Acute URI     Arthritis     Atypical chest pain     Fibrocystic breast disease     Osteoporosis     Palpitations     Psoriasis     Rib pain on left side     Viral conjunctivitis        Past Surgical History:   Procedure Laterality Date    FOOT SURGERY      US GUIDANCE BREAST BIOPSY LEFT EACH ADDITIONAL Left 7/12/2021    US GUIDED BREAST BIOPSY LEFT COMPLETE Left 7/12/2021       Family History   Problem Relation Age of Onset    Skin cancer Father         age dx unk     Lung cancer Maternal Grandfather         age dx unk     No Known Problems Mother     No Known Problems Daughter     No Known Problems Maternal Grandmother     No Known Problems Paternal Grandmother     No Known Problems Paternal Grandfather     No Known Problems Maternal Aunt     No Known Problems Maternal Aunt     No Known Problems Maternal Aunt     No Known Problems Cousin     No Known Problems Cousin     No Known Problems Paternal Aunt     No Known Problems Cousin     No Known Problems Cousin        Social History     Tobacco Use    Smoking status: Never Smoker    Smokeless tobacco: Never Used   Vaping Use    Vaping Use: Never used   Substance Use Topics    Alcohol use: Yes     Comment: social     Drug use: Not Currently          Current Outpatient Medications:     atorvastatin (LIPITOR) 20 mg tablet, , Disp: , Rfl:     PARoxetine (PAXIL) 20 mg tablet, , Disp: , Rfl:     traMADol (ULTRAM) 50 mg tablet, Take 1 tablet (50 mg total) by mouth every 8 (eight) hours as needed for moderate pain, Disp: 9 tablet, Rfl: 0    No Known Allergies     Review of Systems:  Review of Systems   Constitutional: Negative  Negative for activity change, appetite change, fatigue, fever and unexpected weight change  HENT: Positive for congestion  Eyes: Negative  Wears glasses   Respiratory: Negative  Cardiovascular: Negative  Gastrointestinal: Positive for constipation (chronic)  Endocrine: Negative  Genitourinary: Negative  Musculoskeletal: Positive for arthralgias (intermittent hand/wrist; stiffness in feet at times)  Negative for gait problem  Skin: Negative  Allergic/Immunologic: Positive for environmental allergies (seasonal)  Negative for food allergies  Neurological: Negative  Hematological: Negative  Psychiatric/Behavioral: Negative for dysphoric mood  The patient is nervous/anxious  Vitals:    21 0931   Height: 5' 4" (1 626 m)       OB/GYN History:  The patient underwent menarche at 14 years  No LMP recorded  Patient is postmenopausal   Menopause at 47 years  Menopause Reason: natural  Hormone replacement therapy: no   2   Para 2   Age at first delivery being 20 years  Nursing: yes  Birth control pills: yes  Years used: short period of time in her teenage years  Pregnancy test needed:  no    ONCOTYPE/MAMMOPRINT results: no    Imaging:No images are attached to the encounter       Teaching: NCI RT packet given, RT to breast      MST: completed     Implantable Devices (Port, Pacemaker, pain stimulator): no    Hip Replacement: no

## 2021-08-13 NOTE — PROGRESS NOTES
Consultation - Radiation Oncology      UTN:[id number] : 1955  Encounter: [id number]  Patient Information: Medardo Dowd      CHIEF COMPLAINT  Chief Complaint   Patient presents with    Consult     radiation oncology     Cancer Staging  Malignant neoplasm of upper-outer quadrant of left breast in female, estrogen receptor positive ([facility] [address] )  Staging form: Breast, AJCC 8th Edition  - Clinical: Stage IA (cT1a, cN0, cM0, G1, ER+, CA-, HER2-) - Signed by Yamil Ríos MD on 2021  Stage prefix: Initial diagnosis  Method of lymph node assessment: Clinical  Histologic grading system: 3 grade system           History of Present Illness   Patient is seen today for IORT consult for newly diagnosed left breast cancer  She has been referred by Dr Real Goldstein  Patient is accompanied by her spouse, Paola Gee for today's visit       Medardo Dowd is a 76year old female with history of fibrocystic changes of the breast, she has been followed by Dr Real Goldstein for benign breast disease  She now presents with abnormal screening mammogram performed on 21  This was her first 3d mammogram  Focal asymmetry was noted in the upper outer quadrant of the left breast  This led to diagnostic imaging and subsequent biopsy  21 left breast biopsy revealed invasive breast carcinoma, grade 1, no LVI, ER positive, CA negative, HER2 by FISH negative disease       No family history of breast cancer      She met with Dr Real Goldstein on 21, her recommendation is for breast conservation therapy  She has referred the patient to radiation therapy for consideration of IORT (intra-operative radiation therapy)       21 Mammo screening bilateral w 3d & cad  FINDINGS:   LEFT  1) FOCAL ASYMMETRY: There is a 9 mm focal asymmetry seen in the upper outer quadrant of the left breast in the posterior depth, 7 4 cm from the nipple on the MLO and CC views   Associated features include architectural distortion       Right  There are no suspicious masses, grouped microcalcifications or areas of architectural distortion  The skin and nipple areolar complex are unremarkable     Some benign calcifications are present   Stable small nodule medially      IMPRESSION:  Additional imaging required      7/6/21 Mammo diagnostic left w 3d & cad; US breast left limited (diagnostic)  FINDINGS:   LEFT  1) FOCAL ASYMMETRY  Mammo diagnostic left w 3d & cad: There is a 9 mm focal asymmetry seen in the upper outer quadrant of the left breast in the posterior depth, 7 4 cm from the nipple  Associated features include architectural distortion which is more conspicuous on straight lateral tomosynthesis  US breast left limited (diagnostic): There is a 5 mm x 4 mm x 5 mm irregularly shaped, non-parallel, heterogeneous mass with indistinct margins with shadowing seen in the upper outer quadrant of the left breast in the posterior depth, 7 cm from the nipple  The mass correlates with the prior mammogram finding   Ultrasound-guided core biopsy recommended  2) MASS  Mammo diagnostic left w 3d & cad: There are no corresponding masses seen on this modality  US breast left limited (diagnostic): There is a 5 mm x 4 mm x 4 mm irregularly shaped, parallel, hypoechoic mass with angular margins with no posterior features seen in the left breast at 2 o'clock, 3 cm from the nipple   Ultrasound-guided core biopsy recommended  3) MASS  Mammo diagnostic left w 3d & cad: There are no corresponding masses seen on this modality  US breast left limited (diagnostic): There is a 3 mm x 2 mm x 3 mm round, parallel, hypoechoic mass with circumscribed margins with no posterior features seen in the left breast at 2 o'clock, 5 cm from the nipple   This is in a segmental distribution with respect to the masses labeled 1 and 2 above concerning for multifocal neoplasia        Evaluation of the axilla confirms no pathologic lymphadenopathy      IMPRESSION:  Several suspicious masses at the 02:00 o'clock location left breast in a segmental distribution   Two site ultrasound-guided biopsy of the  largest nodules labeled #1 and #2  recommended  Mary Alice Talavera #1 is concordant with the mammographic finding         ASSESSMENT/BI-RADS CATEGORY:  Left: 4B - Moderate Suspicion for Malignancy  Overall: 4 - Suspicious     RECOMMENDATION: Ultrasound-guided breast biopsy for the left breast         7/12/21   A  Breast, left at 02:00 o'clock 7 cm from nipple, ultrasound-guided biopsy (3 passes):  -  Invasive breast carcinoma of no special type (ductal NST/invasive ductal carcinoma)  - grade 1  - ER 95%, ND 0%, HER2 2+, equivocal by IHC but negative on the fish analysis     B  Breast, left at 02:00 o'clock 3 cm from nipple, ultrasound-guided biopsy (2 passes):  -  Benign breast parenchyma with fibrocystic changes including apocrine metaplasia, usual ductal hyperplasia, bold all and microcyst formation with a prominent sclerosed and cystic nodule, cannot exclude old granuloma versus sclerosed cyst   -  Negative for dysplasia or carcinoma          8/26/21 Scheduled for breast surgery    Historical Information   Oncology History   Malignant neoplasm of upper-outer quadrant of left breast in female, estrogen receptor positive ([facility] [address] )   7/12/2021 Initial Diagnosis    Malignant neoplasm of upper-outer quadrant of left breast in female, estrogen receptor positive ([facility] [address] )     7/12/2021 Biopsy    A  Breast, left at 02:00 o'clock 7 cm from nipple, ultrasound-guided biopsy (3 passes):  -  Invasive breast carcinoma of no special type (ductal NST/invasive ductal carcinoma)  - grade 1  - ER 95%, ND 0%, HER2 2+, equivocal by IHC but negative on the fish analysis    B   Breast, left at 02:00 o'clock 3 cm from nipple, ultrasound-guided biopsy (2 passes):  -  Benign breast parenchyma with fibrocystic changes including apocrine metaplasia, usual ductal hyperplasia, bold all and microcyst formation with a prominent sclerosed and cystic nodule, cannot exclude old granuloma versus sclerosed cyst   -  Negative for dysplasia or carcinoma        8/5/2021 -  Cancer Staged    Staging form: Breast, AJCC 8th Edition  - Clinical: Stage IA (cT1a, cN0, cM0, G1, ER+, CA-, HER2-) - Signed by Semaj Billings MD on 8/5/2021  Stage prefix: Initial diagnosis  Method of lymph node assessment: Clinical  Histologic grading system: 3 grade system             Past Medical History:   Diagnosis Date    Acute URI     Arthritis     Atypical chest pain     Breast cancer ([address] )     Fibrocystic breast disease     Hypercholesterolemia     Osteopenia     Palpitations     Psoriasis     Rib pain on left side     Viral conjunctivitis      Past Surgical History:   Procedure Laterality Date    BREAST BIOPSY      COLONOSCOPY      FOOT SURGERY Left     cyst removal    TONSILLECTOMY      US GUIDANCE BREAST BIOPSY LEFT EACH ADDITIONAL Left 7/12/2021    US GUIDED BREAST BIOPSY LEFT COMPLETE Left 7/12/2021       Family History   Problem Relation Age of Onset    Skin cancer Father         age dx unk    Asha Day Lung cancer Maternal Grandfather         age dx unk     No Known Problems Mother     No Known Problems Daughter     No Known Problems Maternal Grandmother     No Known Problems Paternal Grandmother     No Known Problems Paternal Grandfather     No Known Problems Maternal Aunt     No Known Problems Maternal Aunt     No Known Problems Maternal Aunt     No Known Problems Cousin     No Known Problems Cousin     No Known Problems Paternal Aunt     No Known Problems Cousin     No Known Problems Cousin        Social History   Social History     Substance and Sexual Activity   Alcohol Use Yes    Comment: seldom, social     Social History     Substance and Sexual Activity   Drug Use Not Currently     Social History     Tobacco Use   Smoking Status Never Smoker   Smokeless Tobacco Never Used         Meds/Allergies     Current Outpatient Medications:     atorvastatin (LIPITOR) 20 mg tablet, 20 mg daily , Disp: , Rfl:     Cholecalciferol (VITAMIN D-3 PO), Take 2,000 Int'l Units by mouth daily, Disp: , Rfl:     PARoxetine (PAXIL) 20 mg tablet, 20 mg daily , Disp: , Rfl:     traMADol (ULTRAM) 50 mg tablet, Take 1 tablet (50 mg total) by mouth every 8 (eight) hours as needed for moderate pain (Patient not taking: Reported on 2021), Disp: 9 tablet, Rfl: 0  No Known Allergies      Review of Systems   Constitutional: Negative  Negative for activity change, appetite change, fatigue, fever and unexpected weight change  HENT: Positive for congestion  Eyes: Negative  Wears glasses   Respiratory: Negative  Cardiovascular: Negative  Gastrointestinal: Positive for constipation (chronic)  Endocrine: Negative  Genitourinary: Negative  Musculoskeletal: Positive for arthralgias (intermittent hand/wrist; stiffness in feet at times)  Negative for gait problem  Skin: Negative  Allergic/Immunologic: Positive for environmental allergies (seasonal)  Negative for food allergies  Neurological: Negative  Hematological: Negative  Psychiatric/Behavioral: Negative for dysphoric mood  The patient is nervous/anxious           Vitals       Vitals:     21 0931   Height: 5' 4" (1 626 m)            OB/GYN History:  The patient underwent menarche at 11 years  No LMP recorded  Patient is postmenopausal   Menopause at 47 years  Menopause Reason: natural  Hormone replacement therapy: no   2   Para 2   Age at first delivery being 20 years  Nursing: yes  Birth control pills: yes  Years used: short period of time in her teenage years  Pregnancy test needed:  no           OBJECTIVE:   /86   Pulse 92   Temp 98 5 °F (36 9 °C) (Temporal)   Resp 18   Ht 5' 4" (1 626 m)   Wt 69 1 kg (152 lb 5 4 oz)   SpO2 98%   BMI 26 15 kg/m²   Pain Assessment:  0  Performance Status: ECOG/Zubrod/WHO: 0 - Asymptomatic    Physical Exam  Constitutional:       General: She is not in acute distress  Appearance: She is well-developed  She is not diaphoretic  HENT:      Head: Normocephalic and atraumatic  Mouth/Throat:      Pharynx: No oropharyngeal exudate  Eyes:      General: No scleral icterus  Conjunctiva/sclera: Conjunctivae normal       Pupils: Pupils are equal, round, and reactive to light  Neck:      Thyroid: No thyromegaly  Trachea: No tracheal deviation  Cardiovascular:      Rate and Rhythm: Normal rate and regular rhythm  Heart sounds: Normal heart sounds  Pulmonary:      Effort: Pulmonary effort is normal  No respiratory distress  Breath sounds: Normal breath sounds  No wheezing or rales  Chest:      Comments: There is no supraclavicular axillary adenopathy palpable she has fiber cystic changes in both breasts  No suspicious lesion palpable no breast or arm edema  Abdominal:      General: Bowel sounds are normal  There is no distension  Palpations: Abdomen is soft  There is no mass  Tenderness: There is no abdominal tenderness  Musculoskeletal:         General: No tenderness  Normal range of motion  Cervical back: Normal range of motion and neck supple  Lymphadenopathy:      Cervical: No cervical adenopathy  Skin:     General: Skin is warm and dry  Coloration: Skin is not pale  Findings: No erythema or rash  Neurological:      Mental Status: She is alert and oriented to person, place, and time  Cranial Nerves: No cranial nerve deficit  Coordination: Coordination normal    Psychiatric:         Behavior: Behavior normal          Thought Content:  Thought content normal          Judgment: Judgment normal             RESULTS  Lab Results    Chemistry        Component Value Date/Time    K 4 1 04/26/2021 0509     04/26/2021 0509    CO2 25 04/26/2021 0509    BUN 14 04/26/2021 0509    CREATININE 0 93 04/26/2021 0509        Component Value Date/Time    CALCIUM 8 3 04/26/2021 0509    ALKPHOS 66 04/26/2021 0509    AST 17 04/26/2021 0509    ALT 33 04/26/2021 0509            Lab Results   Component Value Date    WBC 6 78 04/26/2021    HGB 12 7 04/26/2021    HCT 37 9 04/26/2021    MCV 93 04/26/2021     04/26/2021         Imaging Studies  No results found  Pathology:  Final Diagnosis   A  Breast, left at 02:00 o'clock 7 cm from nipple, ultrasound-guided biopsy (3 passes):  -  Invasive breast carcinoma of no special type (ductal NST/invasive ductal carcinoma)  * [place] grade 1 of 3 (total score: 4 of 9)    -- tubule formation >75%, score 1    -- nuclear grade 2 of 3, score 2    -- mitoses < 3/mm2, (</= 7 mitoses/10HPF), score 1    * Confirmed by tumor cell immunophenotype:    -- negative:   calponin-B, S100  * Invasive carcinoma involves 3 of 3 submitted core biopsies, max  dimension = 6 millimeters  * Estrogen, progesterone & HER2 receptor studies pending, to be described in a separate receptor report  * Ductal carcinoma in situ (DCIS): Not identified  * Lymph-vascular invasion: Not identified  * Microcalcifications: Present  B  Breast, left at 02:00 o'clock 3 cm from nipple, ultrasound-guided biopsy (2 passes):  -  Benign breast parenchyma with fibrocystic changes including apocrine metaplasia, usual ductal hyperplasia, bold all and microcyst formation with a prominent sclerosed and cystic nodule, cannot exclude old granuloma versus sclerosed cyst   -  Negative for dysplasia or carcinoma  -  Immunohistochemical stain performed with appropriate controls for CK5/6 shows mosaic epithelial staining in the hyperplasia, supporting the diagnosis        Note:    1  Intradepartmental consultation concurs   2  Final report is telephonically messaged to [employer] on 7/16/21 at [phone]  ASSESSMENT  1   Malignant neoplasm of upper-outer quadrant of left breast in female, estrogen receptor positive [facility])  Ambulatory referral to Radiation Oncology     Cancer Staging  Malignant neoplasm of upper-outer quadrant of left breast in female, estrogen receptor positive ([facility] [address] )  Staging form: Breast, AJCC 8th Edition  - Clinical: Stage IA (cT1a, cN0, cM0, G1, ER+, MN-, HER2-) - Signed by Veronique Feliciano MD on 8/5/2021  Stage prefix: Initial diagnosis  Method of lymph node assessment: Clinical  Histologic grading system: 3 grade system        PLAN/DISCUSSION  No orders of the defined types were placed in this encounter  Merced Humphries is a 76y o  year old female with  Clinical stage I a left breast carcinoma  Her tumor is ER positive and MN and HER2 negative  She is interested in proceeding with breast conservation surgery  She is here today to discuss adjuvant radiation options  I reviewed with her whole breast versus partial breast technique  We discussed the volume radiated in each modality, side effects and time frame to deliver treatments  After thorough discussion she would like to proceed with IORT  She meets criteria for partial breast radiation  I reviewed with her the IORT procedure  We discussed possible side effects which can include but not limited to fatigue, seroma, infection, skin erythema, fibrosis  She is scheduled for her surgery on 8/26/2021  She is aware that should on final pathology adverse features are detected additional whole breast radiation will be recommended  We discussed in the TARGIT trial 85% of patients did not require any further radiation however 15% of patients did receive additional whole breast radiation  She has signed consent to proceed with IORT  Tess Velez MD  8/13/2021,10:05 AM      Portions of the record may have been created with voice recognition software  Occasional wrong word or "sound a like" substitutions may have occurred due to the inherent limitations of voice recognition software  Read the chart carefully and recognize, using context, where substitutions have occurred

## 2021-08-13 NOTE — LETTER
2021     Racquel Harvey MD  [address]  [address] [facility] [address]     Patient: Renée Lyon   YOB: 1955   Date of Visit: 2021       Dear Dr Noble Montague: Thank you for referring Renée Lyon to me for evaluation  Below are my notes for this consultation  If you have questions, please do not hesitate to call me  I look forward to following your patient along with you  Sincerely,        Montserrat Huffman MD        CC: No Recipients  Montserrat Huffman MD  2021 10:46 AM  Sign when Signing Visit  Consultation - Radiation Oncology      OZV:[id number] : 1955  Encounter: [id number]  Patient Information: [address]  Chief Complaint   Patient presents with    Consult     radiation oncology     Cancer Staging  Malignant neoplasm of upper-outer quadrant of left breast in female, estrogen receptor positive ([facility] [place] [address] )  Staging form: Breast, AJCC 8th Edition  - Clinical: Stage IA (cT1a, cN0, cM0, G1, ER+, ME-, HER2-) - Signed by Racquel Harvey MD on 2021  Stage prefix: Initial diagnosis  Method of lymph node assessment: Clinical  Histologic grading system: 3 grade system           History of Present Illness   Patient is seen today for IORT consult for newly diagnosed left breast cancer  She has been referred by Dr Noble Montague  Patient is accompanied by her spouse, Maria L Tellez for today's visit       Renée Lyon is a 76year old female with history of fibrocystic changes of the breast, she has been followed by Dr Noble Montague for benign breast disease  She now presents with abnormal screening mammogram performed on 21  This was her first 3d mammogram  Focal asymmetry was noted in the upper outer quadrant of the left breast  This led to diagnostic imaging and subsequent biopsy   21 left breast biopsy revealed invasive breast carcinoma, grade 1, no LVI, ER positive, ME negative, HER2 by FISH negative disease       No family history of breast cancer      She met with Dr Raf Carvajal on 8/5/21, her recommendation is for breast conservation therapy  She has referred the patient to radiation therapy for consideration of IORT (intra-operative radiation therapy)       6/18/21 Mammo screening bilateral w 3d & cad  FINDINGS:   LEFT  1) FOCAL ASYMMETRY: There is a 9 mm focal asymmetry seen in the upper outer quadrant of the left breast in the posterior depth, 7 4 cm from the nipple on the MLO and CC views  Associated features include architectural distortion       Right  There are no suspicious masses, grouped microcalcifications or areas of architectural distortion  The skin and nipple areolar complex are unremarkable     Some benign calcifications are present   Stable small nodule medially      IMPRESSION:  Additional imaging required      7/6/21 Mammo diagnostic left w 3d & cad; US breast left limited (diagnostic)  FINDINGS:   LEFT  1) FOCAL ASYMMETRY  Mammo diagnostic left w 3d & cad: There is a 9 mm focal asymmetry seen in the upper outer quadrant of the left breast in the posterior depth, 7 4 cm from the nipple  Associated features include architectural distortion which is more conspicuous on straight lateral tomosynthesis  US breast left limited (diagnostic): There is a 5 mm x 4 mm x 5 mm irregularly shaped, non-parallel, heterogeneous mass with indistinct margins with shadowing seen in the upper outer quadrant of the left breast in the posterior depth, 7 cm from the nipple  The mass correlates with the prior mammogram finding   Ultrasound-guided core biopsy recommended  2) MASS  Mammo diagnostic left w 3d & cad: There are no corresponding masses seen on this modality  US breast left limited (diagnostic): There is a 5 mm x 4 mm x 4 mm irregularly shaped, parallel, hypoechoic mass with angular margins with no posterior features seen in the left breast at 2 o'clock, 3 cm from the nipple   Ultrasound-guided core biopsy recommended    3) MASS  Mammo diagnostic left w 3d & cad: There are no corresponding masses seen on this modality  US breast left limited (diagnostic): There is a 3 mm x 2 mm x 3 mm round, parallel, hypoechoic mass with circumscribed margins with no posterior features seen in the left breast at 2 o'clock, 5 cm from the nipple   This is in a segmental distribution with respect to the masses labeled 1 and 2 above concerning for multifocal neoplasia        Evaluation of the axilla confirms no pathologic lymphadenopathy  IMPRESSION:  Several suspicious masses at the 02:00 o'clock location left breast in a segmental distribution   Two site ultrasound-guided biopsy of the  largest nodules labeled #1 and #2  recommended  Otilia Phi #1 is concordant with the mammographic finding         ASSESSMENT/BI-RADS CATEGORY:  Left: 4B - Moderate Suspicion for Malignancy  Overall: 4 - Suspicious     RECOMMENDATION: Ultrasound-guided breast biopsy for the left breast         7/12/21   A  Breast, left at 02:00 o'clock 7 cm from nipple, ultrasound-guided biopsy (3 passes):  -  Invasive breast carcinoma of no special type (ductal NST/invasive ductal carcinoma)  - grade 1  - ER 95%, [place] 0%, HER2 2+, equivocal by IHC but negative on the fish analysis     B  Breast, left at 02:00 o'clock 3 cm from nipple, ultrasound-guided biopsy (2 passes):  -  Benign breast parenchyma with fibrocystic changes including apocrine metaplasia, usual ductal hyperplasia, bold all and microcyst formation with a prominent sclerosed and cystic nodule, cannot exclude old granuloma versus sclerosed cyst   -  Negative for dysplasia or carcinoma          8/26/21 Scheduled for breast surgery    Historical Information   Oncology History   Malignant neoplasm of upper-outer quadrant of left breast in female, estrogen receptor positive ([facility] [address] )   7/12/2021 Initial Diagnosis    Malignant neoplasm of upper-outer quadrant of left breast in female, estrogen receptor positive ([facility] [address] )     7/12/2021 Biopsy    A   Breast, left at 02:00 o'clock 7 cm from nipple, ultrasound-guided biopsy (3 passes):  -  Invasive breast carcinoma of no special type (ductal NST/invasive ductal carcinoma)  - grade 1  - ER 95%, [place] 0%, HER2 2+, equivocal by IHC but negative on the fish analysis    B  Breast, left at 02:00 o'clock 3 cm from nipple, ultrasound-guided biopsy (2 passes):  -  Benign breast parenchyma with fibrocystic changes including apocrine metaplasia, usual ductal hyperplasia, bold all and microcyst formation with a prominent sclerosed and cystic nodule, cannot exclude old granuloma versus sclerosed cyst   -  Negative for dysplasia or carcinoma        8/5/2021 -  Cancer Staged    Staging form: Breast, AJCC 8th Edition  - Clinical: Stage IA (cT1a, cN0, cM0, G1, ER+, IN-, HER2-) - Signed by Kwabena Peoples MD on 8/5/2021  Stage prefix: Initial diagnosis  Method of lymph node assessment: Clinical  Histologic grading system: 3 grade system             Past Medical History:   Diagnosis Date    Acute URI     Arthritis     Atypical chest pain     Breast cancer ([address] )     Fibrocystic breast disease     Hypercholesterolemia     Osteopenia     Palpitations     Psoriasis     Rib pain on left side     Viral conjunctivitis      Past Surgical History:   Procedure Laterality Date    BREAST BIOPSY      COLONOSCOPY      FOOT SURGERY Left     cyst removal    TONSILLECTOMY      US GUIDANCE BREAST BIOPSY LEFT EACH ADDITIONAL Left 7/12/2021    US GUIDED BREAST BIOPSY LEFT COMPLETE Left 7/12/2021       Family History   Problem Relation Age of Onset    Skin cancer Father         age dx unk     Lung cancer Maternal Grandfather         age dx unk     No Known Problems Mother     No Known Problems Daughter     No Known Problems Maternal Grandmother     No Known Problems Paternal Grandmother     No Known Problems Paternal Grandfather     No Known Problems Maternal Aunt     No Known Problems Maternal Aunt     No Known Problems Maternal Aunt     No Known Problems Cousin     No Known Problems Cousin     No Known Problems Paternal Aunt     No Known Problems Cousin     No Known Problems Cousin        Social History   Social History     Substance and Sexual Activity   Alcohol Use Yes    Comment: seldom, social     Social History     Substance and Sexual Activity   Drug Use Not Currently     Social History     Tobacco Use   Smoking Status Never Smoker   Smokeless Tobacco Never Used         Meds/Allergies     Current Outpatient Medications:     atorvastatin (LIPITOR) 20 mg tablet, 20 mg daily , Disp: , Rfl:     Cholecalciferol (VITAMIN D-3 PO), Take 2,000 Int'l Units by mouth daily, Disp: , Rfl:     PARoxetine (PAXIL) 20 mg tablet, 20 mg daily , Disp: , Rfl:     traMADol (ULTRAM) 50 mg tablet, Take 1 tablet (50 mg total) by mouth every 8 (eight) hours as needed for moderate pain (Patient not taking: Reported on 8/13/2021), Disp: 9 tablet, Rfl: 0  No Known Allergies      Review of Systems   Constitutional: Negative  Negative for activity change, appetite change, fatigue, fever and unexpected weight change  HENT: Positive for congestion  Eyes: Negative  Wears glasses   Respiratory: Negative  Cardiovascular: Negative  Gastrointestinal: Positive for constipation (chronic)  Endocrine: Negative  Genitourinary: Negative  Musculoskeletal: Positive for arthralgias (intermittent hand/wrist; stiffness in feet at times)  Negative for gait problem  Skin: Negative  Allergic/Immunologic: Positive for environmental allergies (seasonal)  Negative for food allergies  Neurological: Negative  Hematological: Negative  Psychiatric/Behavioral: Negative for dysphoric mood  The patient is nervous/anxious           Vitals       Vitals:     08/13/21 0931   Height: 5' 4" (1 626 m)            OB/GYN History:  The patient underwent menarche at 11 years  No LMP recorded  Patient is postmenopausal   Menopause at 47 years    Menopause Reason: natural  Hormone replacement therapy: no   2   Para 2   Age at first delivery being 20 years  Nursing: yes  Birth control pills: yes  Years used: short period of time in her teenage years  Pregnancy test needed:  no           OBJECTIVE:   /86   Pulse 92   Temp 98 5 °F (36 9 °C) (Temporal)   Resp 18   Ht 5' 4" (1 626 m)   Wt 69 1 kg (152 lb 5 4 oz)   SpO2 98%   BMI 26 15 kg/m²   Pain Assessment:  0  Performance Status: ECOG/Zubrod/WHO: 0 - Asymptomatic    Physical Exam  Constitutional:       General: She is not in acute distress  Appearance: She is well-developed  She is not diaphoretic  HENT:      Head: Normocephalic and atraumatic  Mouth/Throat:      Pharynx: No oropharyngeal exudate  Eyes:      General: No scleral icterus  Conjunctiva/sclera: Conjunctivae normal       Pupils: Pupils are equal, round, and reactive to light  Neck:      Thyroid: No thyromegaly  Trachea: No tracheal deviation  Cardiovascular:      Rate and Rhythm: Normal rate and regular rhythm  Heart sounds: Normal heart sounds  Pulmonary:      Effort: Pulmonary effort is normal  No respiratory distress  Breath sounds: Normal breath sounds  No wheezing or rales  Chest:      Comments: There is no supraclavicular axillary adenopathy palpable she has fiber cystic changes in both breasts  No suspicious lesion palpable no breast or arm edema  Abdominal:      General: Bowel sounds are normal  There is no distension  Palpations: Abdomen is soft  There is no mass  Tenderness: There is no abdominal tenderness  Musculoskeletal:         General: No tenderness  Normal range of motion  Cervical back: Normal range of motion and neck supple  Lymphadenopathy:      Cervical: No cervical adenopathy  Skin:     General: Skin is warm and dry  Coloration: Skin is not pale  Findings: No erythema or rash     Neurological:      Mental Status: She is alert and oriented to person, place, and time  Cranial Nerves: No cranial nerve deficit  Coordination: Coordination normal    Psychiatric:         Behavior: Behavior normal          Thought Content: Thought content normal          Judgment: Judgment normal             RESULTS  Lab Results    Chemistry        Component Value Date/Time    K 4 1 04/26/2021 0509     04/26/2021 0509    CO2 25 04/26/2021 0509    BUN 14 04/26/2021 0509    CREATININE 0 93 04/26/2021 0509        Component Value Date/Time    CALCIUM 8 3 04/26/2021 0509    ALKPHOS 66 04/26/2021 0509    AST 17 04/26/2021 0509    ALT 33 04/26/2021 0509            Lab Results   Component Value Date    WBC 6 78 04/26/2021    HGB 12 7 04/26/2021    HCT 37 9 04/26/2021    MCV 93 04/26/2021     04/26/2021         Imaging Studies  No results found  Pathology:  Final Diagnosis   A  Breast, left at 02:00 o'clock 7 cm from nipple, ultrasound-guided biopsy (3 passes):  -  Invasive breast carcinoma of no special type (ductal NST/invasive ductal carcinoma)  * Emelia grade 1 of 3 (total score: 4 of 9)    -- tubule formation >75%, score 1    -- nuclear grade 2 of 3, score 2    -- mitoses < 3/mm2, (</= 7 mitoses/10HPF), score 1    * Confirmed by tumor cell immunophenotype:    -- negative:   calponin-B, S100  * Invasive carcinoma involves 3 of 3 submitted core biopsies, max  dimension = 6 millimeters  * Estrogen, progesterone & HER2 receptor studies pending, to be described in a separate receptor report  * Ductal carcinoma in situ (DCIS): Not identified  * Lymph-vascular invasion: Not identified  * Microcalcifications: Present       B  Breast, left at 02:00 o'clock 3 cm from nipple, ultrasound-guided biopsy (2 passes):  -  Benign breast parenchyma with fibrocystic changes including apocrine metaplasia, usual ductal hyperplasia, bold all and microcyst formation with a prominent sclerosed and cystic nodule, cannot exclude old granuloma versus sclerosed cyst   - Negative for dysplasia or carcinoma  -  Immunohistochemical stain performed with appropriate controls for CK5/6 shows mosaic epithelial staining in the hyperplasia, supporting the diagnosis        Note:    1  Intradepartmental consultation concurs   2  Final report is telephonically messaged to [employer] on 7/16/21 at [phone]  ASSESSMENT  1  Malignant neoplasm of upper-outer quadrant of left breast in female, estrogen receptor positive ([facility] [address] )  Ambulatory referral to Radiation Oncology     Cancer Staging  Malignant neoplasm of upper-outer quadrant of left breast in female, estrogen receptor positive ([facility] [address] )  Staging form: Breast, AJCC 8th Edition  - Clinical: Stage IA (cT1a, cN0, cM0, G1, ER+, NE-, HER2-) - Signed by Jacinda Drake MD on 8/5/2021  Stage prefix: Initial diagnosis  Method of lymph node assessment: Clinical  Histologic grading system: 3 grade system        PLAN/DISCUSSION  No orders of the defined types were placed in this encounter  Kris Aceves is a 76y o  year old female with  Clinical stage I a left breast carcinoma  Her tumor is ER positive and NE and HER2 negative  She is interested in proceeding with breast conservation surgery  She is here today to discuss adjuvant radiation options  I reviewed with her whole breast versus partial breast technique  We discussed the volume radiated in each modality, side effects and time frame to deliver treatments  After thorough discussion she would like to proceed with IORT  She meets criteria for partial breast radiation  I reviewed with her the IORT procedure  We discussed possible side effects which can include but not limited to fatigue, seroma, infection, skin erythema, fibrosis  She is scheduled for her surgery on 8/26/2021  She is aware that should on final pathology adverse features are detected additional whole breast radiation will be recommended     We discussed in the TARGIT trial 85% of patients did not require any further radiation however 15% of patients did receive additional whole breast radiation  She has signed consent to proceed with IORT  Michelle Murphy MD  8/13/2021,10:05 AM      Portions of the record may have been created with voice recognition software  Occasional wrong word or "sound a like" substitutions may have occurred due to the inherent limitations of voice recognition software  Read the chart carefully and recognize, using context, where substitutions have occurred

## 2021-08-14 ENCOUNTER — APPOINTMENT (OUTPATIENT)
Dept: LAB | Facility: MEDICAL CENTER | Age: 66
End: 2021-08-14
Payer: MEDICARE

## 2021-08-14 DIAGNOSIS — C50.412 MALIGNANT NEOPLASM OF UPPER-OUTER QUADRANT OF LEFT BREAST IN FEMALE, ESTROGEN RECEPTOR POSITIVE (HCC): ICD-10-CM

## 2021-08-14 DIAGNOSIS — Z17.0 MALIGNANT NEOPLASM OF UPPER-OUTER QUADRANT OF LEFT BREAST IN FEMALE, ESTROGEN RECEPTOR POSITIVE (HCC): ICD-10-CM

## 2021-08-14 LAB
ALBUMIN SERPL BCP-MCNC: 3.9 G/DL (ref 3.5–5)
ALP SERPL-CCNC: 77 U/L (ref 46–116)
ALT SERPL W P-5'-P-CCNC: 44 U/L (ref 12–78)
ANION GAP SERPL CALCULATED.3IONS-SCNC: 4 MMOL/L (ref 4–13)
AST SERPL W P-5'-P-CCNC: 26 U/L (ref 5–45)
BASOPHILS # BLD AUTO: 0.03 THOUSANDS/ΜL (ref 0–0.1)
BASOPHILS NFR BLD AUTO: 1 % (ref 0–1)
BILIRUB SERPL-MCNC: 0.56 MG/DL (ref 0.2–1)
BILIRUB UR QL STRIP: NEGATIVE
BUN SERPL-MCNC: 12 MG/DL (ref 5–25)
CALCIUM SERPL-MCNC: 8.8 MG/DL (ref 8.3–10.1)
CHLORIDE SERPL-SCNC: 108 MMOL/L (ref 100–108)
CLARITY UR: CLEAR
CO2 SERPL-SCNC: 25 MMOL/L (ref 21–32)
COLOR UR: YELLOW
CREAT SERPL-MCNC: 0.73 MG/DL (ref 0.6–1.3)
EOSINOPHIL # BLD AUTO: 0.14 THOUSAND/ΜL (ref 0–0.61)
EOSINOPHIL NFR BLD AUTO: 3 % (ref 0–6)
ERYTHROCYTE [DISTWIDTH] IN BLOOD BY AUTOMATED COUNT: 13.2 % (ref 11.6–15.1)
GFR SERPL CREATININE-BSD FRML MDRD: 86 ML/MIN/1.73SQ M
GLUCOSE P FAST SERPL-MCNC: 90 MG/DL (ref 65–99)
GLUCOSE UR STRIP-MCNC: NEGATIVE MG/DL
HCT VFR BLD AUTO: 40 % (ref 34.8–46.1)
HGB BLD-MCNC: 12.9 G/DL (ref 11.5–15.4)
HGB UR QL STRIP.AUTO: NEGATIVE
IMM GRANULOCYTES # BLD AUTO: 0.01 THOUSAND/UL (ref 0–0.2)
IMM GRANULOCYTES NFR BLD AUTO: 0 % (ref 0–2)
KETONES UR STRIP-MCNC: NEGATIVE MG/DL
LEUKOCYTE ESTERASE UR QL STRIP: NEGATIVE
LYMPHOCYTES # BLD AUTO: 1.34 THOUSANDS/ΜL (ref 0.6–4.47)
LYMPHOCYTES NFR BLD AUTO: 26 % (ref 14–44)
MCH RBC QN AUTO: 31.5 PG (ref 26.8–34.3)
MCHC RBC AUTO-ENTMCNC: 32.3 G/DL (ref 31.4–37.4)
MCV RBC AUTO: 98 FL (ref 82–98)
MONOCYTES # BLD AUTO: 0.61 THOUSAND/ΜL (ref 0.17–1.22)
MONOCYTES NFR BLD AUTO: 12 % (ref 4–12)
NEUTROPHILS # BLD AUTO: 2.98 THOUSANDS/ΜL (ref 1.85–7.62)
NEUTS SEG NFR BLD AUTO: 58 % (ref 43–75)
NITRITE UR QL STRIP: NEGATIVE
NRBC BLD AUTO-RTO: 0 /100 WBCS
PH UR STRIP.AUTO: 7 [PH]
PLATELET # BLD AUTO: 225 THOUSANDS/UL (ref 149–390)
PMV BLD AUTO: 11.2 FL (ref 8.9–12.7)
POTASSIUM SERPL-SCNC: 3.8 MMOL/L (ref 3.5–5.3)
PROT SERPL-MCNC: 7.1 G/DL (ref 6.4–8.2)
PROT UR STRIP-MCNC: NEGATIVE MG/DL
RBC # BLD AUTO: 4.1 MILLION/UL (ref 3.81–5.12)
SODIUM SERPL-SCNC: 137 MMOL/L (ref 136–145)
SP GR UR STRIP.AUTO: 1.01 (ref 1–1.03)
UROBILINOGEN UR QL STRIP.AUTO: 0.2 E.U./DL
WBC # BLD AUTO: 5.11 THOUSAND/UL (ref 4.31–10.16)

## 2021-08-14 PROCEDURE — 36415 COLL VENOUS BLD VENIPUNCTURE: CPT

## 2021-08-14 PROCEDURE — 81003 URINALYSIS AUTO W/O SCOPE: CPT | Performed by: SURGERY

## 2021-08-14 PROCEDURE — 85025 COMPLETE CBC W/AUTO DIFF WBC: CPT

## 2021-08-14 PROCEDURE — 80053 COMPREHEN METABOLIC PANEL: CPT

## 2021-08-19 ENCOUNTER — HOSPITAL ENCOUNTER (OUTPATIENT)
Dept: ULTRASOUND IMAGING | Facility: CLINIC | Age: 66
Discharge: HOME | End: 2021-08-19
Payer: MEDICARE

## 2021-08-19 ENCOUNTER — HOSPITAL ENCOUNTER (OUTPATIENT)
Dept: MAMMOGRAPHY | Facility: CLINIC | Age: 66
Discharge: HOME | End: 2021-08-19

## 2021-08-19 VITALS — SYSTOLIC BLOOD PRESSURE: 147 MMHG | HEART RATE: 89 BPM | DIASTOLIC BLOOD PRESSURE: 88 MMHG

## 2021-08-19 DIAGNOSIS — C50.412 MALIGNANT NEOPLASM OF UPPER-OUTER QUADRANT OF LEFT BREAST IN FEMALE, ESTROGEN RECEPTOR POSITIVE (HCC): ICD-10-CM

## 2021-08-19 DIAGNOSIS — R92.8 ABNORMAL MAMMOGRAM: ICD-10-CM

## 2021-08-19 DIAGNOSIS — Z17.0 MALIGNANT NEOPLASM OF UPPER-OUTER QUADRANT OF LEFT BREAST IN FEMALE, ESTROGEN RECEPTOR POSITIVE (HCC): ICD-10-CM

## 2021-08-19 PROCEDURE — 19285 PERQ DEV BREAST 1ST US IMAG: CPT

## 2021-08-19 PROCEDURE — A4648 IMPLANTABLE TISSUE MARKER: HCPCS

## 2021-08-19 RX ORDER — LIDOCAINE HYDROCHLORIDE 10 MG/ML
5 INJECTION, SOLUTION EPIDURAL; INFILTRATION; INTRACAUDAL; PERINEURAL ONCE
Status: COMPLETED | OUTPATIENT
Start: 2021-08-19 | End: 2021-08-19

## 2021-08-19 RX ADMIN — LIDOCAINE HYDROCHLORIDE 5 ML: 10 INJECTION, SOLUTION EPIDURAL; INFILTRATION; INTRACAUDAL; PERINEURAL at 14:25

## 2021-08-19 NOTE — PROGRESS NOTES
Procedure type:    ___x__ultrasound guided _____stereotactic    Breast:    __x___Left _____Right    Location: 2 o'clock 7 cmfn    Needle: N/A    # of passes: N/A    Clip: Saviscout/reflector     Performed by: Dr Charla Leavitt held for 5 minutes by: Jakub Hull     Stermello Strips:    ___x__yes _____no    Band aid:    __x___yes_____no    Tape and guaze:    _____yes __x___no    Tolerated procedure:    ___x__yes _____no

## 2021-08-19 NOTE — PROGRESS NOTES
Ice pack given:    __x___yes _____no    Discharge instructions signed by patient:    ___x__yes _____no    Discharge instructions given to patient:    ___x__yes _____no    Discharged via:    ___x__ambulatory    _____wheelchair    _____stretcher    Stable on discharge:    ___x__yes ____no  Patient is scheduled for surgery on 8/26/21

## 2021-08-19 NOTE — DISCHARGE INSTR - OTHER ORDERS
POST LARGE CORE BREAST BIOPSY PATIENT INFORMATION      1  Place an ice pack inside your bra over the top of the dressing every hour for 20 minutes (20 minutes on, 60 minutes off)  Do this until bedtime  2  Do not shower or bathe until the following morning  3  You may bathe your breast carefully with the steri-strips in place  Be careful    Not to loosen them  The steri-strips will fall off in 3-5 days  4  You may have mild discomfort, and you may have some bruising where the   Needle entered the skin  This should clear within 5-7 days  5  If you need medicine for discomfort, take acetaminophen products such as   Tylenol  You may also take Advil or Motrin products  6  Do not participate in strenuous activities such as-tennis, aerobics, skiing,  Weight lifting, etc  for 24 hours  Refrain from swimming/soaking for 72 hours  7  Wearing a bra for sleeping may be more comfortable for the first 24-48 hours  8  Watch for continued bleeding, pain or fever over 101; please call with any questions or concerns  For procedures done at the [facility]  [address] call:  Sean Mitchell RN at [phone]  Miguel Crawford RN at [phone]                    *After 4 PM call the Interventional Radiology Department                    [phone] and ask to speak with the nurse on call  For procedures done at the [address] [place] [address] call:         Gricel Whitlock RN at   *After 4 PM call the Interventional Radiology Department   [phone] and ask to speak with the nurse on call  For procedures done at [place] call: The Radiology Nurse at [phone]  *After 4 PM call your physician, or go to the Emergency Department  9          The final results of your biopsy are usually available within one week

## 2021-08-20 NOTE — PROGRESS NOTES
Post procedure call completed    Bleeding: _____yes __X___no (pt denies)    Pain: _____yes ___X___no (pt denies)    Redness/Swelling: ______yes ___X___no (pt denies)    Band aid removed: __X___yes _____no    Pt with no questions at this time, adv to call with any questions or concerns, has name/# for contact

## 2021-08-23 NOTE — PRE-PROCEDURE INSTRUCTIONS
Pre-Surgery Instructions:   Medication Instructions    atorvastatin (LIPITOR) 20 mg tablet continue as prescribed excluding DOS    Cholecalciferol (VITAMIN D-3 PO) Avoid 1 week prior to surgery     PARoxetine (PAXIL) 20 mg tablet Continue to take as prescribed including DOS with a small sip of water     Patient instructed to avoid ASPIRIN, OTC vitamins and NSAIDS prior to surgery  Tylenol okay PRN  Patient instructed to continue scheduled medications excluding DOS  Patient given NPO instructions  Patient given CHG bathing instruction per protocol  Patient given up to date visitor guidelines  Patient instructed to have a ride home after surgery  Patient instructed to remove jewelry and not to bring valuables DOS  Patient verbalized understanding of all instructions

## 2021-08-25 ENCOUNTER — ANESTHESIA EVENT (OUTPATIENT)
Dept: PERIOP | Facility: HOSPITAL | Age: 66
End: 2021-08-25
Payer: MEDICARE

## 2021-08-26 ENCOUNTER — APPOINTMENT (OUTPATIENT)
Dept: MAMMOGRAPHY | Facility: HOSPITAL | Age: 66
End: 2021-08-26
Payer: MEDICARE

## 2021-08-26 ENCOUNTER — APPOINTMENT (OUTPATIENT)
Dept: RADIATION ONCOLOGY | Facility: HOSPITAL | Age: 66
End: 2021-08-26
Attending: RADIOLOGY
Payer: MEDICARE

## 2021-08-26 ENCOUNTER — HOSPITAL ENCOUNTER (OUTPATIENT)
Facility: HOSPITAL | Age: 66
Setting detail: OUTPATIENT SURGERY
Discharge: HOME | End: 2021-08-26
Attending: SURGERY | Admitting: SURGERY
Payer: MEDICARE

## 2021-08-26 ENCOUNTER — HOSPITAL ENCOUNTER (OUTPATIENT)
Dept: NUCLEAR MEDICINE | Facility: HOSPITAL | Age: 66
Discharge: HOME | End: 2021-08-26
Attending: SURGERY
Payer: MEDICARE

## 2021-08-26 ENCOUNTER — ANESTHESIA (OUTPATIENT)
Dept: PERIOP | Facility: HOSPITAL | Age: 66
End: 2021-08-26
Payer: MEDICARE

## 2021-08-26 VITALS
DIASTOLIC BLOOD PRESSURE: 69 MMHG | TEMPERATURE: 98 F | WEIGHT: 152 LBS | BODY MASS INDEX: 25.95 KG/M2 | HEART RATE: 82 BPM | HEIGHT: 64 IN | OXYGEN SATURATION: 98 % | SYSTOLIC BLOOD PRESSURE: 144 MMHG | RESPIRATION RATE: 18 BRPM

## 2021-08-26 DIAGNOSIS — C50.412 MALIGNANT NEOPLASM OF UPPER-OUTER QUADRANT OF LEFT BREAST IN FEMALE, ESTROGEN RECEPTOR POSITIVE (HCC): ICD-10-CM

## 2021-08-26 DIAGNOSIS — Z17.0 MALIGNANT NEOPLASM OF UPPER-OUTER QUADRANT OF LEFT BREAST IN FEMALE, ESTROGEN RECEPTOR POSITIVE (HCC): ICD-10-CM

## 2021-08-26 PROBLEM — M19.90 ARTHRITIS: Status: ACTIVE | Noted: 2021-08-26

## 2021-08-26 PROBLEM — F41.9 ANXIETY: Status: ACTIVE | Noted: 2021-08-26

## 2021-08-26 PROBLEM — E78.5 HYPERLIPIDEMIA: Status: ACTIVE | Noted: 2021-08-26

## 2021-08-26 PROCEDURE — 88331 PATH CONSLTJ SURG 1 BLK 1SPC: CPT | Performed by: PATHOLOGY

## 2021-08-26 PROCEDURE — 38900 IO MAP OF SENT LYMPH NODE: CPT | Performed by: SURGERY

## 2021-08-26 PROCEDURE — 88342 IMHCHEM/IMCYTCHM 1ST ANTB: CPT | Performed by: PATHOLOGY

## 2021-08-26 PROCEDURE — 88341 IMHCHEM/IMCYTCHM EA ADD ANTB: CPT | Performed by: PATHOLOGY

## 2021-08-26 PROCEDURE — 88307 TISSUE EXAM BY PATHOLOGIST: CPT | Performed by: PATHOLOGY

## 2021-08-26 PROCEDURE — 77336 RADIATION PHYSICS CONSULT: CPT | Performed by: RADIOLOGY

## 2021-08-26 PROCEDURE — C9726 RXT BREAST APPL PLACE/REMOV: HCPCS | Performed by: RADIOLOGY

## 2021-08-26 PROCEDURE — 77470 SPECIAL RADIATION TREATMENT: CPT | Performed by: RADIOLOGY

## 2021-08-26 PROCEDURE — 38525 BIOPSY/REMOVAL LYMPH NODES: CPT | Performed by: SURGERY

## 2021-08-26 PROCEDURE — A9541 TC99M SULFUR COLLOID: HCPCS

## 2021-08-26 PROCEDURE — 77424 IO RAD TX DELIVERY BY X-RAY: CPT | Performed by: RADIOLOGY

## 2021-08-26 PROCEDURE — 77316 BRACHYTX ISODOSE PLAN SIMPLE: CPT | Performed by: RADIOLOGY

## 2021-08-26 PROCEDURE — G9197 ORDER FOR CEPH: HCPCS | Performed by: SURGERY

## 2021-08-26 PROCEDURE — 19301 PARTIAL MASTECTOMY: CPT | Performed by: SURGERY

## 2021-08-26 PROCEDURE — 77334 RADIATION TREATMENT AID(S): CPT | Performed by: RADIOLOGY

## 2021-08-26 PROCEDURE — 77370 RADIATION PHYSICS CONSULT: CPT | Performed by: RADIOLOGY

## 2021-08-26 PROCEDURE — G1004 CDSM NDSC: HCPCS

## 2021-08-26 PROCEDURE — 78195 LYMPH SYSTEM IMAGING: CPT

## 2021-08-26 RX ORDER — LIDOCAINE HYDROCHLORIDE 10 MG/ML
0.5 INJECTION, SOLUTION EPIDURAL; INFILTRATION; INTRACAUDAL; PERINEURAL ONCE AS NEEDED
Status: DISCONTINUED | OUTPATIENT
Start: 2021-08-26 | End: 2021-08-26 | Stop reason: HOSPADM

## 2021-08-26 RX ORDER — FENTANYL CITRATE 50 UG/ML
INJECTION, SOLUTION INTRAMUSCULAR; INTRAVENOUS AS NEEDED
Status: DISCONTINUED | OUTPATIENT
Start: 2021-08-26 | End: 2021-08-26

## 2021-08-26 RX ORDER — PROPOFOL 10 MG/ML
INJECTION, EMULSION INTRAVENOUS AS NEEDED
Status: DISCONTINUED | OUTPATIENT
Start: 2021-08-26 | End: 2021-08-26

## 2021-08-26 RX ORDER — BUPIVACAINE HYDROCHLORIDE 5 MG/ML
INJECTION, SOLUTION PERINEURAL AS NEEDED
Status: DISCONTINUED | OUTPATIENT
Start: 2021-08-26 | End: 2021-08-26 | Stop reason: HOSPADM

## 2021-08-26 RX ORDER — ACETAMINOPHEN 325 MG/1
650 TABLET ORAL EVERY 6 HOURS PRN
Status: DISCONTINUED | OUTPATIENT
Start: 2021-08-26 | End: 2021-08-26 | Stop reason: HOSPADM

## 2021-08-26 RX ORDER — TRAMADOL HYDROCHLORIDE 50 MG/1
50 TABLET ORAL EVERY 6 HOURS PRN
Status: DISCONTINUED | OUTPATIENT
Start: 2021-08-26 | End: 2021-08-26 | Stop reason: HOSPADM

## 2021-08-26 RX ORDER — CEFAZOLIN SODIUM 1 G/50ML
1000 SOLUTION INTRAVENOUS
Status: DISCONTINUED | OUTPATIENT
Start: 2021-08-26 | End: 2021-08-26 | Stop reason: HOSPADM

## 2021-08-26 RX ORDER — MIDAZOLAM HYDROCHLORIDE 2 MG/2ML
INJECTION, SOLUTION INTRAMUSCULAR; INTRAVENOUS AS NEEDED
Status: DISCONTINUED | OUTPATIENT
Start: 2021-08-26 | End: 2021-08-26

## 2021-08-26 RX ORDER — MEPERIDINE HYDROCHLORIDE 25 MG/ML
25 INJECTION INTRAMUSCULAR; INTRAVENOUS; SUBCUTANEOUS ONCE AS NEEDED
Status: DISCONTINUED | OUTPATIENT
Start: 2021-08-26 | End: 2021-08-26 | Stop reason: HOSPADM

## 2021-08-26 RX ORDER — DEXAMETHASONE SODIUM PHOSPHATE 4 MG/ML
INJECTION, SOLUTION INTRA-ARTICULAR; INTRALESIONAL; INTRAMUSCULAR; INTRAVENOUS; SOFT TISSUE AS NEEDED
Status: DISCONTINUED | OUTPATIENT
Start: 2021-08-26 | End: 2021-08-26

## 2021-08-26 RX ORDER — FENTANYL CITRATE/PF 50 MCG/ML
25 SYRINGE (ML) INJECTION
Status: DISCONTINUED | OUTPATIENT
Start: 2021-08-26 | End: 2021-08-26 | Stop reason: HOSPADM

## 2021-08-26 RX ORDER — MAGNESIUM HYDROXIDE 1200 MG/15ML
LIQUID ORAL AS NEEDED
Status: DISCONTINUED | OUTPATIENT
Start: 2021-08-26 | End: 2021-08-26 | Stop reason: HOSPADM

## 2021-08-26 RX ORDER — ISOSULFAN BLUE 50 MG/5ML
INJECTION, SOLUTION SUBCUTANEOUS AS NEEDED
Status: DISCONTINUED | OUTPATIENT
Start: 2021-08-26 | End: 2021-08-26 | Stop reason: HOSPADM

## 2021-08-26 RX ORDER — KETOROLAC TROMETHAMINE 30 MG/ML
INJECTION, SOLUTION INTRAMUSCULAR; INTRAVENOUS AS NEEDED
Status: DISCONTINUED | OUTPATIENT
Start: 2021-08-26 | End: 2021-08-26

## 2021-08-26 RX ORDER — GLYCOPYRROLATE 0.2 MG/ML
INJECTION INTRAMUSCULAR; INTRAVENOUS AS NEEDED
Status: DISCONTINUED | OUTPATIENT
Start: 2021-08-26 | End: 2021-08-26

## 2021-08-26 RX ORDER — PROPOFOL 10 MG/ML
INJECTION, EMULSION INTRAVENOUS CONTINUOUS PRN
Status: DISCONTINUED | OUTPATIENT
Start: 2021-08-26 | End: 2021-08-26

## 2021-08-26 RX ORDER — SODIUM CHLORIDE, SODIUM LACTATE, POTASSIUM CHLORIDE, CALCIUM CHLORIDE 600; 310; 30; 20 MG/100ML; MG/100ML; MG/100ML; MG/100ML
125 INJECTION, SOLUTION INTRAVENOUS CONTINUOUS
Status: DISCONTINUED | OUTPATIENT
Start: 2021-08-26 | End: 2021-08-26 | Stop reason: HOSPADM

## 2021-08-26 RX ORDER — LIDOCAINE HYDROCHLORIDE 10 MG/ML
INJECTION, SOLUTION EPIDURAL; INFILTRATION; INTRACAUDAL; PERINEURAL AS NEEDED
Status: DISCONTINUED | OUTPATIENT
Start: 2021-08-26 | End: 2021-08-26

## 2021-08-26 RX ORDER — ONDANSETRON 2 MG/ML
INJECTION INTRAMUSCULAR; INTRAVENOUS AS NEEDED
Status: DISCONTINUED | OUTPATIENT
Start: 2021-08-26 | End: 2021-08-26

## 2021-08-26 RX ADMIN — SODIUM CHLORIDE, SODIUM LACTATE, POTASSIUM CHLORIDE, AND CALCIUM CHLORIDE: .6; .31; .03; .02 INJECTION, SOLUTION INTRAVENOUS at 09:14

## 2021-08-26 RX ADMIN — FENTANYL CITRATE 25 MCG: 50 INJECTION, SOLUTION INTRAMUSCULAR; INTRAVENOUS at 10:41

## 2021-08-26 RX ADMIN — LIDOCAINE HYDROCHLORIDE 50 MG: 10 INJECTION, SOLUTION EPIDURAL; INFILTRATION; INTRACAUDAL at 10:35

## 2021-08-26 RX ADMIN — MIDAZOLAM HYDROCHLORIDE 2 MG: 1 INJECTION, SOLUTION INTRAMUSCULAR; INTRAVENOUS at 10:30

## 2021-08-26 RX ADMIN — GLYCOPYRROLATE 0.1 MG: 0.2 INJECTION, SOLUTION INTRAMUSCULAR; INTRAVENOUS at 10:43

## 2021-08-26 RX ADMIN — CEFAZOLIN SODIUM 1000 MG: 1 SOLUTION INTRAVENOUS at 10:30

## 2021-08-26 RX ADMIN — FENTANYL CITRATE 25 MCG: 50 INJECTION, SOLUTION INTRAMUSCULAR; INTRAVENOUS at 12:09

## 2021-08-26 RX ADMIN — ENOXAPARIN SODIUM 30 MG: 30 INJECTION SUBCUTANEOUS at 08:58

## 2021-08-26 RX ADMIN — PROPOFOL 20 MG: 10 INJECTION, EMULSION INTRAVENOUS at 10:55

## 2021-08-26 RX ADMIN — FENTANYL CITRATE 50 MCG: 50 INJECTION, SOLUTION INTRAMUSCULAR; INTRAVENOUS at 10:55

## 2021-08-26 RX ADMIN — DEXAMETHASONE SODIUM PHOSPHATE 4 MG: 4 INJECTION INTRA-ARTICULAR; INTRALESIONAL; INTRAMUSCULAR; INTRAVENOUS; SOFT TISSUE at 10:40

## 2021-08-26 RX ADMIN — PROPOFOL 60 MCG/KG/MIN: 10 INJECTION, EMULSION INTRAVENOUS at 10:38

## 2021-08-26 RX ADMIN — PROPOFOL 180 MG: 10 INJECTION, EMULSION INTRAVENOUS at 10:35

## 2021-08-26 RX ADMIN — ONDANSETRON 4 MG: 2 INJECTION INTRAMUSCULAR; INTRAVENOUS at 10:40

## 2021-08-26 RX ADMIN — KETOROLAC TROMETHAMINE 15 MG: 30 INJECTION, SOLUTION INTRAMUSCULAR; INTRAVENOUS at 12:12

## 2021-08-26 NOTE — OP NOTE
OPERATIVE REPORT  PATIENT NAME: Wilberto Simmons    :  1955  MRN: [id number]  Pt Location: AN OR ROOM 03    SURGERY DATE: 2021    Surgeon(s) and Role:     * Wali Cabrera MD - Primary     * Basia Wild MD - Assisting    Preop Diagnosis:  Malignant neoplasm of upper-outer quadrant of left breast in female, estrogen receptor positive ([facility] [address] ) [C50 412, Z17 0]    Post-Op Diagnosis Codes:     * Malignant neoplasm of upper-outer quadrant of left breast in female, estrogen receptor positive ([facility] [address] ) [C50 412, Z17 0]    Procedure(s) (LRB):  BREAST GRACE LOCALIZED LUMPECTOMY (Left)  BIOPSY LYMPH NODE SENTINEL (NUC MED INJECTION AT 0900) (Left)  BREAST INTRAOPERATIVE RADIATION THERAPY (IORT) BY DR SCANLON (Left)   Injection of blue dye  Use of gamma probe  Use of grace   Use of intraoperative ultrasound  Insertion of radiation probe  Removal of radiation probe      Specimen(s):  ID Type Source Tests Collected by Time Destination   1 : left breast lumpectomy, short stitch superior, long stitch lateral Tissue Breast, Left TISSUE EXAM Wali Cabrera MD 2021 1055    2 : left breast sentinel lymph node Tissue Lymph Node, [place] TISSUE EXAM Wali Cabrera MD 2021 1058    3 : left breast lumpectomy, new margin superior, stitch marks true margin Tissue Breast, Left TISSUE EXAM Wali Cabrera MD 2021 1114    4 : left breast lumpectomy, new margin medial, stitch marks true margin Tissue Breast, Left TISSUE EXAM Wali Cabrera MD 2021 1115        Estimated Blood Loss:   Minimal    Drains:  * No LDAs found *    Anesthesia Type:   General    Operative Indications:  Malignant neoplasm of upper-outer quadrant of left breast in female, estrogen receptor positive ([facility] [address] ) [C50 412, Z17 0]      Operative Findings:  Clip and grace in specimen, touch prep negative on sentinel node    Complications:   None    Procedure and Technique: Musa Suarez is a 79-year-old female presented to my office with a left breast carcinoma  She was counseled on breast conservation  She also met with Radiation Oncology to discuss intraoperative radiation therapy  She had a CHELLY reflector placed for localization purposes  She presented the day of surgery to the radiology suite and underwent lymphoscintigraphy of the left breast   From there she went to the operating room  She was given anesthesia  She had preoperative antibiotics  She had a 5 cc injection Lymphazurin into the left subareolar plexus  She was prepped and draped in the usual standard fashion  Time-out was performed  Attention was turned to the upper outer left breast   The CHELLY reflector was identified in the skin was marked this location  The gamma probe was also used to localize the node in the axilla  The skin was marked at the area of increased radioactivity at the at the lateral pectoralis edge  0 5% Marcaine plain was injected for local anesthesia  Incision was created in the skin crease in the axilla  Electrocautery was used to dissect through the remaining subcutaneous tissue and clavipectoral fascia to enter the axillary fat pad  Deep in the mid axilla there was a blue lymphatic channel that traced to a radioactive lymph node  This was elevated into the wound  This was excised completely and submitted to pathology for touch prep analysis as sentinel node of the left axilla  Following removal of this lymph node, there were no additional radioactive nodes, blue staining or palpable nodes  Attention was then turned to the upper outer left breast   Additional 0 5% Marcaine plain was injected for local anesthesia  Elliptical incision was created in a 2-230 axis through the skin and subcutaneous tissue  Electrocautery was used to dissect margins superior, lateral, inferior, medial and posterior  The CHELLY probe was used throughout to help guide dissection    The specimen was marked with a short stitch superior and a long stitch lateral   The specimen was interrogated with the CHELLY probe to confirm reflector removal   The specimen was also imaged in the operating room revealing the prior biopsy clip as well as CHELLY reflector  The closest margins appeared to be superior and medial   Therefore new margins were excised in these locations and sutures were placed on the true margins  All breast specimens were sent to pathology in formalin  Subsequently there is no evidence of metastatic disease on the touch prep analysis of the lymph node  The lumpectomy cavity was then prepared for her radiation therapy  The base of the cavity was measured at 3 5 cm  If 3 5 cm radiation probe was then prepared sterilely for the field  A pursestring Prolene suture was placed in the subcutaneous tissue  The probe was placed into the base of the cavity and the pursestring suture was used to cinch the tissue around the probe for complete tissue coverage  A moist Ray-Dwight was also placed at the base of the probe  The [place] retractor and hooks x2 were also used to remove the skin from the base of the probe  Intraoperative ultrasound was then used to measure the skin distance from the probe to the skin surface  The shortest distance of was superior at 1 28 cm  Radiation Bozman Karin were applied  Radiation was then delivered for a total of 20 minutes  Following completion of the radiation, the radiation Bora Karin, skin hooks, [place] retractor, Ray-Dwight and Prolene suture were all removed  The radiation probe was then removed the lumpectomy cavity without event  Both of her wounds were irrigated and hemostasis was achieved  The wounds were then closed in a layered fashion using multiple interrupted 3-0 Monocryl suture and a running 4-0 Monocryl subcuticular stitch  All counts were correct  The skin was cleaned and dried  Surgical glue, fluffs and a bra were applied  The patient was then extubated and taken to recovery in stable condition        Patient Disposition:  extubated and stable    SIGNATURE: Darryl Chirinos MD  DATE: August 26, 2021  TIME: 12:20 PM

## 2021-08-26 NOTE — ANESTHESIA PREPROCEDURE EVALUATION
Procedure:  BREAST CHELLY LOCALIZED LUMPECTOMY (Left Breast)  BIOPSY LYMPH NODE SENTINEL (NUC MED INJECTION AT 0900) (Left Axilla)  BREAST INTRAOPERATIVE RADIATION THERAPY (IORT) BY DR SCANLON (Left Breast)    Relevant Problems   ANESTHESIA (within normal limits)      CARDIO   (+) Essential hypertension   (+) Hyperlipidemia      ENDO (within normal limits)      GYN   (+) Malignant neoplasm of upper-outer quadrant of left breast in female, estrogen receptor positive (HCC)      MUSCULOSKELETAL   (+) Arthritis      NEURO/PSYCH   (+) Anxiety   (+) Numbness and tingling of left upper and lower extremity   (+) TIA (transient ischemic attack)      PULMONARY (within normal limits)        Physical Exam    Airway    Mallampati score: II  TM Distance: >3 FB  Neck ROM: full     Dental   No notable dental hx     Cardiovascular      Pulmonary      Other Findings        Anesthesia Plan  ASA Score- 2     Anesthesia Type- general with ASA Monitors  Additional Monitors:   Airway Plan: LMA  Plan Factors-Exercise tolerance (METS): >4 METS  Chart reviewed  Existing labs reviewed  Patient summary reviewed  Patient is not a current smoker  Induction- intravenous  Postoperative Plan-     Informed Consent- Anesthetic plan and risks discussed with patient  I personally reviewed this patient with the CRNA  Discussed and agreed on the Anesthesia Plan with the CRNA  Riley Cox

## 2021-08-26 NOTE — INTERVAL H&P NOTE
H&P reviewed  After examining the patient I find no changes in the patients condition since the H&P had been written      Vitals:    08/26/21 0844   BP: (!) 177/85   Pulse: 94   Resp: 18   Temp: 98 2 °F (36 8 °C)   SpO2: 99%

## 2021-08-26 NOTE — DISCHARGE INSTRUCTIONS
POST-OPERATIVE CARE INSTRUCTIONS       Care after your procedure:   General  · Rest and relax for 24 hours, then gradually return to normal activities  · Do not preform any heavy lifting or strenuous physical activities for 14 days  · Your activity restrictions will be re-evaluated at your post op visit  · Drink clear liquids until you are certain there is no nausea, then resume a normal diet  · Do not drink alcohol, drive any vehicle, operate mechanical equipment or make critical decisions for at least 24 hours and until you are off any narcotic pain medications  The Incision  · Your incision is closed with:   dissolvable stiches just underneath the skin  · The incision is also covered with:                          clear waterproof glue  · A gauze-pad is covering the wound  Wound care  · Remove your gauze-pad after 24 hours  · You may then shower using soap and water to clean your incision  Gently dry the wound  · You may redress your wound with additional gauze and tape if you choose  · A little bruising at the wound site is normal     Medication  · Resume all previous medications  · Take either Naproxen (Aleve) one tablet every 8 hours or Ibuprofen(Advil/Motrin) one(1) to two(2) tablets every 6 hours as needed  · Pain Medication Instructions: may also use over the counter tylenol or prescription tramadol if needed          Other (If applicable)  · Wear a post-surgical bra around the clock  · May use ice to the incision site(s) for the next 24-48 hours, twice daily     Call your  doctor if you have any of the following:  · Redness, swelling, heat, drainage, and/or bleeding from your wound  · Chills or fever ( above 101' F )  · Pain, not relieved with the above medications  · If you have any questions or problems call our office [phone]    Follow-up appointment:  · As scheduled

## 2021-08-26 NOTE — ANESTHESIA POSTPROCEDURE EVALUATION
Post-Op Assessment Note    CV Status:  Stable  Pain Score: 0    Pain management: adequate     Mental Status:  Alert and awake   Hydration Status:  Euvolemic   PONV Controlled:  Controlled   Airway Patency:  Patent      Post Op Vitals Reviewed: Yes      Staff: CRNA         No complications documented      BP  123/63   Temp   98 4   Pulse  84   Resp   14   SpO2   99

## 2021-09-09 ENCOUNTER — OFFICE VISIT (OUTPATIENT)
Dept: SURGICAL ONCOLOGY | Facility: CLINIC | Age: 66
End: 2021-09-09

## 2021-09-09 VITALS
DIASTOLIC BLOOD PRESSURE: 60 MMHG | HEIGHT: 64 IN | SYSTOLIC BLOOD PRESSURE: 120 MMHG | RESPIRATION RATE: 16 BRPM | BODY MASS INDEX: 25.61 KG/M2 | WEIGHT: 150 LBS | HEART RATE: 65 BPM | OXYGEN SATURATION: 97 % | TEMPERATURE: 98.4 F

## 2021-09-09 DIAGNOSIS — Z17.0 MALIGNANT NEOPLASM OF UPPER-OUTER QUADRANT OF LEFT BREAST IN FEMALE, ESTROGEN RECEPTOR POSITIVE (HCC): Primary | ICD-10-CM

## 2021-09-09 DIAGNOSIS — C50.412 MALIGNANT NEOPLASM OF UPPER-OUTER QUADRANT OF LEFT BREAST IN FEMALE, ESTROGEN RECEPTOR POSITIVE (HCC): Primary | ICD-10-CM

## 2021-09-09 PROCEDURE — 99024 POSTOP FOLLOW-UP VISIT: CPT | Performed by: SURGERY

## 2021-09-09 NOTE — PROGRESS NOTES
76 y o  female is here today s/p  Left lumpectomy, sentinel node biopsy and intraoperative radiation therapy  She reports  Paresthesias in the axillary area  Physical Exam  Constitutional:       General: She is not in acute distress  Chest:      Breasts:         Left: Skin change (  Well-healing incision with no signs of infection) present  Neurological:      Mental Status: She is alert and oriented to person, place, and time  Psychiatric:         Mood and Affect: Mood normal          Data:    08/26/2021 left lumpectomy and sentinel node biopsy    Staging:     nine mm  IDC with associated DCIS  Tumor grade  one  Margins   clean  Estrogen receptor and progesterone receptor status   ER Positive at 95 percent,  Progesterone is negative  HER2 status and test method  negative   Lymph node assessment/status  1 millimeter micro met on permanent section      Neoadjuvant therapy:   Not applicable  Stage: IA      Diagnoses and all orders for this visit:    Malignant neoplasm of upper-outer quadrant of left breast in female, estrogen receptor positive ([facility] [address] )  -     Ambulatory referral to Hematology / Oncology; Future        Assessment/Plan:  69-year-old female status post left lumpectomy, sentinel node biopsy and intraoperative radiation therapy for a stage IA invasive ductal carcinoma  She did have a micro met in one lymph node  I discussed these findings with her  She will likely need additional radiation therapy  She has a follow-up appointment with Dr Armando Contreras on 09/23/2021  I am also referring her to Medical Oncology for consultation  I will plan to see her again in six months or sooner should the need arise

## 2021-09-13 ENCOUNTER — TELEPHONE (OUTPATIENT)
Dept: HEMATOLOGY ONCOLOGY | Facility: CLINIC | Age: 66
End: 2021-09-13

## 2021-09-13 NOTE — TELEPHONE ENCOUNTER
Intra-Department Referral    Patient Details:  Dennis Kennedy  1955  [id number]   Background Information:  [address] starts by opening a telephone encounter and gathering the following information   Who is calling to schedule and relationship? Patient   Diagnosis Invasive ductal carcinoma and DCIS   Is this Cancer or Non-Cancer? Cancer   What department was patient seen in last? Surg Onc   Scheduling Information:    [facility]   Are there any days the patient cannot be seen?  9/23 Dr Lit Sewell appt   Miscellaneous:
Patient calling to schedule appt with hem onc  Transferred to [facility] 
decreased strength/pain

## 2021-09-20 ENCOUNTER — CONSULT (OUTPATIENT)
Dept: HEMATOLOGY ONCOLOGY | Facility: CLINIC | Age: 66
End: 2021-09-20
Payer: MEDICARE

## 2021-09-20 VITALS
HEIGHT: 64 IN | OXYGEN SATURATION: 98 % | DIASTOLIC BLOOD PRESSURE: 98 MMHG | HEART RATE: 97 BPM | TEMPERATURE: 98.8 F | RESPIRATION RATE: 18 BRPM | BODY MASS INDEX: 25.95 KG/M2 | SYSTOLIC BLOOD PRESSURE: 142 MMHG | WEIGHT: 152 LBS

## 2021-09-20 DIAGNOSIS — Z17.0 MALIGNANT NEOPLASM OF UPPER-OUTER QUADRANT OF LEFT BREAST IN FEMALE, ESTROGEN RECEPTOR POSITIVE (HCC): ICD-10-CM

## 2021-09-20 DIAGNOSIS — C50.412 MALIGNANT NEOPLASM OF UPPER-OUTER QUADRANT OF LEFT BREAST IN FEMALE, ESTROGEN RECEPTOR POSITIVE (HCC): ICD-10-CM

## 2021-09-20 PROCEDURE — 99205 OFFICE O/P NEW HI 60 MIN: CPT | Performed by: INTERNAL MEDICINE

## 2021-09-20 NOTE — LETTER
September 20, 2021     Chula Mcfarland, [address] [facility] [address] [facility] [address]    Patient: Priyanka Stockton   YOB: 1955   Date of Visit: 9/20/2021       Dear Dr Geoffery Aase:    Thank you for referring Anthony Mandujano to me for evaluation  Below are my notes for this consultation  If you have questions, please do not hesitate to call me  I look forward to following your patient along with you  Sincerely,        Deanne Escamilla MD        CC: MD Fatoumata Paez MD Abagail Otto, MD  9/20/2021  2:47 PM  Sign when Signing Visit  Hematology / Oncology Outpatient Consult Note    Priyanka Stockton 78 y o  female SWX2/36/1945 [id number]         Date:  9/20/2021    Assessment / Plan:    A 48-year-old postmenopausal woman with newly diagnosed stage I B left breast cancer, grade 1, ER 90% positive, OK negative, HER2 fish negative disease  She underwent lumpectomy and sentinel lymph node biopsy, resulting in LEO  She had 1 positive lymph node with micrometastasis  She presents today with her  to discuss adjuvant treatment options  We had extensive discussion regarding the diagnosis, staging information, tumor phenotype, prognosis as well as treatment options  It is clear that she will need adjuvant hormonal therapy with aromatase inhibitor for 5 years  However, indication for adjuvant chemotherapy is not clear based on the clinical characteristics  I think it is reasonable to send her tumor tissue for Oncotype DX testing  Because the low-grade disease, it is more likely that Oncotype DX recurrence score being low in which case I would recommend hormonal therapy alone  In case, Oncotype DX recurrence score is more than 25, I would recommend adjuvant chemotherapy with Taxotere with cyclophosphamide x4 cycle followed by aromatase inhibitor    Side effects of anastrozole was thoroughly discussed, including but not limited to hot flashes, musculoskeletal symptom and bone mineral density loss  I recommended her to take calcium vitamin-D on a regular basis  She is in agreement with my recommendation  Once obtain the Oncotype DX recurrence score, I will contact her and treat her accordingly  I will see her again in 6 months for routine follow-up, assuming that she will have adjuvant hormonal therapy alone  Subjective:     HPI:    A 78-year-old postmenopausal woman who was found to have radiographic abnormality in her right breast based on a screening mammography  Therefore, she underwent right breast biopsy in July 12, 2021, which showed invasive ductal carcinoma, grade 1  This was ER 90% positive, UT negative, HER2 2+ disease  HER2 fish was negative for gene application with ratio of 1 0  She subsequent underwent lumpectomy and sentinel lymph node biopsy by Dr Ward Huffman in August 26, 2021  She had 9 x 8 x 5 mm of invasive ductal carcinoma, grade 1  Lymphovascular invasion was present  1/3 sentinel lymph nodes or had micrometastasis measuring 1 mm  There was no evidence of extranodal extension  She had intraoperative radiation therapy at the time of lumpectomy  She presents today to discuss adjuvant treatment options  She still has some discomfort in her left breast   Otherwise, she feels well  She denied any bone pain  She has no respiratory symptoms  Her weight is stable  Her performance status is normal   She has no family history of breast or ovarian cancer  She is a lifetime never smoker  Interval History:          Objective:     Primary Diagnosis:      Left breast cancer, stage I B ( pT1b, pN1a (mi), M0) grade 1, ER 90% positive, UT negative, HER2 fish negative disease  Diagnosed in August 2021      Cancer Staging:  Cancer Staging  Malignant neoplasm of upper-outer quadrant of left breast in female, estrogen receptor positive ([facility] [address] )  Staging form: Breast, AJCC 8th Edition  - Clinical: Stage IA (cT1a, cN0, cM0, G1, ER+, UT-, HER2-) - Signed by Sarah Bee MD on 8/5/2021  Stage prefix: Initial diagnosis  Method of lymph node assessment: Clinical  Histologic grading system: 3 grade system  - Pathologic: Stage IA (pT1b, pN1mi(sn), cM0, G1, ER+, DC-, HER2-) - Signed by Sarah Bee MD on 9/9/2021  Stage prefix: Initial diagnosis  Method of lymph node assessment: [place] lymph node biopsy  Histologic grading system: 3 grade system        Previous Hematologic/ Oncologic Treatment:         Current Hematologic/ Oncologic Treatment:       to be determined  Disease Status:       LEO status post lumpectomy and sentinel lymph node biopsy  Test Results:    Pathology:      9 x 8 x 5 mm of invasive ductal carcinoma, grade 1  Lymphovascular invasion was present  1/3 sentinel lymph nodes had positive micrometastasis, measuring 1 mm with no extranodal extension  ER 90% positive, DC negative, HER2 2+ disease  HER2 fish was negative for gene application with ratio of 1 0  Stage I B ( pT1b, pN1 ( mi), M0)    Radiology:        Laboratory:     see below  Physical Exam:      General Appearance:    Alert, oriented        Eyes:    PERRL   Ears:    Normal external ear canals, both ears   Nose:   Nares normal, septum midline   Throat:   Mucosa moist  Pharynx without injection  Neck:   Supple       Lungs:     Clear to auscultation bilaterally   Chest Wall:    No tenderness or deformity    Heart:    Regular rate and rhythm       Abdomen:     Soft, non-tender, bowel sounds +, no organomegaly           Extremities:   Extremities no cyanosis or edema       Skin:   no rash or icterus  Lymph nodes:   Cervical, supraclavicular, and axillary nodes normal   Neurologic:   CNII-XII intact, normal strength, sensation and reflexes     Throughout          Breast exam:     Lumpectomy scar at 3:00 a m  position in her left breast with no palpable abnormality  Right breast exam is negative           ROS: Review of Systems   All other systems reviewed and are negative  Imaging: NM lymphatic breast    Result Date: 8/26/2021  Narrative: SENTINEL NODE LYMPHOSCINTIGRAPHY INDICATION: Left breast carcinoma, localize sentinel node FINDINGS: 0 53 mCi Tc-99m sulfur colloid (0 6 cc volume) was administered in divided doses by myself intradermally in the left breast  Scintigraphic images were obtained over the left hemithorax and axilla in multiple projections  A left axillary sentinel node was  identified  Using scintigraphic guidance, the corresponding skin site was marked with an indelible marker  The patient was transferred to the operating room in satisfactory condition  Impression: [place] lymph node localized to the left axilla  Workstation performed: [id number]     Mammo grace  breast specimen (No Charge)    Result Date: 8/27/2021  Narrative: Surgical specimen Two views AP surgical specimen were provided for non contemporaneous review  Biopsy marker clip and GRACE  radiofrequency reflector are noted within the specimen  The findings were not discussed with the surgeon at the time of surgery  Labs:   Lab Results   Component Value Date    WBC 5 11 08/14/2021    HGB 12 9 08/14/2021    HCT 40 0 08/14/2021    MCV 98 08/14/2021     08/14/2021     Lab Results   Component Value Date    K 3 8 08/14/2021     08/14/2021    CO2 25 08/14/2021    BUN 12 08/14/2021    CREATININE 0 73 08/14/2021    GLUF 90 08/14/2021    CALCIUM 8 8 08/14/2021    AST 26 08/14/2021    ALT 44 08/14/2021    ALKPHOS 77 08/14/2021    EGFR 86 08/14/2021         Vital Sign:    Body surface area is 1 74 meters squared      Wt Readings from Last 3 Encounters:   09/20/21 68 9 kg (152 lb)   09/09/21 68 kg (150 lb)   08/26/21 68 9 kg (152 lb)        Temp Readings from Last 3 Encounters:   09/20/21 98 8 °F (37 1 °C) (Tympanic Core)   09/09/21 98 4 °F (36 9 °C) (Temporal)   08/26/21 98 °F (36 7 °C)        BP Readings from Last 3 Encounters:   09/20/21 142/98   09/09/21 120/60   08/26/21 144/69         Pulse Readings from Last 3 Encounters:   09/20/21 97   09/09/21 65   08/26/21 82     @LASTSAO2(3)@    Active Problems:   Patient Active Problem List   Diagnosis    Fibrocystic breast disease    Screening mammogram, encounter for    Closed nondisplaced transverse fracture of right patella    Numbness and tingling of left upper and lower extremity    Essential hypertension    TIA (transient ischemic attack)    Abnormal mammogram    Malignant neoplasm of upper-outer quadrant of left breast in female, estrogen receptor positive ([facility] [address] )    Hyperlipidemia    Anxiety    Arthritis       Past Medical History:   Past Medical History:   Diagnosis Date    Acute URI     Anxiety     Arthritis     Atypical chest pain     Fibrocystic breast disease     Hypercholesterolemia     Osteopenia     Palpitations     Psoriasis     Rib pain on left side     Viral conjunctivitis        Surgical History:   Past Surgical History:   Procedure Laterality Date    BREAST BIOPSY      BREAST LUMPECTOMY Left 8/26/2021    Procedure: BREAST CHELLY LOCALIZED LUMPECTOMY;  Surgeon: Sarah Bee MD;  [facility];  Service: Surgical Oncology    COLONOSCOPY      FOOT SURGERY Left     cyst removal    INTRAOPERATIVE RADIATION THERAPY (IORT) Left 8/26/2021    Procedure: BREAST INTRAOPERATIVE RADIATION THERAPY (IORT) BY DR SCANLON; Surgeon: Sarah Bee MD;  [facility];  Service: Surgical Oncology    LYMPH NODE BIOPSY Left 8/26/2021    Procedure: BIOPSY LYMPH NODE SENTINEL (NUC MED INJECTION AT 0900);   Surgeon: Sarah Bee MD;  [facility];  Service: Surgical Oncology    TONSILLECTOMY      US BREAST NEEDLE LOC LEFT Left 8/19/2021    US GUIDANCE BREAST BIOPSY LEFT EACH ADDITIONAL Left 7/12/2021    US GUIDED BREAST BIOPSY LEFT COMPLETE Left 7/12/2021       Family History:    Family History   Problem Relation Age of Onset    Skin cancer Father         age dx Jamia Moh Lung cancer Maternal Grandfather         age dx unk     No Known Problems Mother     No Known Problems Daughter     No Known Problems Maternal Grandmother     No Known Problems Paternal Grandmother     No Known Problems Paternal Grandfather     No Known Problems Maternal Aunt     No Known Problems Maternal Aunt     No Known Problems Maternal Aunt     No Known Problems Cousin     No Known Problems Cousin     No Known Problems Paternal Aunt     No Known Problems Cousin     No Known Problems Cousin        Cancer-related family history includes Lung cancer in her maternal grandfather; Skin cancer in her father  Social History:   Social History     Socioeconomic History    Marital status: /Civil Union     Spouse name: Not on file    Number of children: Not on file    Years of education: Not on file    Highest education level: Not on file   Occupational History    Not on file   Tobacco Use    Smoking status: Never Smoker    Smokeless tobacco: Never Used   Vaping Use    Vaping Use: Never used   Substance and Sexual Activity    Alcohol use: Yes     Comment: seldom    Drug use: Not Currently    Sexual activity: Not on file   Other Topics Concern    Not on file   Social History Narrative    Not on file     Social Determinants of Health     Financial Resource Strain:     Difficulty of Paying Living Expenses:    Food Insecurity:     Worried About Running Out of Food in the Last Year:     [address] in the Last Year:    Transportation Needs:     Lack of Transportation (Medical):      Lack of Transportation (Non-Medical):    Physical Activity:     Days of Exercise per Week:     Minutes of Exercise per Session:    Stress:     Feeling of Stress :    Social Connections:     Frequency of Communication with Friends and Family:     Frequency of Social Gatherings with Friends and Family:     Attends [employer] Services:     Active Member of Clubs or Organizations:     Attends Club or Organization Meetings:     Marital Status:    Intimate Partner Violence:     Fear of Current or Ex-Partner:     Emotionally Abused:     Physically Abused:     Sexually Abused:        Current Medications:   Current Outpatient Medications   Medication Sig Dispense Refill    atorvastatin (LIPITOR) 20 mg tablet 20 mg daily at bedtime       Cholecalciferol (VITAMIN D-3 PO) Take 2,000 Int'l Units by mouth daily      PARoxetine (PAXIL) 20 mg tablet 20 mg every morning       traMADol (ULTRAM) 50 mg tablet Take 1 tablet (50 mg total) by mouth every 8 (eight) hours as needed for moderate pain (Patient not taking: Reported on 9/9/2021) 9 tablet 0     No current facility-administered medications for this visit         Allergies: No Known Allergies

## 2021-09-20 NOTE — PROGRESS NOTES
Hematology / Oncology Outpatient Consult Note    Sydney Avila 78 y o  female [id number]         Date:  9/20/2021    Assessment / Plan:    A 40-year-old postmenopausal woman with newly diagnosed stage I B left breast cancer, grade 1, ER 90% positive, GA negative, HER2 fish negative disease  She underwent lumpectomy and sentinel lymph node biopsy, resulting in LEO  She had 1 positive lymph node with micrometastasis  She presents today with her  to discuss adjuvant treatment options  We had extensive discussion regarding the diagnosis, staging information, tumor phenotype, prognosis as well as treatment options  It is clear that she will need adjuvant hormonal therapy with aromatase inhibitor for 5 years  However, indication for adjuvant chemotherapy is not clear based on the clinical characteristics  I think it is reasonable to send her tumor tissue for Oncotype DX testing  Because the low-grade disease, it is more likely that Oncotype DX recurrence score being low in which case I would recommend hormonal therapy alone  In case, Oncotype DX recurrence score is more than 25, I would recommend adjuvant chemotherapy with Taxotere with cyclophosphamide x4 cycle followed by aromatase inhibitor  Side effects of anastrozole was thoroughly discussed, including but not limited to hot flashes, musculoskeletal symptom and bone mineral density loss  I recommended her to take calcium vitamin-D on a regular basis  She is in agreement with my recommendation  Once obtain the Oncotype DX recurrence score, I will contact her and treat her accordingly  I will see her again in 6 months for routine follow-up, assuming that she will have adjuvant hormonal therapy alone  Subjective:     HPI:    A 40-year-old postmenopausal woman who was found to have radiographic abnormality in her right breast based on a screening mammography    Therefore, she underwent right breast biopsy in July 12, 2021, which showed invasive ductal carcinoma, grade 1  This was ER 90% positive, TX negative, HER2 2+ disease  HER2 fish was negative for gene application with ratio of 1 0  She subsequent underwent lumpectomy and sentinel lymph node biopsy by Dr Steve Baker in August 26, 2021  She had 9 x 8 x 5 mm of invasive ductal carcinoma, grade 1  Lymphovascular invasion was present  1/3 sentinel lymph nodes or had micrometastasis measuring 1 mm  There was no evidence of extranodal extension  She had intraoperative radiation therapy at the time of lumpectomy  She presents today to discuss adjuvant treatment options  She still has some discomfort in her left breast   Otherwise, she feels well  She denied any bone pain  She has no respiratory symptoms  Her weight is stable  Her performance status is normal   She has no family history of breast or ovarian cancer  She is a lifetime never smoker  Interval History:          Objective:     Primary Diagnosis:      Left breast cancer, stage I B ( pT1b, pN1a (mi), M0) grade 1, ER 90% positive, TX negative, HER2 fish negative disease  Diagnosed in August 2021  Cancer Staging:  Cancer Staging  Malignant neoplasm of upper-outer quadrant of left breast in female, estrogen receptor positive ([facility] [address] )  Staging form: Breast, AJCC 8th Edition  - Clinical: Stage IA (cT1a, cN0, cM0, G1, ER+, TX-, HER2-) - Signed by Brady Ibrahim MD on 8/5/2021  Stage prefix: Initial diagnosis  Method of lymph node assessment: Clinical  Histologic grading system: 3 grade system  - Pathologic: Stage IA (pT1b, pN1mi(sn), cM0, G1, ER+, TX-, HER2-) - Signed by Brady Ibrahim MD on 9/9/2021  Stage prefix: Initial diagnosis  Method of lymph node assessment: [place] lymph node biopsy  Histologic grading system: 3 grade system        Previous Hematologic/ Oncologic Treatment:         Current Hematologic/ Oncologic Treatment:       to be determined      Disease Status:       LEO status post lumpectomy and sentinel lymph node biopsy  Test Results:    Pathology:      9 x 8 x 5 mm of invasive ductal carcinoma, grade 1  Lymphovascular invasion was present  1/3 sentinel lymph nodes had positive micrometastasis, measuring 1 mm with no extranodal extension  ER 90% positive, ME negative, HER2 2+ disease  HER2 fish was negative for gene application with ratio of 1 0  Stage I B ( pT1b, pN1 ( mi), M0)    Radiology:        Laboratory:     see below  Physical Exam:      General Appearance:    Alert, oriented        Eyes:    PERRL   Ears:    Normal external ear canals, both ears   Nose:   Nares normal, septum midline   Throat:   Mucosa moist  Pharynx without injection  Neck:   Supple       Lungs:     Clear to auscultation bilaterally   Chest Wall:    No tenderness or deformity    Heart:    Regular rate and rhythm       Abdomen:     Soft, non-tender, bowel sounds +, no organomegaly           Extremities:   Extremities no cyanosis or edema       Skin:   no rash or icterus  Lymph nodes:   Cervical, supraclavicular, and axillary nodes normal   Neurologic:   CNII-XII intact, normal strength, sensation and reflexes     Throughout          Breast exam:     Lumpectomy scar at 3:00 a m  position in her left breast with no palpable abnormality  Right breast exam is negative  ROS: Review of Systems   All other systems reviewed and are negative  Imaging: NM lymphatic breast    Result Date: 8/26/2021  Narrative: SENTINEL NODE LYMPHOSCINTIGRAPHY INDICATION: Left breast carcinoma, localize sentinel node FINDINGS: 0 53 mCi Tc-99m sulfur colloid (0 6 cc volume) was administered in divided doses by myself intradermally in the left breast  Scintigraphic images were obtained over the left hemithorax and axilla in multiple projections  A left axillary sentinel node was  identified  Using scintigraphic guidance, the corresponding skin site was marked with an indelible marker    The patient was transferred to the operating room in satisfactory condition  Impression: [place] lymph node localized to the left axilla  Workstation performed: [id number]     Mammo grace  breast specimen (No Charge)    Result Date: 8/27/2021  Narrative: Surgical specimen Two views AP surgical specimen were provided for non contemporaneous review  Biopsy marker clip and GRACE  radiofrequency reflector are noted within the specimen  The findings were not discussed with the surgeon at the time of surgery  Labs:   Lab Results   Component Value Date    WBC 5 11 08/14/2021    HGB 12 9 08/14/2021    HCT 40 0 08/14/2021    MCV 98 08/14/2021     08/14/2021     Lab Results   Component Value Date    K 3 8 08/14/2021     08/14/2021    CO2 25 08/14/2021    BUN 12 08/14/2021    CREATININE 0 73 08/14/2021    GLUF 90 08/14/2021    CALCIUM 8 8 08/14/2021    AST 26 08/14/2021    ALT 44 08/14/2021    ALKPHOS 77 08/14/2021    EGFR 86 08/14/2021         Vital Sign:    Body surface area is 1 74 meters squared      Wt Readings from Last 3 Encounters:   09/20/21 68 9 kg (152 lb)   09/09/21 68 kg (150 lb)   08/26/21 68 9 kg (152 lb)        Temp Readings from Last 3 Encounters:   09/20/21 98 8 °F (37 1 °C) (Tympanic Core)   09/09/21 98 4 °F (36 9 °C) (Temporal)   08/26/21 98 °F (36 7 °C)        BP Readings from Last 3 Encounters:   09/20/21 142/98   09/09/21 120/60   08/26/21 144/69         Pulse Readings from Last 3 Encounters:   09/20/21 97   09/09/21 65   08/26/21 82     @LASTSAO2(3)@    Active Problems:   Patient Active Problem List   Diagnosis    Fibrocystic breast disease    Screening mammogram, encounter for    Closed nondisplaced transverse fracture of right patella    Numbness and tingling of left upper and lower extremity    Essential hypertension    TIA (transient ischemic attack)    Abnormal mammogram    Malignant neoplasm of upper-outer quadrant of left breast in female, estrogen receptor positive ([facility] [address] )    Hyperlipidemia    Anxiety    Arthritis       Past Medical History:   Past Medical History:   Diagnosis Date    Acute URI     Anxiety     Arthritis     Atypical chest pain     Fibrocystic breast disease     Hypercholesterolemia     Osteopenia     Palpitations     Psoriasis     Rib pain on left side     Viral conjunctivitis        Surgical History:   Past Surgical History:   Procedure Laterality Date    BREAST BIOPSY      BREAST LUMPECTOMY Left 8/26/2021    Procedure: BREAST CHELLY LOCALIZED LUMPECTOMY;  Surgeon: Laura Amezcua MD;  [facility];  Service: Surgical Oncology    COLONOSCOPY      FOOT SURGERY Left     cyst removal    INTRAOPERATIVE RADIATION THERAPY (IORT) Left 8/26/2021    Procedure: BREAST INTRAOPERATIVE RADIATION THERAPY (IORT) BY DR SCANLON; Surgeon: Laura Amezcua MD;  [facility];  Service: Surgical Oncology    LYMPH NODE BIOPSY Left 8/26/2021    Procedure: BIOPSY LYMPH NODE SENTINEL (NUC MED INJECTION AT 0900);   Surgeon: Laura Amezcua MD;  [facility];  Service: Surgical Oncology    TONSILLECTOMY      US BREAST NEEDLE LOC LEFT Left 8/19/2021    US GUIDANCE BREAST BIOPSY LEFT EACH ADDITIONAL Left 7/12/2021    US GUIDED BREAST BIOPSY LEFT COMPLETE Left 7/12/2021       Family History:    Family History   Problem Relation Age of Onset    Skin cancer Father         age dx unk    [employer] Lung cancer Maternal Grandfather         age dx unk     No Known Problems Mother     No Known Problems Daughter     No Known Problems Maternal Grandmother     No Known Problems Paternal Grandmother     No Known Problems Paternal Grandfather     No Known Problems Maternal Aunt     No Known Problems Maternal Aunt     No Known Problems Maternal Aunt     No Known Problems Cousin     No Known Problems Cousin     No Known Problems Paternal Aunt     No Known Problems Cousin     No Known Problems Cousin        Cancer-related family history includes Lung cancer in her maternal grandfather; Skin cancer in her father  Social History:   Social History     Socioeconomic History    Marital status: /Civil Union     Spouse name: Not on file    Number of children: Not on file    Years of education: Not on file    Highest education level: Not on file   Occupational History    Not on file   Tobacco Use    Smoking status: Never Smoker    Smokeless tobacco: Never Used   Vaping Use    Vaping Use: Never used   Substance and Sexual Activity    Alcohol use: Yes     Comment: seldom    Drug use: Not Currently    Sexual activity: Not on file   Other Topics Concern    Not on file   Social History Narrative    Not on file     Social Determinants of Health     Financial Resource Strain:     Difficulty of Paying Living Expenses:    Food Insecurity:     Worried About Running Out of Food in the Last Year:     [address] in the Last Year:    Transportation Needs:     Lack of Transportation (Medical):      Lack of Transportation (Non-Medical):    Physical Activity:     Days of Exercise per Week:     Minutes of Exercise per Session:    Stress:     Feeling of Stress :    Social Connections:     Frequency of Communication with Friends and Family:     Frequency of Social Gatherings with Friends and Family:     Attends Protestant Services:     Active Member of Clubs or Organizations:     Attends Club or Organization Meetings:     Marital Status:    Intimate Partner Violence:     Fear of Current or Ex-Partner:     Emotionally Abused:     Physically Abused:     Sexually Abused:        Current Medications:   Current Outpatient Medications   Medication Sig Dispense Refill    atorvastatin (LIPITOR) 20 mg tablet 20 mg daily at bedtime       Cholecalciferol (VITAMIN D-3 PO) Take 2,000 Int'l Units by mouth daily      PARoxetine (PAXIL) 20 mg tablet 20 mg every morning       traMADol (ULTRAM) 50 mg tablet Take 1 tablet (50 mg total) by mouth every 8 (eight) hours as needed for moderate pain (Patient not taking: Reported on 9/9/2021) 9 tablet 0     No current facility-administered medications for this visit         Allergies: No Known Allergies

## 2021-09-28 LAB — SCAN RESULT: NORMAL

## 2021-10-11 ENCOUNTER — TELEPHONE (OUTPATIENT)
Dept: HEMATOLOGY ONCOLOGY | Facility: CLINIC | Age: 66
End: 2021-10-11

## 2021-10-14 ENCOUNTER — RADIATION ONCOLOGY FOLLOW-UP (OUTPATIENT)
Dept: RADIATION ONCOLOGY | Facility: HOSPITAL | Age: 66
End: 2021-10-14
Attending: RADIOLOGY
Payer: MEDICARE

## 2021-10-14 VITALS
RESPIRATION RATE: 18 BRPM | WEIGHT: 151.8 LBS | HEIGHT: 64 IN | DIASTOLIC BLOOD PRESSURE: 82 MMHG | BODY MASS INDEX: 25.92 KG/M2 | SYSTOLIC BLOOD PRESSURE: 150 MMHG | HEART RATE: 103 BPM | OXYGEN SATURATION: 98 % | TEMPERATURE: 98.6 F

## 2021-10-14 DIAGNOSIS — C50.412 MALIGNANT NEOPLASM OF UPPER-OUTER QUADRANT OF LEFT BREAST IN FEMALE, ESTROGEN RECEPTOR POSITIVE (HCC): Primary | ICD-10-CM

## 2021-10-14 DIAGNOSIS — Z17.0 MALIGNANT NEOPLASM OF UPPER-OUTER QUADRANT OF LEFT BREAST IN FEMALE, ESTROGEN RECEPTOR POSITIVE (HCC): Primary | ICD-10-CM

## 2021-10-14 PROCEDURE — 99211 OFF/OP EST MAY X REQ PHY/QHP: CPT | Performed by: RADIOLOGY

## 2021-10-14 PROCEDURE — 77263 THER RADIOLOGY TX PLNG CPLX: CPT | Performed by: RADIOLOGY

## 2021-10-14 PROCEDURE — 99215 OFFICE O/P EST HI 40 MIN: CPT | Performed by: RADIOLOGY

## 2021-10-28 ENCOUNTER — RADIATION THERAPY TREATMENT (OUTPATIENT)
Dept: RADIATION ONCOLOGY | Facility: HOSPITAL | Age: 66
End: 2021-10-28
Attending: RADIOLOGY
Payer: MEDICARE

## 2021-10-28 PROCEDURE — 77332 RADIATION TREATMENT AID(S): CPT | Performed by: RADIOLOGY

## 2021-10-28 PROCEDURE — 77290 THER RAD SIMULAJ FIELD CPLX: CPT | Performed by: RADIOLOGY

## 2021-11-01 ENCOUNTER — APPOINTMENT (OUTPATIENT)
Dept: RADIATION ONCOLOGY | Facility: HOSPITAL | Age: 66
End: 2021-11-01
Attending: RADIOLOGY
Payer: MEDICARE

## 2021-11-01 PROCEDURE — 77295 3-D RADIOTHERAPY PLAN: CPT | Performed by: RADIOLOGY

## 2021-11-01 PROCEDURE — 77300 RADIATION THERAPY DOSE PLAN: CPT | Performed by: RADIOLOGY

## 2021-11-01 PROCEDURE — 77334 RADIATION TREATMENT AID(S): CPT | Performed by: RADIOLOGY

## 2021-11-03 ENCOUNTER — TELEPHONE (OUTPATIENT)
Dept: HEMATOLOGY ONCOLOGY | Facility: CLINIC | Age: 66
End: 2021-11-03

## 2021-11-04 ENCOUNTER — OFFICE VISIT (OUTPATIENT)
Dept: SURGICAL ONCOLOGY | Facility: CLINIC | Age: 66
End: 2021-11-04

## 2021-11-04 VITALS
HEART RATE: 74 BPM | WEIGHT: 150 LBS | OXYGEN SATURATION: 99 % | DIASTOLIC BLOOD PRESSURE: 98 MMHG | SYSTOLIC BLOOD PRESSURE: 142 MMHG | BODY MASS INDEX: 25.61 KG/M2 | TEMPERATURE: 98.1 F | HEIGHT: 64 IN | RESPIRATION RATE: 16 BRPM

## 2021-11-04 DIAGNOSIS — N64.89 SEROMA OF BREAST: Primary | ICD-10-CM

## 2021-11-04 PROCEDURE — 99024 POSTOP FOLLOW-UP VISIT: CPT | Performed by: SURGERY

## 2021-11-08 ENCOUNTER — APPOINTMENT (OUTPATIENT)
Dept: RADIATION ONCOLOGY | Facility: HOSPITAL | Age: 66
End: 2021-11-08
Attending: RADIOLOGY
Payer: MEDICARE

## 2021-11-08 ENCOUNTER — APPOINTMENT (OUTPATIENT)
Dept: RADIATION ONCOLOGY | Facility: HOSPITAL | Age: 66
End: 2021-11-08
Payer: MEDICARE

## 2021-11-08 PROCEDURE — 77280 THER RAD SIMULAJ FIELD SMPL: CPT | Performed by: RADIOLOGY

## 2021-11-09 ENCOUNTER — APPOINTMENT (OUTPATIENT)
Dept: RADIATION ONCOLOGY | Facility: HOSPITAL | Age: 66
End: 2021-11-09
Attending: RADIOLOGY
Payer: MEDICARE

## 2021-11-09 PROCEDURE — 77412 RADIATION TX DELIVERY LVL 3: CPT | Performed by: RADIOLOGY

## 2021-11-09 PROCEDURE — 77427 RADIATION TX MANAGEMENT X5: CPT | Performed by: RADIOLOGY

## 2021-11-09 PROCEDURE — 77387 GUIDANCE FOR RADJ TX DLVR: CPT | Performed by: RADIOLOGY

## 2021-11-10 ENCOUNTER — APPOINTMENT (OUTPATIENT)
Dept: RADIATION ONCOLOGY | Facility: HOSPITAL | Age: 66
End: 2021-11-10
Attending: RADIOLOGY
Payer: MEDICARE

## 2021-11-10 PROCEDURE — 77387 GUIDANCE FOR RADJ TX DLVR: CPT | Performed by: STUDENT IN AN ORGANIZED HEALTH CARE EDUCATION/TRAINING PROGRAM

## 2021-11-10 PROCEDURE — 77412 RADIATION TX DELIVERY LVL 3: CPT | Performed by: STUDENT IN AN ORGANIZED HEALTH CARE EDUCATION/TRAINING PROGRAM

## 2021-11-11 ENCOUNTER — APPOINTMENT (OUTPATIENT)
Dept: RADIATION ONCOLOGY | Facility: HOSPITAL | Age: 66
End: 2021-11-11
Attending: RADIOLOGY
Payer: MEDICARE

## 2021-11-11 PROCEDURE — 77387 GUIDANCE FOR RADJ TX DLVR: CPT | Performed by: STUDENT IN AN ORGANIZED HEALTH CARE EDUCATION/TRAINING PROGRAM

## 2021-11-11 PROCEDURE — 77412 RADIATION TX DELIVERY LVL 3: CPT | Performed by: STUDENT IN AN ORGANIZED HEALTH CARE EDUCATION/TRAINING PROGRAM

## 2021-11-12 ENCOUNTER — TELEPHONE (OUTPATIENT)
Dept: HEMATOLOGY ONCOLOGY | Facility: CLINIC | Age: 66
End: 2021-11-12

## 2021-11-12 ENCOUNTER — APPOINTMENT (OUTPATIENT)
Dept: RADIATION ONCOLOGY | Facility: HOSPITAL | Age: 66
End: 2021-11-12
Attending: RADIOLOGY
Payer: MEDICARE

## 2021-11-12 PROCEDURE — G6017 INTRAFRACTION TRACK MOTION: HCPCS | Performed by: RADIOLOGY

## 2021-11-12 PROCEDURE — 77387 GUIDANCE FOR RADJ TX DLVR: CPT | Performed by: RADIOLOGY

## 2021-11-12 PROCEDURE — 77412 RADIATION TX DELIVERY LVL 3: CPT | Performed by: RADIOLOGY

## 2021-11-14 ENCOUNTER — APPOINTMENT (OUTPATIENT)
Dept: LAB | Age: 66
End: 2021-11-14
Payer: MEDICARE

## 2021-11-14 DIAGNOSIS — Z17.0 MALIGNANT NEOPLASM OF UPPER-OUTER QUADRANT OF LEFT BREAST IN FEMALE, ESTROGEN RECEPTOR POSITIVE (HCC): ICD-10-CM

## 2021-11-14 DIAGNOSIS — C50.412 MALIGNANT NEOPLASM OF UPPER-OUTER QUADRANT OF LEFT BREAST IN FEMALE, ESTROGEN RECEPTOR POSITIVE (HCC): ICD-10-CM

## 2021-11-14 LAB
BASOPHILS # BLD AUTO: 0.05 THOUSANDS/ΜL (ref 0–0.1)
BASOPHILS NFR BLD AUTO: 1 % (ref 0–1)
EOSINOPHIL # BLD AUTO: 0.1 THOUSAND/ΜL (ref 0–0.61)
EOSINOPHIL NFR BLD AUTO: 2 % (ref 0–6)
ERYTHROCYTE [DISTWIDTH] IN BLOOD BY AUTOMATED COUNT: 12.5 % (ref 11.6–15.1)
HCT VFR BLD AUTO: 39 % (ref 34.8–46.1)
HGB BLD-MCNC: 12.7 G/DL (ref 11.5–15.4)
IMM GRANULOCYTES # BLD AUTO: 0.02 THOUSAND/UL (ref 0–0.2)
IMM GRANULOCYTES NFR BLD AUTO: 0 % (ref 0–2)
LYMPHOCYTES # BLD AUTO: 1.16 THOUSANDS/ΜL (ref 0.6–4.47)
LYMPHOCYTES NFR BLD AUTO: 18 % (ref 14–44)
MCH RBC QN AUTO: 31.4 PG (ref 26.8–34.3)
MCHC RBC AUTO-ENTMCNC: 32.6 G/DL (ref 31.4–37.4)
MCV RBC AUTO: 96 FL (ref 82–98)
MONOCYTES # BLD AUTO: 0.72 THOUSAND/ΜL (ref 0.17–1.22)
MONOCYTES NFR BLD AUTO: 11 % (ref 4–12)
NEUTROPHILS # BLD AUTO: 4.3 THOUSANDS/ΜL (ref 1.85–7.62)
NEUTS SEG NFR BLD AUTO: 68 % (ref 43–75)
NRBC BLD AUTO-RTO: 0 /100 WBCS
PLATELET # BLD AUTO: 265 THOUSANDS/UL (ref 149–390)
PMV BLD AUTO: 11.1 FL (ref 8.9–12.7)
RBC # BLD AUTO: 4.05 MILLION/UL (ref 3.81–5.12)
WBC # BLD AUTO: 6.35 THOUSAND/UL (ref 4.31–10.16)

## 2021-11-14 PROCEDURE — 85025 COMPLETE CBC W/AUTO DIFF WBC: CPT

## 2021-11-14 PROCEDURE — 36415 COLL VENOUS BLD VENIPUNCTURE: CPT

## 2021-11-15 ENCOUNTER — APPOINTMENT (OUTPATIENT)
Dept: RADIATION ONCOLOGY | Facility: HOSPITAL | Age: 66
End: 2021-11-15
Attending: RADIOLOGY
Payer: MEDICARE

## 2021-11-15 PROCEDURE — 77336 RADIATION PHYSICS CONSULT: CPT | Performed by: RADIOLOGY

## 2021-11-15 PROCEDURE — 77387 GUIDANCE FOR RADJ TX DLVR: CPT | Performed by: STUDENT IN AN ORGANIZED HEALTH CARE EDUCATION/TRAINING PROGRAM

## 2021-11-15 PROCEDURE — 77412 RADIATION TX DELIVERY LVL 3: CPT | Performed by: STUDENT IN AN ORGANIZED HEALTH CARE EDUCATION/TRAINING PROGRAM

## 2021-11-16 ENCOUNTER — APPOINTMENT (OUTPATIENT)
Dept: RADIATION ONCOLOGY | Facility: HOSPITAL | Age: 66
End: 2021-11-16
Attending: RADIOLOGY
Payer: MEDICARE

## 2021-11-16 PROCEDURE — 77387 GUIDANCE FOR RADJ TX DLVR: CPT | Performed by: RADIOLOGY

## 2021-11-16 PROCEDURE — 77427 RADIATION TX MANAGEMENT X5: CPT | Performed by: RADIOLOGY

## 2021-11-16 PROCEDURE — G6017 INTRAFRACTION TRACK MOTION: HCPCS | Performed by: RADIOLOGY

## 2021-11-16 PROCEDURE — 77412 RADIATION TX DELIVERY LVL 3: CPT | Performed by: RADIOLOGY

## 2021-11-17 ENCOUNTER — APPOINTMENT (OUTPATIENT)
Dept: RADIATION ONCOLOGY | Facility: HOSPITAL | Age: 66
End: 2021-11-17
Attending: RADIOLOGY
Payer: MEDICARE

## 2021-11-17 PROCEDURE — 77387 GUIDANCE FOR RADJ TX DLVR: CPT | Performed by: STUDENT IN AN ORGANIZED HEALTH CARE EDUCATION/TRAINING PROGRAM

## 2021-11-17 PROCEDURE — 77412 RADIATION TX DELIVERY LVL 3: CPT | Performed by: STUDENT IN AN ORGANIZED HEALTH CARE EDUCATION/TRAINING PROGRAM

## 2021-11-18 ENCOUNTER — APPOINTMENT (OUTPATIENT)
Dept: RADIATION ONCOLOGY | Facility: HOSPITAL | Age: 66
End: 2021-11-18
Attending: RADIOLOGY
Payer: MEDICARE

## 2021-11-18 PROCEDURE — 77387 GUIDANCE FOR RADJ TX DLVR: CPT | Performed by: STUDENT IN AN ORGANIZED HEALTH CARE EDUCATION/TRAINING PROGRAM

## 2021-11-18 PROCEDURE — 77412 RADIATION TX DELIVERY LVL 3: CPT | Performed by: STUDENT IN AN ORGANIZED HEALTH CARE EDUCATION/TRAINING PROGRAM

## 2021-11-19 ENCOUNTER — APPOINTMENT (OUTPATIENT)
Dept: RADIATION ONCOLOGY | Facility: HOSPITAL | Age: 66
End: 2021-11-19
Attending: RADIOLOGY
Payer: MEDICARE

## 2021-11-19 PROCEDURE — 77412 RADIATION TX DELIVERY LVL 3: CPT | Performed by: RADIOLOGY

## 2021-11-19 PROCEDURE — G6017 INTRAFRACTION TRACK MOTION: HCPCS | Performed by: RADIOLOGY

## 2021-11-19 PROCEDURE — 77387 GUIDANCE FOR RADJ TX DLVR: CPT | Performed by: RADIOLOGY

## 2021-11-21 ENCOUNTER — RADIATION THERAPY TREATMENT (OUTPATIENT)
Dept: RADIATION ONCOLOGY | Facility: HOSPITAL | Age: 66
End: 2021-11-21
Attending: RADIOLOGY
Payer: MEDICARE

## 2021-11-21 PROCEDURE — 77412 RADIATION TX DELIVERY LVL 3: CPT | Performed by: STUDENT IN AN ORGANIZED HEALTH CARE EDUCATION/TRAINING PROGRAM

## 2021-11-21 PROCEDURE — 77336 RADIATION PHYSICS CONSULT: CPT | Performed by: RADIOLOGY

## 2021-11-21 PROCEDURE — 77387 GUIDANCE FOR RADJ TX DLVR: CPT | Performed by: STUDENT IN AN ORGANIZED HEALTH CARE EDUCATION/TRAINING PROGRAM

## 2021-11-22 ENCOUNTER — APPOINTMENT (OUTPATIENT)
Dept: RADIATION ONCOLOGY | Facility: HOSPITAL | Age: 66
End: 2021-11-22
Attending: RADIOLOGY
Payer: MEDICARE

## 2021-11-22 PROCEDURE — 77427 RADIATION TX MANAGEMENT X5: CPT | Performed by: RADIOLOGY

## 2021-11-22 PROCEDURE — G6017 INTRAFRACTION TRACK MOTION: HCPCS | Performed by: STUDENT IN AN ORGANIZED HEALTH CARE EDUCATION/TRAINING PROGRAM

## 2021-11-22 PROCEDURE — 77387 GUIDANCE FOR RADJ TX DLVR: CPT | Performed by: STUDENT IN AN ORGANIZED HEALTH CARE EDUCATION/TRAINING PROGRAM

## 2021-11-22 PROCEDURE — 77412 RADIATION TX DELIVERY LVL 3: CPT | Performed by: STUDENT IN AN ORGANIZED HEALTH CARE EDUCATION/TRAINING PROGRAM

## 2021-11-23 ENCOUNTER — APPOINTMENT (OUTPATIENT)
Dept: RADIATION ONCOLOGY | Facility: HOSPITAL | Age: 66
End: 2021-11-23
Attending: RADIOLOGY
Payer: MEDICARE

## 2021-11-23 PROCEDURE — 77412 RADIATION TX DELIVERY LVL 3: CPT | Performed by: RADIOLOGY

## 2021-11-23 PROCEDURE — G6017 INTRAFRACTION TRACK MOTION: HCPCS | Performed by: RADIOLOGY

## 2021-11-23 PROCEDURE — 77387 GUIDANCE FOR RADJ TX DLVR: CPT | Performed by: RADIOLOGY

## 2021-11-24 ENCOUNTER — APPOINTMENT (OUTPATIENT)
Dept: RADIATION ONCOLOGY | Facility: HOSPITAL | Age: 66
End: 2021-11-24
Attending: RADIOLOGY
Payer: MEDICARE

## 2021-11-24 PROCEDURE — 77387 GUIDANCE FOR RADJ TX DLVR: CPT | Performed by: STUDENT IN AN ORGANIZED HEALTH CARE EDUCATION/TRAINING PROGRAM

## 2021-11-24 PROCEDURE — 77412 RADIATION TX DELIVERY LVL 3: CPT | Performed by: STUDENT IN AN ORGANIZED HEALTH CARE EDUCATION/TRAINING PROGRAM

## 2021-11-29 ENCOUNTER — APPOINTMENT (OUTPATIENT)
Dept: RADIATION ONCOLOGY | Facility: HOSPITAL | Age: 66
End: 2021-11-29
Attending: RADIOLOGY
Payer: MEDICARE

## 2021-11-29 PROCEDURE — 77387 GUIDANCE FOR RADJ TX DLVR: CPT | Performed by: STUDENT IN AN ORGANIZED HEALTH CARE EDUCATION/TRAINING PROGRAM

## 2021-11-29 PROCEDURE — 77412 RADIATION TX DELIVERY LVL 3: CPT | Performed by: STUDENT IN AN ORGANIZED HEALTH CARE EDUCATION/TRAINING PROGRAM

## 2021-11-30 ENCOUNTER — APPOINTMENT (OUTPATIENT)
Dept: RADIATION ONCOLOGY | Facility: HOSPITAL | Age: 66
End: 2021-11-30
Attending: RADIOLOGY
Payer: MEDICARE

## 2021-11-30 PROCEDURE — 77412 RADIATION TX DELIVERY LVL 3: CPT | Performed by: RADIOLOGY

## 2021-11-30 PROCEDURE — 77336 RADIATION PHYSICS CONSULT: CPT | Performed by: RADIOLOGY

## 2021-11-30 PROCEDURE — G6017 INTRAFRACTION TRACK MOTION: HCPCS | Performed by: RADIOLOGY

## 2021-11-30 PROCEDURE — 77387 GUIDANCE FOR RADJ TX DLVR: CPT | Performed by: RADIOLOGY

## 2021-12-01 ENCOUNTER — APPOINTMENT (OUTPATIENT)
Dept: RADIATION ONCOLOGY | Facility: HOSPITAL | Age: 66
End: 2021-12-01
Attending: RADIOLOGY
Payer: MEDICARE

## 2021-12-01 PROCEDURE — 77427 RADIATION TX MANAGEMENT X5: CPT | Performed by: RADIOLOGY

## 2021-12-01 PROCEDURE — 77387 GUIDANCE FOR RADJ TX DLVR: CPT | Performed by: STUDENT IN AN ORGANIZED HEALTH CARE EDUCATION/TRAINING PROGRAM

## 2021-12-01 PROCEDURE — 77412 RADIATION TX DELIVERY LVL 3: CPT | Performed by: STUDENT IN AN ORGANIZED HEALTH CARE EDUCATION/TRAINING PROGRAM

## 2021-12-02 ENCOUNTER — APPOINTMENT (OUTPATIENT)
Dept: RADIATION ONCOLOGY | Facility: HOSPITAL | Age: 66
End: 2021-12-02
Attending: RADIOLOGY
Payer: MEDICARE

## 2021-12-02 DIAGNOSIS — C50.412 MALIGNANT NEOPLASM OF UPPER-OUTER QUADRANT OF LEFT BREAST IN FEMALE, ESTROGEN RECEPTOR POSITIVE (HCC): Primary | ICD-10-CM

## 2021-12-02 DIAGNOSIS — Z17.0 MALIGNANT NEOPLASM OF UPPER-OUTER QUADRANT OF LEFT BREAST IN FEMALE, ESTROGEN RECEPTOR POSITIVE (HCC): Primary | ICD-10-CM

## 2021-12-02 PROCEDURE — G6017 INTRAFRACTION TRACK MOTION: HCPCS | Performed by: RADIOLOGY

## 2021-12-02 PROCEDURE — 77387 GUIDANCE FOR RADJ TX DLVR: CPT | Performed by: RADIOLOGY

## 2021-12-02 PROCEDURE — 77412 RADIATION TX DELIVERY LVL 3: CPT | Performed by: RADIOLOGY

## 2021-12-03 ENCOUNTER — APPOINTMENT (OUTPATIENT)
Dept: RADIATION ONCOLOGY | Facility: HOSPITAL | Age: 66
End: 2021-12-03
Attending: RADIOLOGY
Payer: MEDICARE

## 2021-12-03 PROCEDURE — 77412 RADIATION TX DELIVERY LVL 3: CPT | Performed by: STUDENT IN AN ORGANIZED HEALTH CARE EDUCATION/TRAINING PROGRAM

## 2021-12-03 PROCEDURE — 77387 GUIDANCE FOR RADJ TX DLVR: CPT | Performed by: STUDENT IN AN ORGANIZED HEALTH CARE EDUCATION/TRAINING PROGRAM

## 2021-12-06 ENCOUNTER — APPOINTMENT (OUTPATIENT)
Dept: RADIATION ONCOLOGY | Facility: HOSPITAL | Age: 66
End: 2021-12-06
Attending: RADIOLOGY
Payer: MEDICARE

## 2021-12-06 PROCEDURE — 77387 GUIDANCE FOR RADJ TX DLVR: CPT | Performed by: STUDENT IN AN ORGANIZED HEALTH CARE EDUCATION/TRAINING PROGRAM

## 2021-12-06 PROCEDURE — 77412 RADIATION TX DELIVERY LVL 3: CPT | Performed by: STUDENT IN AN ORGANIZED HEALTH CARE EDUCATION/TRAINING PROGRAM

## 2021-12-07 ENCOUNTER — APPOINTMENT (OUTPATIENT)
Dept: RADIATION ONCOLOGY | Facility: HOSPITAL | Age: 66
End: 2021-12-07
Attending: RADIOLOGY
Payer: MEDICARE

## 2021-12-07 PROCEDURE — G6017 INTRAFRACTION TRACK MOTION: HCPCS | Performed by: RADIOLOGY

## 2021-12-07 PROCEDURE — 77412 RADIATION TX DELIVERY LVL 3: CPT | Performed by: RADIOLOGY

## 2021-12-07 PROCEDURE — 77336 RADIATION PHYSICS CONSULT: CPT | Performed by: RADIOLOGY

## 2021-12-07 PROCEDURE — 77387 GUIDANCE FOR RADJ TX DLVR: CPT | Performed by: RADIOLOGY

## 2021-12-08 ENCOUNTER — APPOINTMENT (OUTPATIENT)
Dept: RADIATION ONCOLOGY | Facility: HOSPITAL | Age: 66
End: 2021-12-08
Attending: RADIOLOGY
Payer: MEDICARE

## 2021-12-08 PROCEDURE — 77412 RADIATION TX DELIVERY LVL 3: CPT | Performed by: STUDENT IN AN ORGANIZED HEALTH CARE EDUCATION/TRAINING PROGRAM

## 2021-12-08 PROCEDURE — 77387 GUIDANCE FOR RADJ TX DLVR: CPT | Performed by: STUDENT IN AN ORGANIZED HEALTH CARE EDUCATION/TRAINING PROGRAM

## 2021-12-08 PROCEDURE — 77427 RADIATION TX MANAGEMENT X5: CPT | Performed by: RADIOLOGY

## 2021-12-09 ENCOUNTER — APPOINTMENT (OUTPATIENT)
Dept: RADIATION ONCOLOGY | Facility: HOSPITAL | Age: 66
End: 2021-12-09
Attending: RADIOLOGY
Payer: MEDICARE

## 2021-12-09 PROCEDURE — 77387 GUIDANCE FOR RADJ TX DLVR: CPT | Performed by: STUDENT IN AN ORGANIZED HEALTH CARE EDUCATION/TRAINING PROGRAM

## 2021-12-09 PROCEDURE — 77412 RADIATION TX DELIVERY LVL 3: CPT | Performed by: STUDENT IN AN ORGANIZED HEALTH CARE EDUCATION/TRAINING PROGRAM

## 2021-12-10 ENCOUNTER — APPOINTMENT (OUTPATIENT)
Dept: RADIATION ONCOLOGY | Facility: HOSPITAL | Age: 66
End: 2021-12-10
Attending: RADIOLOGY
Payer: MEDICARE

## 2021-12-10 PROCEDURE — 77387 GUIDANCE FOR RADJ TX DLVR: CPT | Performed by: RADIOLOGY

## 2021-12-10 PROCEDURE — 77412 RADIATION TX DELIVERY LVL 3: CPT | Performed by: RADIOLOGY

## 2021-12-10 PROCEDURE — G6017 INTRAFRACTION TRACK MOTION: HCPCS | Performed by: RADIOLOGY

## 2021-12-13 ENCOUNTER — APPOINTMENT (OUTPATIENT)
Dept: RADIATION ONCOLOGY | Facility: HOSPITAL | Age: 66
End: 2021-12-13
Attending: RADIOLOGY
Payer: MEDICARE

## 2021-12-13 PROCEDURE — 77387 GUIDANCE FOR RADJ TX DLVR: CPT | Performed by: STUDENT IN AN ORGANIZED HEALTH CARE EDUCATION/TRAINING PROGRAM

## 2021-12-13 PROCEDURE — 77412 RADIATION TX DELIVERY LVL 3: CPT | Performed by: STUDENT IN AN ORGANIZED HEALTH CARE EDUCATION/TRAINING PROGRAM

## 2021-12-14 ENCOUNTER — APPOINTMENT (OUTPATIENT)
Dept: RADIATION ONCOLOGY | Facility: HOSPITAL | Age: 66
End: 2021-12-14
Attending: RADIOLOGY
Payer: MEDICARE

## 2021-12-14 PROCEDURE — 77412 RADIATION TX DELIVERY LVL 3: CPT | Performed by: RADIOLOGY

## 2021-12-14 PROCEDURE — 77387 GUIDANCE FOR RADJ TX DLVR: CPT | Performed by: RADIOLOGY

## 2021-12-14 PROCEDURE — 77336 RADIATION PHYSICS CONSULT: CPT | Performed by: RADIOLOGY

## 2021-12-16 ENCOUNTER — APPOINTMENT (OUTPATIENT)
Dept: RADIATION ONCOLOGY | Facility: HOSPITAL | Age: 66
End: 2021-12-16
Payer: MEDICARE

## 2022-01-27 ENCOUNTER — TELEMEDICINE (OUTPATIENT)
Dept: RADIATION ONCOLOGY | Facility: HOSPITAL | Age: 67
End: 2022-01-27
Attending: RADIOLOGY

## 2022-01-27 DIAGNOSIS — C50.412 MALIGNANT NEOPLASM OF UPPER-OUTER QUADRANT OF LEFT BREAST IN FEMALE, ESTROGEN RECEPTOR POSITIVE (HCC): Primary | ICD-10-CM

## 2022-01-27 DIAGNOSIS — Z17.0 MALIGNANT NEOPLASM OF UPPER-OUTER QUADRANT OF LEFT BREAST IN FEMALE, ESTROGEN RECEPTOR POSITIVE (HCC): Primary | ICD-10-CM

## 2022-01-27 PROCEDURE — 99024 POSTOP FOLLOW-UP VISIT: CPT | Performed by: RADIOLOGY

## 2022-01-27 NOTE — PROGRESS NOTES
Virtual Brief Visit    Patient is located in the following state in which I hold an active license [place]      Assessment/Plan:  The patient is 6 weeks status post radiation therapy for left breast carcinoma  She states she had some desquamation post treatment however this is completely healed  She does continue to have seroma without much change  We discussed breast massage  She will return for follow-up visit in 6 months  Problem List Items Addressed This Visit        Other    Malignant neoplasm of upper-outer quadrant of left breast in female, estrogen receptor positive ([facility] [address] ) - Primary          Recent Visits  No visits were found meeting these conditions  Showing recent visits within past 7 days and meeting all other requirements  Future Appointments  No visits were found meeting these conditions    Showing future appointments within next 150 days and meeting all other requirements         I spent 15 minutes directly with the patient during this visit

## 2022-02-08 DIAGNOSIS — C50.412 MALIGNANT NEOPLASM OF UPPER-OUTER QUADRANT OF LEFT BREAST IN FEMALE, ESTROGEN RECEPTOR POSITIVE (HCC): ICD-10-CM

## 2022-02-08 DIAGNOSIS — Z17.0 MALIGNANT NEOPLASM OF UPPER-OUTER QUADRANT OF LEFT BREAST IN FEMALE, ESTROGEN RECEPTOR POSITIVE (HCC): ICD-10-CM

## 2022-02-08 RX ORDER — ANASTROZOLE 1 MG/1
TABLET ORAL
Qty: 90 TABLET | Refills: 1 | Status: SHIPPED | OUTPATIENT
Start: 2022-02-08 | End: 2022-08-08

## 2022-03-23 ENCOUNTER — OFFICE VISIT (OUTPATIENT)
Dept: HEMATOLOGY ONCOLOGY | Facility: CLINIC | Age: 67
End: 2022-03-23
Payer: MEDICARE

## 2022-03-23 ENCOUNTER — OFFICE VISIT (OUTPATIENT)
Dept: SURGICAL ONCOLOGY | Facility: CLINIC | Age: 67
End: 2022-03-23
Payer: MEDICARE

## 2022-03-23 VITALS
RESPIRATION RATE: 17 BRPM | BODY MASS INDEX: 25.44 KG/M2 | SYSTOLIC BLOOD PRESSURE: 166 MMHG | WEIGHT: 149 LBS | TEMPERATURE: 98.9 F | OXYGEN SATURATION: 98 % | HEART RATE: 69 BPM | DIASTOLIC BLOOD PRESSURE: 92 MMHG | HEIGHT: 64 IN

## 2022-03-23 VITALS
HEIGHT: 64 IN | BODY MASS INDEX: 25.27 KG/M2 | WEIGHT: 148 LBS | RESPIRATION RATE: 18 BRPM | SYSTOLIC BLOOD PRESSURE: 148 MMHG | TEMPERATURE: 98.2 F | DIASTOLIC BLOOD PRESSURE: 78 MMHG | HEART RATE: 68 BPM | OXYGEN SATURATION: 98 %

## 2022-03-23 DIAGNOSIS — C50.412 MALIGNANT NEOPLASM OF UPPER-OUTER QUADRANT OF LEFT BREAST IN FEMALE, ESTROGEN RECEPTOR POSITIVE (HCC): Primary | ICD-10-CM

## 2022-03-23 DIAGNOSIS — Z92.3 S/P RADIATION THERAPY: ICD-10-CM

## 2022-03-23 DIAGNOSIS — Z79.811 USE OF ANASTROZOLE: ICD-10-CM

## 2022-03-23 DIAGNOSIS — Z17.0 MALIGNANT NEOPLASM OF UPPER-OUTER QUADRANT OF LEFT BREAST IN FEMALE, ESTROGEN RECEPTOR POSITIVE (HCC): Primary | ICD-10-CM

## 2022-03-23 DIAGNOSIS — N64.89 SEROMA OF BREAST: ICD-10-CM

## 2022-03-23 PROCEDURE — 99214 OFFICE O/P EST MOD 30 MIN: CPT | Performed by: SURGERY

## 2022-03-23 PROCEDURE — 99214 OFFICE O/P EST MOD 30 MIN: CPT | Performed by: INTERNAL MEDICINE

## 2022-03-23 PROCEDURE — 76642 ULTRASOUND BREAST LIMITED: CPT | Performed by: SURGERY

## 2022-03-23 PROCEDURE — 19000 PUNCTURE ASPIR CYST BREAST: CPT | Performed by: SURGERY

## 2022-03-23 PROCEDURE — 76942 ECHO GUIDE FOR BIOPSY: CPT | Performed by: SURGERY

## 2022-03-23 NOTE — PROGRESS NOTES
Hematology / Oncology Outpatient Follow Up Note    Connie Degree 78 y o  female BSU:[id number]         Date:  3/23/2022    Assessment / Plan:    A 57-year-old postmenopausal woman with stage I B left breast cancer, grade 1, ER 90% positive, MO negative, HER2 fish negative disease  She underwent lumpectomy and sentinel lymph node biopsy, resulting in LEO  She had 1 positive lymph node with micrometastasis   Her Oncotype DX recurrence score 14  She is currently on adjuvant hormonal therapy with anastrozole with reasonable tolerance  She has typical musculoskeletal symptoms  Clinically, she has no evidence recurrent disease  I recommended her to continue anastrozole 1 mg once a day  She is in agreement with my recommendation  I will see her again in a year for routine follow-up           Subjective:      HPI:    A 57-year-old postmenopausal woman who was found to have radiographic abnormality in her right breast based on a screening mammography  Therefore, she underwent right breast biopsy in July 12, 2021, which showed invasive ductal carcinoma, grade 1  This was ER 90% positive, MO negative, HER2 2+ disease  HER2 fish was negative for gene application with ratio of 1 0  She subsequent underwent lumpectomy and sentinel lymph node biopsy by Dr Philly Gaspar in August 26, 2021  She had 9 x 8 x 5 mm of invasive ductal carcinoma, grade 1  Lymphovascular invasion was present  1/3 sentinel lymph nodes or had micrometastasis measuring 1 mm  There was no evidence of extranodal extension  She had intraoperative radiation therapy at the time of lumpectomy  She presents today to discuss adjuvant treatment options  She still has some discomfort in her left breast   Otherwise, she feels well  She denied any bone pain  She has no respiratory symptoms  Her weight is stable  Her performance status is normal   She has no family history of breast or ovarian cancer    She is a lifetime never smoker            Interval History:   A 78year-old postmenopausal woman with stage I B left breast cancer, grade 1, ER 90% positive, AK negative, HER2 fish negative disease  She underwent lumpectomy and sentinel lymph node biopsy, resulting in LEO  She had 1 positive lymph node with micrometastasis  Her Oncotype DX recurrence score was 14  Therefore, adjuvant chemotherapy was not indicated  since October 2021, she has been on adjuvant hormonal therapy with anastrozole  She presents today for routine follow-up  She does not have any complaint of hot flashes  However, she has mild to moderate musculoskeletal symptoms  She denied any respiratory symptoms  Her weight is stable  She has seroma formation for which she recently had a aspiration  Her performance status is normal            Objective:      Primary Diagnosis:       Left breast cancer, stage I B ( pT1b, pN1a (mi), M0) grade 1, ER 90% positive, AK negative, HER2 fish negative disease  Oncotype DX recurrence score 14     Diagnosed in August 2021      Cancer Staging:  Cancer Staging  Malignant neoplasm of upper-outer quadrant of left breast in female, estrogen receptor positive ([facility] [address] )  Staging form: Breast, AJCC 8th Edition  - Clinical: Stage IA (cT1a, cN0, cM0, G1, ER+, AK-, HER2-) - Signed by Duncan Marshall MD on 8/5/2021  Stage prefix: Initial diagnosis  Method of lymph node assessment: Clinical  Histologic grading system: 3 grade system  - Pathologic: Stage IA (pT1b, pN1mi(sn), cM0, G1, ER+, AK-, HER2-) - Signed by Duncan Marshall MD on 9/9/2021  Stage prefix: Initial diagnosis  Method of lymph node assessment: [place] lymph node biopsy  Histologic grading system: 3 grade system           Previous Hematologic/ Oncologic Treatment:            Current Hematologic/ Oncologic Treatment:       Adjuvant hormonal therapy with anastrozole since October 2021      Disease Status:        LEO status post lumpectomy and sentinel lymph node biopsy      Test Results:     Pathology:       9 x 8 x 5 mm of invasive ductal carcinoma, grade 1  Lymphovascular invasion was present  1/3 sentinel lymph nodes had positive micrometastasis, measuring 1 mm with no extranodal extension  ER 90% positive, RI negative, HER2 2+ disease  HER2 fish was negative for gene application with ratio of 1 0  Oncotype DX recurrence score 14  Stage I B ( pT1b, pN1 ( mi), M0)     Radiology:           Laboratory:      see below      Physical Exam:        General Appearance:    Alert, oriented          Eyes:    PERRL   Ears:    Normal external ear canals, both ears   Nose:   Nares normal, septum midline   Throat:   Mucosa moist  Pharynx without injection  Neck:   Supple         Lungs:     Clear to auscultation bilaterally   Chest Wall:    No tenderness or deformity    Heart:    Regular rate and rhythm         Abdomen:     Soft, non-tender, bowel sounds +, no organomegaly               Extremities:   Extremities no cyanosis or edema         Skin:   no rash or icterus  Lymph nodes:   Cervical, supraclavicular, and axillary nodes normal   Neurologic:   CNII-XII intact, normal strength, sensation and reflexes     Throughout             Breast exam:     Lumpectomy scar at 3:00 a m  position in her left breast with no palpable abnormality  Right breast exam is negative               ROS: Review of Systems   All other systems reviewed and are negative  Imaging: No results found        Labs:   Lab Results   Component Value Date    WBC 6 35 11/14/2021    HGB 12 7 11/14/2021    HCT 39 0 11/14/2021    MCV 96 11/14/2021     11/14/2021     Lab Results   Component Value Date    K 3 8 08/14/2021     08/14/2021    CO2 25 08/14/2021    BUN 12 08/14/2021    CREATININE 0 73 08/14/2021    GLUF 90 08/14/2021    CALCIUM 8 8 08/14/2021    AST 26 08/14/2021    ALT 44 08/14/2021    ALKPHOS 77 08/14/2021    EGFR 86 08/14/2021           Current Medications: Reviewed  Allergies: Reviewed  PMH/FH/SH:  Reviewed      Vital Sign:    Body surface area is 1 72 meters squared      Wt Readings from Last 3 Encounters:   03/23/22 67 1 kg (148 lb)   03/23/22 67 6 kg (149 lb)   11/04/21 68 kg (150 lb)        Temp Readings from Last 3 Encounters:   03/23/22 98 2 °F (36 8 °C) (Tympanic Core)   03/23/22 98 9 °F (37 2 °C) (Probe)   11/04/21 98 1 °F (36 7 °C)        BP Readings from Last 3 Encounters:   03/23/22 148/78   03/23/22 166/92   11/04/21 142/98         Pulse Readings from Last 3 Encounters:   03/23/22 68   03/23/22 69   11/04/21 74     @LASTSAO2(3)@

## 2022-03-23 NOTE — PROGRESS NOTES
Surgical Oncology Follow Up       [facility] SURGICAL ONCOLOGY [facility]  [employer] [place] [address]    Chava Bile  1955  [id number]  [address] [facility] SURGICAL ONCOLOGY [employer]  [address] [address]    No chief complaint on file  Assessment/Plan   Diagnoses and all orders for this visit:    Malignant neoplasm of upper-outer quadrant of left breast in female, estrogen receptor positive ([facility] [address] )  -     Mammo diagnostic bilateral w 3d & cad; Future    Seroma of breast    S/P radiation therapy    Use of anastrozole        Advance Care Planning/Advance Directives:  Discussed disease status, cancer treatment plans and/or cancer treatment goals with the patient  Oncology History:    Oncology History   Malignant neoplasm of upper-outer quadrant of left breast in female, estrogen receptor positive ([facility] [address] )   7/12/2021 Initial Diagnosis    Malignant neoplasm of upper-outer quadrant of left breast in female, estrogen receptor positive ([facility] [address] )     7/12/2021 Biopsy    A  Breast, left at 02:00 o'clock 7 cm from nipple, ultrasound-guided biopsy (3 passes):  -  Invasive breast carcinoma of no special type (ductal NST/invasive ductal carcinoma)  - grade 1  - ER 95%, ID 0%, HER2 2+, equivocal by IHC but negative on the fish analysis    B  Breast, left at 02:00 o'clock 3 cm from nipple, ultrasound-guided biopsy (2 passes):  -  Benign breast parenchyma with fibrocystic changes including apocrine metaplasia, usual ductal hyperplasia, bold all and microcyst formation with a prominent sclerosed and cystic nodule, cannot exclude old granuloma versus sclerosed cyst   -  Negative for dysplasia or carcinoma        8/5/2021 -  Cancer Staged    Staging form: Breast, AJCC 8th Edition  - Clinical: Stage IA (cT1a, cN0, cM0, G1, ER+, ID-, HER2-) - Signed by Sarita Zepeda MD on 8/5/2021  Stage prefix: Initial diagnosis  Method of lymph node assessment: Clinical  Histologic grading system: 3 grade system       8/26/2021 Surgery    Left breast lumpectomy with SLNB  Surgeon: Dr Myrna Torres  Left breast, lumpectomy:  - Invasive breast carcinoma of no special type (ductal NST/invasive ductal carcinoma)  - Ductal carcinoma in situ (DCIS)  - All margins are negative for carcinoma or DCIS  B  Left breast sentinel lymph node, excision:  - One of three lymph nodes with micrometastases (pN1mi)  Note: Small focus (1 mm) metastatic carcinoma present in permanent section only  C  Left breast, new superior margin, re-excision:  - Benign fibroadipose tissue   - Negative for malignancy  D  Left breast, new medial margin, re-excision:  - Benign breast parenchyma and fibroadipose tissue   - Negative for malignancy  8/26/2021 -  Radiation    Treatments not in [address])  Field Numbers [employer] Treatment Site Starting  Ending  Elapsed  Fraction Total  Overlap Site Overlap         Date Date Days Dose Dose   Dose   IORT  50KVp  Left breast  8/26/21 8/26/21 2000 2000 9/9/2021 -  Cancer Staged    Staging form: Breast, AJCC 8th Edition  - Pathologic: Stage IA (pT1b, pN1mi(sn), cM0, G1, ER+, MA-, HER2-) - Signed by Mark Anthony Barreto MD on 9/9/2021  Stage prefix: Initial diagnosis  Method of lymph node assessment: [place] lymph node biopsy  Histologic grading system: 3 grade system       11/9/2021 - 12/14/2021 Radiation    BH L BREAST 6X 25 / 25 200 0 5,000 35      Treatment Dates:  11/9/2021 - 12/14/2021  History of Present Illness: breast cancer f/u, completed radiation, on anastrazole with se as noted in ros, large lump axilla  -Interval History:as noted    Review of Systems:  Review of Systems   Constitutional: Negative  Negative for appetite change and fever  Eyes: Negative  Respiratory: Negative for shortness of breath  Cardiovascular: Negative  Gastrointestinal: Negative  Endocrine: Negative      Genitourinary: Negative  Musculoskeletal: Positive for arthralgias  Negative for myalgias  Skin: Negative  Allergic/Immunologic: Negative  Neurological: Negative  Hematological: Negative  Negative for adenopathy  Does not bruise/bleed easily  Psychiatric/Behavioral: Positive for dysphoric mood  Patient Active Problem List   Diagnosis    Fibrocystic breast disease    Screening mammogram, encounter for    Closed nondisplaced transverse fracture of right patella    Numbness and tingling of left upper and lower extremity    Essential hypertension    TIA (transient ischemic attack)    Abnormal mammogram    Malignant neoplasm of upper-outer quadrant of left breast in female, estrogen receptor positive ([facility] [address] )    Hyperlipidemia    Anxiety    Arthritis    Seroma of breast    S/P radiation therapy    Use of anastrozole     Past Medical History:   Diagnosis Date    Acute URI     Anxiety     Arthritis     Atypical chest pain     Fibrocystic breast disease     Hypercholesterolemia     Osteopenia     Palpitations     Psoriasis     Rib pain on left side     Viral conjunctivitis      Past Surgical History:   Procedure Laterality Date    BREAST BIOPSY      BREAST LUMPECTOMY Left 8/26/2021    Procedure: BREAST CHELLY LOCALIZED LUMPECTOMY;  Surgeon: Luís Roberts MD;  [facility];  Service: Surgical Oncology    COLONOSCOPY      FOOT SURGERY Left     cyst removal    INTRAOPERATIVE RADIATION THERAPY (IORT) Left 8/26/2021    Procedure: BREAST INTRAOPERATIVE RADIATION THERAPY (IORT) BY DR SCANLON; Surgeon: Luís Roberts MD;  [facility];  Service: Surgical Oncology    LYMPH NODE BIOPSY Left 8/26/2021    Procedure: BIOPSY LYMPH NODE SENTINEL (NUC MED INJECTION AT 0900);   Surgeon: Luís Roberts MD;  [facility];  Service: Surgical Oncology    TONSILLECTOMY      US BREAST NEEDLE LOC LEFT Left 8/19/2021    US GUIDANCE BREAST BIOPSY LEFT EACH ADDITIONAL Left 7/12/2021    US GUIDED BREAST BIOPSY LEFT COMPLETE Left 7/12/2021     Family History   Problem Relation Age of Onset    Skin cancer Father         age dx unk    [place] Lung cancer Maternal Grandfather         age dx unk     Skin cancer Mother     No Known Problems Daughter     No Known Problems Maternal Grandmother     No Known Problems Paternal Grandmother     No Known Problems Paternal Grandfather     No Known Problems Maternal Aunt     No Known Problems Maternal Aunt     No Known Problems Maternal Aunt     No Known Problems Cousin     No Known Problems Cousin     No Known Problems Paternal Aunt     No Known Problems Cousin     No Known Problems Cousin      Social History     Socioeconomic History    Marital status: /Civil Union     Spouse name: Not on file    Number of children: Not on file    Years of education: Not on file    Highest education level: Not on file   Occupational History    Not on file   Tobacco Use    Smoking status: Never Smoker    Smokeless tobacco: Never Used   Vaping Use    Vaping Use: Never used   Substance and Sexual Activity    Alcohol use: Yes     Comment: seldom    Drug use: Not Currently    Sexual activity: Not on file   Other Topics Concern    Not on file   Social History Narrative    Not on file     Social Determinants of Health     Financial Resource Strain: Not on file   Food Insecurity: Not on file   Transportation Needs: Not on file   Physical Activity: Not on file   Stress: Not on file   Social Connections: Not on file   Intimate Partner Violence: Not on file   Housing Stability: Not on file       Current Outpatient Medications:     anastrozole (ARIMIDEX) 1 mg tablet, TAKE 1 TABLET BY MOUTH EVERY DAY, Disp: 90 tablet, Rfl: 1    atorvastatin (LIPITOR) 20 mg tablet, 20 mg daily at bedtime , Disp: , Rfl:     Cholecalciferol (VITAMIN D-3 PO), Take 2,000 Int'l Units by mouth daily, Disp: , Rfl:     PARoxetine (PAXIL) 20 mg tablet, 20 mg every morning , Disp: , Rfl:    traMADol (ULTRAM) 50 mg tablet, Take 1 tablet (50 mg total) by mouth every 8 (eight) hours as needed for moderate pain (Patient not taking: Reported on 9/9/2021), Disp: 9 tablet, Rfl: 0    triamcinolone (KENALOG) 0 1 % ointment, Apply topically 2 (two) times a day Apply to affected area two (2) times daily, Disp: 400 g, Rfl: 1  No Known Allergies    The following portions of the patient's history were reviewed and updated as appropriate: allergies, current medications, past family history, past medical history, past social history, past surgical history and problem list         Vitals:    03/23/22 0929   BP: 166/92   Pulse: 69   Resp: 17   Temp: 98 9 °F (37 2 °C)   SpO2: 98%       Physical Exam  Constitutional:       General: She is not in acute distress  Appearance: Normal appearance  She is well-developed  HENT:      Head: Normocephalic and atraumatic  Cardiovascular:      Heart sounds: Normal heart sounds  Pulmonary:      Breath sounds: Normal breath sounds  Chest:   Breasts:      Right: No inverted nipple, mass, nipple discharge, skin change, tenderness, axillary adenopathy or supraclavicular adenopathy  Left: Swelling (large probable seroma) and skin change (lumpectomy scar, hyperpigmentation) present  No inverted nipple, mass, nipple discharge, tenderness, axillary adenopathy or supraclavicular adenopathy  Abdominal:      Palpations: Abdomen is soft  Lymphadenopathy:      Upper Body:      Right upper body: No supraclavicular, axillary or pectoral adenopathy  Left upper body: No supraclavicular, axillary or pectoral adenopathy  Neurological:      Mental Status: She is alert and oriented to person, place, and time     Psychiatric:         Mood and Affect: Mood normal            Results:  Labs:      Imaging        Breast Ultrasound     Date/Time 3/23/2022 9:56 AM     Performed by  Terell Mcmillan MD     Authorized by Terell Mcmillan MD      Procedure Details   Procedure Notes: Left breast scanned with attention to the palpable and visible mass far upper outer left breast and axilla  Multiloculated fluid collection present  Recommend aspiration given symptomatic nature  US Guided aspiration left axilla/breast     Date/Time 3/23/2022 9:56 AM     Performed by  Alen Gaona MD     Authorized by Alen Gaona MD      Universal Protocol   Consent: Verbal consent obtained  Patient identity confirmed: verbally with patient and provided demographic data        Local anesthesia used: yes     Anesthesia   Local anesthesia used: yes  Local Anesthetic: lidocaine 1% without epinephrine  Anesthetic total: 10 mL     Procedure Details   Procedure Notes: The left axillary region was prepped with betadine  1% lidocaine was used for local  The multiloculated seroma was aspirated under direct ultrasound guidance  Approximately 40ccs of old serosanginous fluid was obtained from the axilla  Small thickened seroma remains inferior in the breast                   Discussion/Summary:67 yo female s/p  Left breast conservation for a stage IA IDC ER+, HER2-  She had IORT followed by whole breast RT secondary to the micromet in the node  She is having side effects from her AI and will d/w oncology  The large mass on exam today was a multiloculated seroma of which the axillary component was aspirated today in the office  I advised her to continue massage and warm compresses to the breast  I will make arrangements for her mammogram in June and plan to see her again in six months or sooner should the need arise

## 2022-04-25 DIAGNOSIS — Z17.0 MALIGNANT NEOPLASM OF UPPER-OUTER QUADRANT OF LEFT BREAST IN FEMALE, ESTROGEN RECEPTOR POSITIVE (HCC): Primary | ICD-10-CM

## 2022-04-25 DIAGNOSIS — N64.89 SEROMA OF BREAST: ICD-10-CM

## 2022-04-25 DIAGNOSIS — C50.412 MALIGNANT NEOPLASM OF UPPER-OUTER QUADRANT OF LEFT BREAST IN FEMALE, ESTROGEN RECEPTOR POSITIVE (HCC): Primary | ICD-10-CM

## 2022-04-25 DIAGNOSIS — Z92.3 S/P RADIATION THERAPY: ICD-10-CM

## 2022-05-09 ENCOUNTER — EVALUATION (OUTPATIENT)
Dept: PHYSICAL THERAPY | Facility: CLINIC | Age: 67
End: 2022-05-09
Payer: MEDICARE

## 2022-05-09 DIAGNOSIS — R07.89 CHEST WALL PAIN FOLLOWING SURGERY: Primary | ICD-10-CM

## 2022-05-09 DIAGNOSIS — G89.18 CHEST WALL PAIN FOLLOWING SURGERY: Primary | ICD-10-CM

## 2022-05-09 PROCEDURE — 97110 THERAPEUTIC EXERCISES: CPT | Performed by: PHYSICAL THERAPIST

## 2022-05-09 PROCEDURE — 97162 PT EVAL MOD COMPLEX 30 MIN: CPT | Performed by: PHYSICAL THERAPIST

## 2022-05-09 NOTE — PROGRESS NOTES
PT Evaluation     Today's date: 2022  Patient name: Fabrizio Aguirre  : 1955  MRN: [id number]  Referring provider: Vielka Disla MD  Dx:   Encounter Diagnosis     ICD-10-CM    1  Chest pain, unspecified type  R07 9                   Assessment  Assessment details: Fabrizio Aguirre is a 78 y o  female with Chest wall pain, s/p L lumpectomy with radiation therapy and seroma aspiration  She presents with pain, decreased strength, decreased ROM, and scar tissue fibrosis  Due to these impairments, Patient has difficulty performing a/iadls, recreational activities and engaging in social activities  Patient's clinical presentation is consistent with their referring diagnosis  Patient would benefit from skilled physical therapy to address their aforementioned impairments, improve their level of function and to improve their overall quality of life  She has been given a home exercise program and is in agreement with the plan of care  Thank you for your referral   Impairments: abnormal or restricted ROM, activity intolerance, lacks appropriate home exercise program and pain with function  Other impairment: edema  Understanding of Dx/Px/POC: excellent  Goals  ST Goals - 2-4 weeks  1  Patient will report decreased pain with activity by at least 2 points within 4 weeks  2  Patient will improve ROM 5-10 degrees within 4 weeks  3  Patient will maintain her girth measurements with no sign of lymphedema for 4 weeks  4  Patient will perform IADLs without pain in 4 weeks  5  Patient will increase strength by 25% in 4 weeks    LT Goals - Discharge  1  Patient will improve FOTO score to maximum stated or greater by discharge  2  Patient will return to preferred recreational activity without significant pain increase by discharge   3    Patient will return to all work related activities without pain by discharge     Plan  Patient would benefit from: skilled physical therapy  Referral necessary: No  Planned therapy interventions: joint mobilization, manual therapy, massage, neuromuscular re-education, postural training, strengthening, stretching, therapeutic activities, therapeutic exercise, home exercise program, graded exercise and graded activity  Frequency: 2x week  Duration in visits: 20  Duration in weeks: 20  Treatment plan discussed with: patient        Subjective Evaluation    History of Present Illness  Mechanism of injury: Patient reports that she had a lumpectomy on the L in 2021  She had a seroma which was aspirated but since that time she has had pain and tightness in the region  She continues to have a seroma in the area  She had intraoperative radiation however, she then had micrometastases to the lymph node therefore she had 25 treatments of additional radiation therapy  She is on anastrozole  She began on that in Sept and finished radiation in December  CC:  Pain, tightness and soreness in the L chest wall  Function:  She has pain when she tries to sleep on the side  She notices the pain when she rolls over  She also notes general body aches from the anastrozole  She is going to acupuncture for her foot and hand pain  She does try to take walks but it is weather dependent  She is RHD  She is retired  Recurrent probem    Quality of life: excellent    Pain  Current pain ratin  At best pain ratin  At worst pain ratin  Progression: no change    Hand dominance: right    Patient Goals  Patient goals for therapy: decreased edema, decreased pain and increased motion          Objective     Observations   Left Shoulder   Positive for adhesive scar, edema and incision  Additional Observation Details  Patient with palpable scar/chest wall fibrosis due to radiation therapy    Patient with ttp at the Pec minor and inframammary fold most likely related to radiation therapy as well as edema in the inferior breast       Active Range of Motion   Left Shoulder   Flexion: 150 degrees   Abduction: 148 degrees   External rotation BTH: C7   Internal rotation BTB: T6     Right Shoulder   Flexion: 170 degrees   Abduction: 170 degrees   External rotation BTH: T3   Internal rotation BTB: T5     Swelling   Left shoulder swelling details: Lymphedema MMTs                                             LEFT    palm                            17 5  wrist                            14 5  Wrist + 10 cm                18 5          Wrist + 16 cm                           21 5  Elbow                            23 2  Wrist + 36                28  Wrist + 40                                 30        Right shoulder swelling details: Lymphedema MMTs                                             RIGHT     palm                                  18   wrist                       14 8  Wrist + 10 cm                      19   Wrist + 16 cm                       22 8   Elbow                        24  Wrist + 36                       28 2   Wrist + 40            30                 Precautions:  BCA    Manuals 5/9            IASTM             (-) pressure IASTM                                       Neuro Re-Ed                          SBS nv                         TB Row/Ext                                                    Ther Ex                          Pulleys nv            Wall Slides flex/abd 3 x :20            Pec doorway st 5 x :20            Cane flex supine             Cane Hair cut                                       Ther Activity                                       Gait Training                                       Modalities

## 2022-05-18 ENCOUNTER — OFFICE VISIT (OUTPATIENT)
Dept: PHYSICAL THERAPY | Facility: CLINIC | Age: 67
End: 2022-05-18
Payer: MEDICARE

## 2022-05-18 DIAGNOSIS — R07.89 CHEST WALL PAIN FOLLOWING SURGERY: Primary | ICD-10-CM

## 2022-05-18 DIAGNOSIS — G89.18 CHEST WALL PAIN FOLLOWING SURGERY: Primary | ICD-10-CM

## 2022-05-18 PROCEDURE — 97110 THERAPEUTIC EXERCISES: CPT | Performed by: PHYSICAL THERAPIST

## 2022-05-18 PROCEDURE — 97112 NEUROMUSCULAR REEDUCATION: CPT | Performed by: PHYSICAL THERAPIST

## 2022-05-18 PROCEDURE — 97140 MANUAL THERAPY 1/> REGIONS: CPT | Performed by: PHYSICAL THERAPIST

## 2022-05-18 NOTE — PROGRESS NOTES
Daily Note     Today's date: 2022  Patient name: Dasha Arzate  : 1955  MRN: [id number]  Referring provider: Sayda Elliott MD  Dx:   Encounter Diagnosis     ICD-10-CM    1  Chest wall pain following surgery  R07 89     G89 18                   Subjective: Patient reports that she has less soreness in the axilla/pec region  Notes that abd stretch is most difficult  Objective: See treatment diary below      Assessment: Tolerated treatment well  Patient would benefit from continued PT      Plan: Continue per plan of care        Precautions:  BCA    Manuals            IASTM             (-) pressure IASTM                                       Neuro Re-Ed                          SBS nv 10 x :05                        TB Row  YTB x 10                                                  Ther Ex                          Pulleys nv 5'           Wall Slides flex/abd 3 x :20 5 x :10           Pec doorway st 5 x :20 5 x :20           Cane flex supine  10 x :10           Cane Hair cut  10 x :10                                     Ther Activity                                       Gait Training                                       Modalities

## 2022-05-24 ENCOUNTER — OFFICE VISIT (OUTPATIENT)
Dept: PHYSICAL THERAPY | Facility: CLINIC | Age: 67
End: 2022-05-24
Payer: MEDICARE

## 2022-05-24 DIAGNOSIS — R07.89 CHEST WALL PAIN FOLLOWING SURGERY: Primary | ICD-10-CM

## 2022-05-24 DIAGNOSIS — G89.18 CHEST WALL PAIN FOLLOWING SURGERY: Primary | ICD-10-CM

## 2022-05-24 PROCEDURE — 97110 THERAPEUTIC EXERCISES: CPT | Performed by: PHYSICAL THERAPIST

## 2022-05-24 PROCEDURE — 97140 MANUAL THERAPY 1/> REGIONS: CPT | Performed by: PHYSICAL THERAPIST

## 2022-05-24 NOTE — PROGRESS NOTES
Daily Note     Today's date: 2022  Patient name: Christopher Beaulieu  : 1955  MRN: [id number]  Referring provider: Chapis Randall MD  Dx:   Encounter Diagnosis     ICD-10-CM    1  Chest wall pain following surgery  R07 89     G89 18                   Subjective: Patient reports that she is felling well  She does continue to have pain at the sternum at the costochondral region on the L  She does note less firmness and swelling at the area of the seroma  Objective: See treatment diary below      Assessment: Tolerated treatment well  Patient would benefit from continued PT      Plan: Continue per plan of care        Precautions:  BCA    Manuals           IASTM             (-) pressure IASTM             PROM of horiz abd   8          pec minor st on towel roll   5          Neuro Re-Ed                          SBS nv 10 x :05 hep                       TB Row  YTB x 10 YTBx20                                                 Ther Ex                          Pulleys nv 5' 5          Wall Slides flex/abd 3 x :20 5 x :10 10 x :10          Pec doorway st 5 x :20 5 x :20 5x:20          Cane flex supine  10 x :10 10 x :10          Cane Hair cut  10 x :10 10 x :10                                    Ther Activity                                       Gait Training                                       Modalities

## 2022-05-26 ENCOUNTER — OFFICE VISIT (OUTPATIENT)
Dept: PHYSICAL THERAPY | Facility: CLINIC | Age: 67
End: 2022-05-26
Payer: MEDICARE

## 2022-05-26 DIAGNOSIS — R07.89 CHEST WALL PAIN FOLLOWING SURGERY: Primary | ICD-10-CM

## 2022-05-26 DIAGNOSIS — G89.18 CHEST WALL PAIN FOLLOWING SURGERY: Primary | ICD-10-CM

## 2022-05-26 PROCEDURE — 97530 THERAPEUTIC ACTIVITIES: CPT | Performed by: PHYSICAL THERAPIST

## 2022-05-26 PROCEDURE — 97110 THERAPEUTIC EXERCISES: CPT | Performed by: PHYSICAL THERAPIST

## 2022-05-26 NOTE — PROGRESS NOTES
Daily Note     Today's date: 2022  Patient name: Rodney Bowen  : 1955  MRN: [id number]  Referring provider: Asia Randall MD  Dx:   Encounter Diagnosis     ICD-10-CM    1  Chest wall pain following surgery  R07 89     G89 18                   Subjective: Patient reports no pain in the sternal/rib area today  She notes no swelling in the seroma region with exercise  Objective: See treatment diary below      Assessment: Tolerated treatment well  Patient would benefit from continued PT      Plan: Continue per plan of care        Precautions:  BCA    Manuals          IASTM             (-) pressure IASTM             PROM of horiz abd   8 8'         pec minor st on towel roll   5          Neuro Re-Ed                          SBS nv 10 x :05 hep                       TB Row/ B ER  YTB x 10 YTBx20 YTB x 20                                                Ther Ex                          Pulleys nv 5' 5 5'         Wall Slides flex/abd 3 x :20 5 x :10 10 x :10 10 x :10         Pec doorway st 5 x :20 5 x :20 5x:20 5 x :20         Cane flex supine  10 x :10 10 x :10 10 x :10         Cane Hair cut  10 x :10 10 x :10 5 x :20                                   Ther Activity             Bicep curl    4# x 20         Rows    4# x 20         Tricep pushback    4# x 20                                   Modalities

## 2022-05-31 ENCOUNTER — APPOINTMENT (OUTPATIENT)
Dept: PHYSICAL THERAPY | Facility: CLINIC | Age: 67
End: 2022-05-31
Payer: MEDICARE

## 2022-06-02 ENCOUNTER — OFFICE VISIT (OUTPATIENT)
Dept: PHYSICAL THERAPY | Facility: CLINIC | Age: 67
End: 2022-06-02
Payer: MEDICARE

## 2022-06-02 DIAGNOSIS — R07.89 CHEST WALL PAIN FOLLOWING SURGERY: Primary | ICD-10-CM

## 2022-06-02 DIAGNOSIS — G89.18 CHEST WALL PAIN FOLLOWING SURGERY: Primary | ICD-10-CM

## 2022-06-02 PROCEDURE — 97140 MANUAL THERAPY 1/> REGIONS: CPT | Performed by: PHYSICAL THERAPIST

## 2022-06-02 PROCEDURE — 97110 THERAPEUTIC EXERCISES: CPT | Performed by: PHYSICAL THERAPIST

## 2022-06-02 PROCEDURE — 97530 THERAPEUTIC ACTIVITIES: CPT | Performed by: PHYSICAL THERAPIST

## 2022-06-02 NOTE — PROGRESS NOTES
Daily Note     Today's date: 2022  Patient name: Mary Cortez  : 1955  MRN: [id number]  Referring provider: Lesa Mckeon MD  Dx:   Encounter Diagnosis     ICD-10-CM    1  Chest wall pain following surgery  R07 89     G89 18                   Subjective:       Objective: See treatment diary below      Assessment: Tolerated treatment well  Patient would benefit from continued PT      Plan: Continue per plan of care        Precautions:  BCA    Manuals         IASTM             (-) pressure IASTM             PROM s/l ABD   8 8'         pec minor st on towel roll   5          Neuro Re-Ed                          SBS nv 10 x :05 hep                       TB Row/ B ER  YTB x 10 YTBx20 YTB x 20 RTB x 20                                               Ther Ex                          Pulleys nv 5' 5 5' 5        Wall Slides flex/abd 3 x :20 5 x :10 10 x :10 10 x :10 10 x :10        Pec doorway st 5 x :20 5 x :20 5x:20 5 x :20 5 x :20        Cane flex supine  10 x :10 10 x :10 10 x :10 10 x :10        Cane Hair cut  10 x :10 10 x :10 5 x :20 5 x :20                                  Ther Activity             Bicep curl    4# x 20 4# x 20        Rows    4# x 20 4# x 20        Tricep pushback    4# x 20 4# x 20        scaption             Chest press             serratus

## 2022-06-13 ENCOUNTER — OFFICE VISIT (OUTPATIENT)
Dept: PHYSICAL THERAPY | Facility: CLINIC | Age: 67
End: 2022-06-13
Payer: MEDICARE

## 2022-06-13 DIAGNOSIS — R07.89 CHEST WALL PAIN FOLLOWING SURGERY: Primary | ICD-10-CM

## 2022-06-13 DIAGNOSIS — G89.18 CHEST WALL PAIN FOLLOWING SURGERY: Primary | ICD-10-CM

## 2022-06-13 PROCEDURE — 97530 THERAPEUTIC ACTIVITIES: CPT | Performed by: PHYSICAL THERAPIST

## 2022-06-13 PROCEDURE — 97110 THERAPEUTIC EXERCISES: CPT | Performed by: PHYSICAL THERAPIST

## 2022-06-15 ENCOUNTER — APPOINTMENT (OUTPATIENT)
Dept: PHYSICAL THERAPY | Facility: CLINIC | Age: 67
End: 2022-06-15
Payer: MEDICARE

## 2022-06-20 ENCOUNTER — HOSPITAL ENCOUNTER (OUTPATIENT)
Dept: MAMMOGRAPHY | Facility: CLINIC | Age: 67
Discharge: HOME | End: 2022-06-20
Payer: MEDICARE

## 2022-06-20 VITALS — WEIGHT: 148 LBS | BODY MASS INDEX: 25.27 KG/M2 | HEIGHT: 64 IN

## 2022-06-20 DIAGNOSIS — Z17.0 MALIGNANT NEOPLASM OF UPPER-OUTER QUADRANT OF LEFT BREAST IN FEMALE, ESTROGEN RECEPTOR POSITIVE (HCC): ICD-10-CM

## 2022-06-20 DIAGNOSIS — C50.412 MALIGNANT NEOPLASM OF UPPER-OUTER QUADRANT OF LEFT BREAST IN FEMALE, ESTROGEN RECEPTOR POSITIVE (HCC): ICD-10-CM

## 2022-06-20 PROCEDURE — G0279 TOMOSYNTHESIS, MAMMO: HCPCS

## 2022-06-20 PROCEDURE — 77066 DX MAMMO INCL CAD BI: CPT

## 2022-06-21 ENCOUNTER — OFFICE VISIT (OUTPATIENT)
Dept: PHYSICAL THERAPY | Facility: CLINIC | Age: 67
End: 2022-06-21
Payer: MEDICARE

## 2022-06-21 DIAGNOSIS — G89.18 CHEST WALL PAIN FOLLOWING SURGERY: Primary | ICD-10-CM

## 2022-06-21 DIAGNOSIS — R07.89 CHEST WALL PAIN FOLLOWING SURGERY: Primary | ICD-10-CM

## 2022-06-21 PROCEDURE — 97110 THERAPEUTIC EXERCISES: CPT | Performed by: PHYSICAL THERAPIST

## 2022-06-21 PROCEDURE — 97530 THERAPEUTIC ACTIVITIES: CPT | Performed by: PHYSICAL THERAPIST

## 2022-06-21 NOTE — PROGRESS NOTES
PT Evaluation and Discharge    Today's date: 2022  Patient name: Erich De  : 1955  MRN: [id number]  Referring provider: Dottie Seals MD  Dx:   Encounter Diagnosis     ICD-10-CM    1  Chest pain, unspecified type  R07 9                   Assessment  22: Kevin Mishra has been attending physical therapy for L chest wall pain s/p lumpectomy with radiation therapy  She has made excellent progress in decreasing pain and edema and increasing her strength and overall function  She is discontinuing formal treatment at this time and will continue exercising on her own  Thank you for your referral      Assessment details: Erich De is a 78 y o  female with Chest wall pain, s/p L lumpectomy with radiation therapy and seroma aspiration  She presents with pain, decreased strength, decreased ROM, and scar tissue fibrosis  Due to these impairments, Patient has difficulty performing a/iadls, recreational activities and engaging in social activities  Patient's clinical presentation is consistent with their referring diagnosis  Patient would benefit from skilled physical therapy to address their aforementioned impairments, improve their level of function and to improve their overall quality of life  She has been given a home exercise program and is in agreement with the plan of care  Thank you for your referral   Impairments: abnormal or restricted ROM, activity intolerance, lacks appropriate home exercise program and pain with function  Other impairment: edema  Understanding of Dx/Px/POC: excellent  Goals  ST Goals - 2-4 weeks  1  Patient will report decreased pain with activity by at least 2 points within 4 weeks  2  Patient will improve ROM 5-10 degrees within 4 weeks  3  Patient will maintain her girth measurements with no sign of lymphedema for 4 weeks  4  Patient will perform IADLs without pain in 4 weeks  5  Patient will increase strength by 25% in 4 weeks    LT Goals - Discharge  1  Patient will improve FOTO score to maximum stated or greater by discharge  2  Patient will return to preferred recreational activity without significant pain increase by discharge   3  Patient will return to all work related activities without pain by discharge     Plan  Patient would benefit from: skilled physical therapy  Referral necessary: No  Planned therapy interventions: joint mobilization, manual therapy, massage, neuromuscular re-education, postural training, strengthening, stretching, therapeutic activities, therapeutic exercise, home exercise program, graded exercise and graded activity  Frequency: 2x week  Duration in visits: 20  Duration in weeks: 20  Treatment plan discussed with: patient        Subjective Evaluation    History of Present Illness  Mechanism of injury: Patient reports that she had a lumpectomy on the L in 2021  She had a seroma which was aspirated but since that time she has had pain and tightness in the region  She continues to have a seroma in the area  She had intraoperative radiation however, she then had micrometastases to the lymph node therefore she had 25 treatments of additional radiation therapy  She is on anastrozole  She began on that in Sept and finished radiation in December  CC:  Pain, tightness and soreness in the L chest wall  Function:  She has pain when she tries to sleep on the side  She notices the pain when she rolls over  She also notes general body aches from the anastrozole  She is going to acupuncture for her foot and hand pain  She does try to take walks but it is weather dependent  She is RHD  She is retired              Recurrent probem    Quality of life: excellent    Pain  Current pain ratin  At best pain ratin  At worst pain ratin / 0  Progression: no change    Hand dominance: right    Patient Goals  Patient goals for therapy: decreased edema, decreased pain and increased motion          Objective     Observations Left Shoulder   Positive for adhesive scar, edema and incision  Additional Observation Details  Patient with palpable scar/chest wall fibrosis due to radiation therapy    Patient with ttp at the Pec minor and inframammary fold most likely related to radiation therapy as well as edema in the inferior breast       Active Range of Motion   Left Shoulder   Flexion: 150 degrees  /  160 degrees  Abduction: 148 degrees  / 160 degrees  External rotation BTH: C7   Internal rotation BTB: T6     Right Shoulder   Flexion: 170 degrees   Abduction: 170 degrees   External rotation BTH: T3   Internal rotation BTB: T5     Swelling   Left shoulder swelling details: Lymphedema MMTs                                             LEFT       palm                            17 5              17 5  wrist                            14 5               14 2  Wrist + 10 cm                18 5               19 1  Wrist + 16 cm                           21 5                21 5  Elbow                            23 2                23 2  Wrist + 36                28                   27 5  Wrist + 40                                 30                      Right shoulder swelling details: Lymphedema MMTs                                             RIGHT     palm                                  18   wrist                       14 8  Wrist + 10 cm                      19   Wrist + 16 cm                       22 8   Elbow                        24  Wrist + 36                       28 2   Wrist + 40            30

## 2022-06-21 NOTE — PROGRESS NOTES
Daily Note     Today's date: 2022  Patient name: Veronique Villatoro  : 1955  MRN: [id number]  Referring provider: Jose Lujan MD  Dx:   Encounter Diagnosis     ICD-10-CM    1  Chest wall pain following surgery  R07 89     G89 18                   Subjective: Patient reports that she is doing well  Objective: See treatment diary below      Assessment: Tolerated treatment well  Patient would benefit from continued PT      Plan: Continue per plan of care        Precautions:  BCA    Manuals       IASTM             (-) pressure IASTM             PROM s/l ABD   8 8'  5'       pec minor st on towel roll   5          Neuro Re-Ed                          SBS nv 10 x :05 hep                       TB Row/Ext/ B ER  YTB x 10 YTBx20 YTB x 20 RTB x 20 RTB x 20 RTB x 20                                             Ther Ex                          Pulleys nv 5' 5 5' 5 5' 5'      Wall Slides flex/abd 3 x :20 5 x :10 10 x :10 10 x :10 10 x :10 dc       Pec doorway st 5 x :20 5 x :20 5x:20 5 x :20 5 x :20 5 x :20 5 x :20      Cane flex supine  10 x :10 10 x :10 10 x :10 10 x :10 10 x :10 10 x :10      Cane Hair cut  10 x :10 10 x :10 5 x :20 5 x :20                                  Ther Activity             Bicep curl    4# x 20 4# x 20 4# 2 x 10 4# 2 x 10      Rows    4# x 20 4# x 20 4# 2 x 10 4# 2 x 10      Tricep pushback    4# x 20 4# x 20 4# 2 x 10 4# 2 x 10      scaption       2# 2 x 10      Chest press             serratus      4# x 20 4# x 20

## 2022-06-27 ENCOUNTER — APPOINTMENT (OUTPATIENT)
Dept: PHYSICAL THERAPY | Facility: CLINIC | Age: 67
End: 2022-06-27
Payer: MEDICARE

## 2022-06-29 ENCOUNTER — APPOINTMENT (OUTPATIENT)
Dept: PHYSICAL THERAPY | Facility: CLINIC | Age: 67
End: 2022-06-29
Payer: MEDICARE

## 2022-07-28 ENCOUNTER — CLINICAL SUPPORT (OUTPATIENT)
Dept: RADIATION ONCOLOGY | Facility: HOSPITAL | Age: 67
End: 2022-07-28
Attending: RADIOLOGY
Payer: MEDICARE

## 2022-07-28 VITALS
HEIGHT: 64 IN | TEMPERATURE: 98 F | OXYGEN SATURATION: 98 % | HEART RATE: 82 BPM | WEIGHT: 153.4 LBS | RESPIRATION RATE: 16 BRPM | DIASTOLIC BLOOD PRESSURE: 86 MMHG | SYSTOLIC BLOOD PRESSURE: 150 MMHG | BODY MASS INDEX: 26.19 KG/M2

## 2022-07-28 DIAGNOSIS — Z17.0 MALIGNANT NEOPLASM OF UPPER-OUTER QUADRANT OF LEFT BREAST IN FEMALE, ESTROGEN RECEPTOR POSITIVE (HCC): Primary | ICD-10-CM

## 2022-07-28 DIAGNOSIS — C50.412 MALIGNANT NEOPLASM OF UPPER-OUTER QUADRANT OF LEFT BREAST IN FEMALE, ESTROGEN RECEPTOR POSITIVE (HCC): Primary | ICD-10-CM

## 2022-07-28 PROCEDURE — 99211 OFF/OP EST MAY X REQ PHY/QHP: CPT | Performed by: RADIOLOGY

## 2022-07-28 PROCEDURE — 99214 OFFICE O/P EST MOD 30 MIN: CPT | Performed by: RADIOLOGY

## 2022-07-28 NOTE — PROGRESS NOTES
Follow-up - Radiation Oncology   Milana Mosquera 1955 78 y o  female [id number]      History of Present Illness   Cancer Staging  Malignant neoplasm of upper-outer quadrant of left breast in female, estrogen receptor positive ([facility] [address] )  Staging form: Breast, AJCC 8th Edition  - Clinical: Stage IA (cT1a, cN0, cM0, G1, ER+, SC-, HER2-) - Signed by Keith Villanueva MD on 8/5/2021  Stage prefix: Initial diagnosis  Method of lymph node assessment: Clinical  Histologic grading system: 3 grade system  - Pathologic: Stage IA (pT1b, pN1mi(sn), cM0, G1, ER+, SC-, HER2-) - Signed by Keith Villanueva MD on 9/9/2021  Stage prefix: Initial diagnosis  Method of lymph node assessment: [place] lymph node biopsy  Histologic grading system: 3 grade system          Interval History:  Milana Mosquera 1955 is a 78 y o  female  with clinical stage I a left breast carcinoma, ER positive/SC negative and HER2 negative  She was  interested in proceeding with breast conservation surgery  She underwent lumpectomy with IORT on 8/26/2021      On final pathology she was found to have a 9 millimeter invasive ductal carcinoma with no EIC   Her margins of resection were negative   She did however have 1 lymph node which contained a micro metastasis  Based on her micro metastasis in the lymph node, adjuvant radiation therapy to the left breast was recommended with high tangential beams to cover the lower axilla     She completed a course of radiation to the L breast on 12/14/2021   The pt was last seen in radiation on 01/27/22 via telemedicine visit      03/23/22 - Surg OncDaljit  Pt has large mass - multiloculated seroma - advised to continue with massage and warm compresses   Follow up in 6 months      03/23/22 - Hem OncGalileo  Pt remains on Anastrozole with reasonable tolerance  No evidence of recurrent disease  Follow up in 1 year     06/20/22 - Mammo diagnostic bilateral w 3d & cad  FINDINGS:   Bilateral  There are no suspicious masses, grouped microcalcifications or areas of unexplained architectural distortion   Small circumscribed nodules on the right, decreased size over time  New postoperative and post radiation changes on the left including a large 5 5 cm partially imaged axillary tail density most consistent with appearance of large postop seroma   As per chart, this was imaged and partially aspirated in March by Dr Cornelio Jaimes  IMPRESSION:   Expected postoperative and post radiation changes on the left   No suspicious findings on the right  ASSESSMENT/BI-RADS CATEGORY:  Overall: 2 - Benign     Upcoming:  10/26/22 - Surg Ltanya Bring  03/28/23 - Hem Onc, [facility]  06/20/23 - Mammo diagnostic bilateral w 3d & cad        Historical Information   Oncology History   Malignant neoplasm of upper-outer quadrant of left breast in female, estrogen receptor positive ([facility] [address] )   7/12/2021 Initial Diagnosis    Malignant neoplasm of upper-outer quadrant of left breast in female, estrogen receptor positive ([facility] [address] )     7/12/2021 Biopsy    A  Breast, left at 02:00 o'clock 7 cm from nipple, ultrasound-guided biopsy (3 passes):  -  Invasive breast carcinoma of no special type (ductal NST/invasive ductal carcinoma)  - grade 1  - ER 95%, NV 0%, HER2 2+, equivocal by IHC but negative on the fish analysis    B  Breast, left at 02:00 o'clock 3 cm from nipple, ultrasound-guided biopsy (2 passes):  -  Benign breast parenchyma with fibrocystic changes including apocrine metaplasia, usual ductal hyperplasia, bold all and microcyst formation with a prominent sclerosed and cystic nodule, cannot exclude old granuloma versus sclerosed cyst   -  Negative for dysplasia or carcinoma        8/5/2021 -  Cancer Staged    Staging form: Breast, AJCC 8th Edition  - Clinical: Stage IA (cT1a, cN0, cM0, G1, ER+, NV-, HER2-) - Signed by Stephanie Gil MD on 8/5/2021  Stage prefix: Initial diagnosis  Method of lymph node assessment: Clinical  Histologic grading system: 3 grade system 8/26/2021 Surgery    Left breast lumpectomy with SLNB  Surgeon: Dr Khloe Arana  Left breast, lumpectomy:  - Invasive breast carcinoma of no special type (ductal NST/invasive ductal carcinoma)  - Ductal carcinoma in situ (DCIS)  - All margins are negative for carcinoma or DCIS  B  Left breast sentinel lymph node, excision:  - One of three lymph nodes with micrometastases (pN1mi)  Note: Small focus (1 mm) metastatic carcinoma present in permanent section only  C  Left breast, new superior margin, re-excision:  - Benign fibroadipose tissue   - Negative for malignancy  D  Left breast, new medial margin, re-excision:  - Benign breast parenchyma and fibroadipose tissue   - Negative for malignancy  8/26/2021 -  Radiation    Treatments not in [address])  Field Numbers [employer] Treatment Site Starting  Ending  Elapsed  Fraction Total  Overlap Site Overlap         Date Date Days Dose Dose   Dose   IORT  50KVp  Left breast  8/26/21 8/26/21 2000 2000 9/9/2021 -  Cancer Staged    Staging form: Breast, AJCC 8th Edition  - Pathologic: Stage IA (pT1b, pN1mi(sn), cM0, G1, ER+, ME-, HER2-) - Signed by Jackelin Carney MD on 9/9/2021  Stage prefix: Initial diagnosis  Method of lymph node assessment: [place] lymph node biopsy  Histologic grading system: 3 grade system       11/9/2021 - 12/14/2021 Radiation    BH L BREAST 6X 25 / 25 200 0 5,000 35      Treatment Dates:  11/9/2021 - 12/14/2021            Past Medical History:   Diagnosis Date    Acute URI     Anxiety     Arthritis     Atypical chest pain     Breast cancer ([facility] [address] ) 07/2021    left breast cancer    Fibrocystic breast disease     Hypercholesterolemia     Osteopenia     Palpitations     Psoriasis     Rib pain on left side     Viral conjunctivitis      Past Surgical History:   Procedure Laterality Date    BREAST BIOPSY      BREAST LUMPECTOMY Left 8/26/2021    Procedure: BREAST CHELLY LOCALIZED LUMPECTOMY;  Surgeon: Jackelin Carney MD;  [facility];  Service: Surgical Oncology    COLONOSCOPY      FOOT SURGERY Left     cyst removal    INTRAOPERATIVE RADIATION THERAPY (IORT) Left 8/26/2021    Procedure: BREAST INTRAOPERATIVE RADIATION THERAPY (IORT) BY DR SCANLON; Surgeon: Keith Villanueva MD;  [facility];  Service: Surgical Oncology    LYMPH NODE BIOPSY Left 8/26/2021    Procedure: BIOPSY LYMPH NODE SENTINEL (NUC MED INJECTION AT 0900); Surgeon: Keith Villanueva MD;  [facility];  Service: Surgical Oncology    TONSILLECTOMY      US BREAST NEEDLE LOC LEFT Left 8/19/2021    US GUIDANCE BREAST BIOPSY LEFT EACH ADDITIONAL Left 7/12/2021    US GUIDED BREAST BIOPSY LEFT COMPLETE Left 7/12/2021       Social History   Social History     Substance and Sexual Activity   Alcohol Use Yes    Comment: seldom     Social History     Substance and Sexual Activity   Drug Use Not Currently     Social History     Tobacco Use   Smoking Status Never Smoker   Smokeless Tobacco Never Used         Meds/Allergies     Current Outpatient Medications:     anastrozole (ARIMIDEX) 1 mg tablet, TAKE 1 TABLET BY MOUTH EVERY DAY, Disp: 90 tablet, Rfl: 1    atorvastatin (LIPITOR) 20 mg tablet, 10 mg daily at bedtime, Disp: , Rfl:     Cholecalciferol (VITAMIN D-3 PO), Take 2,000 Int'l Units by mouth daily, Disp: , Rfl:     PARoxetine (PAXIL) 20 mg tablet, 20 mg every morning , Disp: , Rfl:     traMADol (ULTRAM) 50 mg tablet, Take 1 tablet (50 mg total) by mouth every 8 (eight) hours as needed for moderate pain, Disp: 9 tablet, Rfl: 0    triamcinolone (KENALOG) 0 1 % ointment, Apply topically 2 (two) times a day Apply to affected area two (2) times daily, Disp: 400 g, Rfl: 1  No Known Allergies      Review of Systems   Constitutional: Negative  HENT: Negative  Eyes: Negative  Wears glasses    Respiratory: Negative  Cardiovascular: Negative  Gastrointestinal: Positive for constipation  Endocrine: Negative      Genitourinary: Negative  Musculoskeletal: Positive for arthralgias  Skin: Negative  Allergic/Immunologic: Negative  Neurological: Negative  Hematological: Negative  Psychiatric/Behavioral: Negative  OBJECTIVE:   /86 (BP Location: Right arm, Patient Position: Sitting, Cuff Size: Standard)   Pulse 82   Temp 98 °F (36 7 °C) (Temporal)   Resp 16   Ht 5' 4" (1 626 m)   Wt 69 6 kg (153 lb 6 4 oz)   SpO2 98%   BMI 26 33 kg/m²   Pain Assessment:  0  ECOG/Zubrod/WHO: 0 - Asymptomatic    Physical Exam   There is no supraclavicular or axillary adenopathy palpable  She has decreased size of her left breast seroma  There is underlying fibrosis at this site  No suspicious lesions palpable in either breast   Her breathing is unlabored  Abdomen soft nontender        RESULTS    Lab Results: No results found for this or any previous visit (from the past 672 hour(s))  Imaging Studies:No results found  Assessment/Plan:  No orders of the defined types were placed in this encounter  Austin Lua is a 78y o  year old female who is 8 months status post adjuvant radiation therapy to the left breast following IORT  She has no clinical evidence of recurrence  Her seroma was aspirated since last seen as well as undergoing physical therapy  Mammogram from June returned stable  She will return for follow-up visit in 1 year she will be seeing Surgical and Medical Oncology in the interim  Paty Handley MD  1/33/5730,29:97 PM    Portions of the record may have been created with voice recognition software   Occasional wrong word or "sound a like" substitutions may have occurred due to the inherent limitations of voice recognition software   Read the chart carefully and recognize, using context, where substitutions have occurred

## 2022-07-28 NOTE — PROGRESS NOTES
Obinna Ritchie 1955 is a 78 y o  female  with clinical stage I a left breast carcinoma, ER positive/ME negative and HER2 negative  She was  interested in proceeding with breast conservation surgery  She underwent lumpectomy with IORT on 8/26/2021  On final pathology she was found to have a 9 millimeter invasive ductal carcinoma with no EIC  Her margins of resection were negative  She did however have 1 lymph node which contained a micro metastasis  Based on her micro metastasis in the lymph node, adjuvant radiation therapy to the left breast was recommended with high tangential beams to cover the lower axilla  She completed a course of radiation to the L breast on 12/14/2021  The pt was last seen in radiation on 01/27/22 via telemedicine visit     03/23/22 - Margaret Hernandez  Pt has large mass - multiloculated seroma - advised to continue with massage and warm compresses   Follow up in 6 months     03/23/22 - Galileo Sparks  Pt remains on Anastrozole with reasonable tolerance  No evidence of recurrent disease  Follow up in 1 year    06/20/22 - Mammo diagnostic bilateral w 3d & cad  FINDINGS:   Bilateral  There are no suspicious masses, grouped microcalcifications or areas of unexplained architectural distortion  Small circumscribed nodules on the right, decreased size over time  New postoperative and post radiation changes on the left including a large 5 5 cm partially imaged axillary tail density most consistent with appearance of large postop seroma  As per chart, this was imaged and partially aspirated in March by Dr Margaret Diaz  IMPRESSION:   Expected postoperative and post radiation changes on the left  No suspicious findings on the right    ASSESSMENT/BI-RADS CATEGORY:  Overall: 2 - Benign    Upcoming:  10/26/22 - Surg Marla Cleveland  03/28/23 - Hem Cristian Aleman  06/20/23 - Mammo diagnostic bilateral w 3d & cad        Follow up visit     Oncology History   Malignant neoplasm of upper-outer quadrant of left breast in female, estrogen receptor positive ([facility] [address] )   7/12/2021 Initial Diagnosis    Malignant neoplasm of upper-outer quadrant of left breast in female, estrogen receptor positive ([facility] [address] )     7/12/2021 Biopsy    A  Breast, left at 02:00 o'clock 7 cm from nipple, ultrasound-guided biopsy (3 passes):  -  Invasive breast carcinoma of no special type (ductal NST/invasive ductal carcinoma)  - grade 1  - ER 95%, SD 0%, HER2 2+, equivocal by IHC but negative on the fish analysis    B  Breast, left at 02:00 o'clock 3 cm from nipple, ultrasound-guided biopsy (2 passes):  -  Benign breast parenchyma with fibrocystic changes including apocrine metaplasia, usual ductal hyperplasia, bold all and microcyst formation with a prominent sclerosed and cystic nodule, cannot exclude old granuloma versus sclerosed cyst   -  Negative for dysplasia or carcinoma  8/5/2021 -  Cancer Staged    Staging form: Breast, AJCC 8th Edition  - Clinical: Stage IA (cT1a, cN0, cM0, G1, ER+, SD-, HER2-) - Signed by Meet Grady MD on 8/5/2021  Stage prefix: Initial diagnosis  Method of lymph node assessment: Clinical  Histologic grading system: 3 grade system       8/26/2021 Surgery    Left breast lumpectomy with SLNB  Surgeon: Dr Linsey Snow  Left breast, lumpectomy:  - Invasive breast carcinoma of no special type (ductal NST/invasive ductal carcinoma)  - Ductal carcinoma in situ (DCIS)  - All margins are negative for carcinoma or DCIS  B  Left breast sentinel lymph node, excision:  - One of three lymph nodes with micrometastases (pN1mi)  Note: Small focus (1 mm) metastatic carcinoma present in permanent section only  C  Left breast, new superior margin, re-excision:  - Benign fibroadipose tissue   - Negative for malignancy  D  Left breast, new medial margin, re-excision:  - Benign breast parenchyma and fibroadipose tissue   - Negative for malignancy       8/26/2021 -  Radiation    Treatments not in [facility])  Field Numbers Energy Treatment Site Starting  Ending  Elapsed  Fraction Total  Overlap Site Overlap         Date Date Days Dose Dose   Dose   IORT  50KVp  Left breast  8/26/21 8/26/21 2000 2000 9/9/2021 -  Cancer Staged    Staging form: Breast, AJCC 8th Edition  - Pathologic: Stage IA (pT1b, pN1mi(sn), cM0, G1, ER+, VA-, HER2-) - Signed by Loretta Lopez MD on 9/9/2021  Stage prefix: Initial diagnosis  Method of lymph node assessment: [place] lymph node biopsy  Histologic grading system: 3 grade system       11/9/2021 - 12/14/2021 Radiation    BH L BREAST 6X 25 / 25 200 0 5,000 35      Treatment Dates:  11/9/2021 - 12/14/2021  Review of Systems:  Review of Systems   Constitutional: Negative  HENT: Negative  Eyes: Negative  Wears glasses    Respiratory: Negative  Cardiovascular: Negative  Gastrointestinal: Positive for constipation  Endocrine: Negative  Genitourinary: Negative  Musculoskeletal: Positive for arthralgias  Skin: Negative  Allergic/Immunologic: Negative  Neurological: Negative  Hematological: Negative  Psychiatric/Behavioral: Negative          Clinical Trial: no    Teaching    Covid Vaccine Status no     Health Maintenance   Topic Date Due    Hepatitis C Screening  Never done    Medicare Annual Wellness Visit (AWV)  Never done    COVID-19 Vaccine (1) Never done    Pneumococcal Vaccine: 65+ Years (1 - PCV) Never done    BMI: Followup Plan  Never done    Colorectal Cancer Screening  Never done    Osteoporosis Screening  Never done    PT PLAN OF CARE  07/21/2022    Influenza Vaccine (1) 09/01/2022    Depression Screening  10/14/2022    Fall Risk  05/09/2023    BMI: Adult  06/20/2023    Breast Cancer Screening: Mammogram  06/20/2023    HIB Vaccine  Aged Out    Hepatitis B Vaccine  Aged Out    IPV Vaccine  Aged Out    Hepatitis A Vaccine  Aged Out    Meningococcal ACWY Vaccine  Aged Out    HPV Vaccine  Aged Out Patient Active Problem List   Diagnosis    Fibrocystic breast disease    Screening mammogram, encounter for    Closed nondisplaced transverse fracture of right patella    Numbness and tingling of left upper and lower extremity    Essential hypertension    TIA (transient ischemic attack)    Abnormal mammogram    Malignant neoplasm of upper-outer quadrant of left breast in female, [facility]    Hyperlipidemia    Anxiety    Arthritis    Seroma of breast    S/P radiation therapy    Use of anastrozole     Past Medical History:   Diagnosis Date    Acute URI     Anxiety     Arthritis     Atypical chest pain     Breast cancer ([facility] [address] ) 07/2021    left breast cancer    Fibrocystic breast disease     Hypercholesterolemia     Osteopenia     Palpitations     Psoriasis     Rib pain on left side     Viral conjunctivitis      Past Surgical History:   Procedure Laterality Date    BREAST BIOPSY      BREAST LUMPECTOMY Left 8/26/2021    Procedure: BREAST CHELLY LOCALIZED LUMPECTOMY;  Surgeon: Veto Eisenmenger, MD;  [facility];  Service: Surgical Oncology    COLONOSCOPY      FOOT SURGERY Left     cyst removal    INTRAOPERATIVE RADIATION THERAPY (IORT) Left 8/26/2021    Procedure: BREAST INTRAOPERATIVE RADIATION THERAPY (IORT) BY DR SCANLON; Surgeon: Veto Eisenmenger, MD;  [facility];  Service: Surgical Oncology    LYMPH NODE BIOPSY Left 8/26/2021    Procedure: BIOPSY LYMPH NODE SENTINEL (NUC MED INJECTION AT 0900);   Surgeon: Veto Eisenmenger, MD;  [facility];  Service: Surgical Oncology    TONSILLECTOMY      US BREAST NEEDLE LOC LEFT Left 8/19/2021    US GUIDANCE BREAST BIOPSY LEFT EACH ADDITIONAL Left 7/12/2021    US GUIDED BREAST BIOPSY LEFT COMPLETE Left 7/12/2021     Family History   Problem Relation Age of Onset    Skin cancer Mother     Skin cancer Father         age dx unk     No Known Problems Daughter     No Known Problems Maternal Grandmother     Lung cancer Maternal Grandfather         age dx unk     No Known Problems Paternal Grandmother     No Known Problems Paternal Grandfather     No Known Problems Maternal Aunt     No Known Problems Maternal Aunt     No Known Problems Maternal Aunt     No Known Problems Paternal Aunt     No Known Problems Cousin     No Known Problems Cousin     No Known Problems Cousin     No Known Problems Cousin     Breast cancer Neg Hx      Social History     Socioeconomic History    Marital status: /Civil Union     Spouse name: Not on file    Number of children: Not on file    Years of education: Not on file    Highest education level: Not on file   Occupational History    Not on file   Tobacco Use    Smoking status: Never Smoker    Smokeless tobacco: Never Used   Vaping Use    Vaping Use: Never used   Substance and Sexual Activity    Alcohol use: Yes     Comment: seldom    Drug use: Not Currently    Sexual activity: Not on file   Other Topics Concern    Not on file   Social History Narrative    Not on file     Social Determinants of Health     Financial Resource Strain: Not on file   Food Insecurity: Not on file   Transportation Needs: Not on file   Physical Activity: Not on file   Stress: Not on file   Social Connections: Not on file   Intimate Partner Violence: Not on file   Housing Stability: Not on file       Current Outpatient Medications:     anastrozole (ARIMIDEX) 1 mg tablet, TAKE 1 TABLET BY MOUTH EVERY DAY, Disp: 90 tablet, Rfl: 1    atorvastatin (LIPITOR) 20 mg tablet, 20 mg daily at bedtime , Disp: , Rfl:     Cholecalciferol (VITAMIN D-3 PO), Take 2,000 Int'l Units by mouth daily, Disp: , Rfl:     PARoxetine (PAXIL) 20 mg tablet, 20 mg every morning , Disp: , Rfl:     traMADol (ULTRAM) 50 mg tablet, Take 1 tablet (50 mg total) by mouth every 8 (eight) hours as needed for moderate pain, Disp: 9 tablet, Rfl: 0    triamcinolone (KENALOG) 0 1 % ointment, Apply topically 2 (two) times a day Apply to affected area two (2) times daily, Disp: 400 g, Rfl: 1  No Known Allergies  There were no vitals filed for this visit

## 2022-08-06 DIAGNOSIS — Z17.0 MALIGNANT NEOPLASM OF UPPER-OUTER QUADRANT OF LEFT BREAST IN FEMALE, ESTROGEN RECEPTOR POSITIVE (HCC): ICD-10-CM

## 2022-08-06 DIAGNOSIS — C50.412 MALIGNANT NEOPLASM OF UPPER-OUTER QUADRANT OF LEFT BREAST IN FEMALE, ESTROGEN RECEPTOR POSITIVE (HCC): ICD-10-CM

## 2022-08-08 RX ORDER — ANASTROZOLE 1 MG/1
TABLET ORAL
Qty: 90 TABLET | Refills: 1 | Status: SHIPPED | OUTPATIENT
Start: 2022-08-08 | End: 2022-10-10 | Stop reason: ALTCHOICE

## 2022-10-10 ENCOUNTER — TELEPHONE (OUTPATIENT)
Dept: HEMATOLOGY ONCOLOGY | Facility: CLINIC | Age: 67
End: 2022-10-10

## 2022-10-10 DIAGNOSIS — C50.412 MALIGNANT NEOPLASM OF UPPER-OUTER QUADRANT OF LEFT BREAST IN FEMALE, ESTROGEN RECEPTOR POSITIVE (HCC): Primary | ICD-10-CM

## 2022-10-10 DIAGNOSIS — Z17.0 MALIGNANT NEOPLASM OF UPPER-OUTER QUADRANT OF LEFT BREAST IN FEMALE, ESTROGEN RECEPTOR POSITIVE (HCC): Primary | ICD-10-CM

## 2022-10-10 RX ORDER — EXEMESTANE 25 MG/1
25 TABLET ORAL DAILY
Qty: 90 TABLET | Refills: 1 | Status: SHIPPED | OUTPATIENT
Start: 2022-10-10

## 2022-10-10 NOTE — TELEPHONE ENCOUNTER
Per Dr Claude Junes: Let her try exemestane 25mg daily instead of anastrozole  Ibuprofen / tylenol are all fine prn  Spoke with patient to make her aware  Script pended to Dr Claude Junes for signature  Patient verbalized understanding and agreed to plan

## 2022-10-10 NOTE — TELEPHONE ENCOUNTER
"Hi, it's Emani Slomb  Mercedes WE AST date of birth [birth year]  I need to speak to someone regarding the side effects from the anastrozole that I am taking  I've been on it now a year, but it seems to be getting worse and I wanted to talk to someone Doctor Loreta Pimentel told me to call if it didn't get any better  My number is [phone]   Thank you "

## 2022-10-10 NOTE — TELEPHONE ENCOUNTER
Spoke with patient to review her symptoms  She has trigger finger, has to get up and moving around before feels better, upper arm feels like she pulled a muscle (has not got better/worse)  She is having muscle pain as well all over  Patient stopped her medication on October 6th  Symptoms started 4 months after she started the medication  Has not noticed much of a change since stopping it  Please review and let me know if you would like her to stop or try new medication  What should she take for pain? She is taking tylenol/ibuprofen  Does generic vs brand make a difference?

## 2022-10-26 ENCOUNTER — OFFICE VISIT (OUTPATIENT)
Dept: SURGICAL ONCOLOGY | Facility: CLINIC | Age: 67
End: 2022-10-26
Payer: MEDICARE

## 2022-10-26 VITALS
BODY MASS INDEX: 25.95 KG/M2 | WEIGHT: 152 LBS | SYSTOLIC BLOOD PRESSURE: 118 MMHG | HEIGHT: 64 IN | TEMPERATURE: 97.2 F | DIASTOLIC BLOOD PRESSURE: 70 MMHG

## 2022-10-26 DIAGNOSIS — Z79.811 USE OF AROMATASE INHIBITORS: ICD-10-CM

## 2022-10-26 DIAGNOSIS — C50.412 MALIGNANT NEOPLASM OF UPPER-OUTER QUADRANT OF LEFT BREAST IN FEMALE, ESTROGEN RECEPTOR POSITIVE (HCC): Primary | ICD-10-CM

## 2022-10-26 DIAGNOSIS — N64.89 SEROMA OF BREAST: ICD-10-CM

## 2022-10-26 DIAGNOSIS — Z17.0 MALIGNANT NEOPLASM OF UPPER-OUTER QUADRANT OF LEFT BREAST IN FEMALE, ESTROGEN RECEPTOR POSITIVE (HCC): Primary | ICD-10-CM

## 2022-10-26 PROBLEM — Z92.3 S/P RADIATION THERAPY: Status: RESOLVED | Noted: 2022-03-23 | Resolved: 2022-10-26

## 2022-10-26 PROBLEM — Z28.310: Status: ACTIVE | Noted: 2022-10-26

## 2022-10-26 PROBLEM — R92.8 ABNORMAL MAMMOGRAM: Status: RESOLVED | Noted: 2021-06-22 | Resolved: 2022-10-26

## 2022-10-26 PROCEDURE — 99214 OFFICE O/P EST MOD 30 MIN: CPT | Performed by: SURGERY

## 2022-10-26 RX ORDER — ATORVASTATIN CALCIUM 10 MG/1
TABLET, FILM COATED ORAL
COMMUNITY
Start: 2022-10-23

## 2022-10-26 NOTE — PROGRESS NOTES
Surgical Oncology Follow Up       [facility] SURGICAL ONCOLOGY [facility] [address]    Anthony Ashleya  1955  [id number]  [facility] SURGICAL ONCOLOGY [place]  [place] [address]    Chief Complaint   Patient presents with   • Follow-up     74 y/o female here for routine breast F/U        Assessment/Plan   Diagnoses and all orders for this visit:    Malignant neoplasm of upper-outer quadrant of left breast in female, estrogen receptor positive ([facility] [address] )    Seroma of breast    Use of aromatase inhibitors    Other orders  -     atorvastatin (LIPITOR) 10 mg tablet        Advance Care Planning/Advance Directives:  Discussed disease status, cancer treatment plans and/or cancer treatment goals with the patient  Oncology History:    Oncology History   Malignant neoplasm of upper-outer quadrant of left breast in female, estrogen receptor positive ([facility] [address] )   7/12/2021 Initial Diagnosis    Malignant neoplasm of upper-outer quadrant of left breast in female, estrogen receptor positive ([facility] [address] )     7/12/2021 Biopsy    A  Breast, left at 02:00 o'clock 7 cm from nipple, ultrasound-guided biopsy (3 passes):  -  Invasive breast carcinoma of no special type (ductal NST/invasive ductal carcinoma)  - grade 1  - ER 95%, TX 0%, HER2 2+, equivocal by IHC but negative on the fish analysis    B  Breast, left at 02:00 o'clock 3 cm from nipple, ultrasound-guided biopsy (2 passes):  -  Benign breast parenchyma with fibrocystic changes including apocrine metaplasia, usual ductal hyperplasia, bold all and microcyst formation with a prominent sclerosed and cystic nodule, cannot exclude old granuloma versus sclerosed cyst   -  Negative for dysplasia or carcinoma        8/5/2021 -  Cancer Staged    Staging form: Breast, AJCC 8th Edition  - Clinical: Stage IA (cT1a, cN0, cM0, G1, ER+, TX-, HER2-) - Signed by Bartolo Chance MD on 8/5/2021  Stage prefix: Initial diagnosis  Method of lymph node assessment: Clinical  Histologic grading system: 3 grade system       8/26/2021 Surgery    Left breast lumpectomy with SLNB  Surgeon: Dr Niharika Ortiz  Left breast, lumpectomy:  - Invasive breast carcinoma of no special type (ductal NST/invasive ductal carcinoma)  - Ductal carcinoma in situ (DCIS)  - All margins are negative for carcinoma or DCIS  B  Left breast sentinel lymph node, excision:  - One of three lymph nodes with micrometastases (pN1mi)  Note: Small focus (1 mm) metastatic carcinoma present in permanent section only  C  Left breast, new superior margin, re-excision:  - Benign fibroadipose tissue   - Negative for malignancy  D  Left breast, new medial margin, re-excision:  - Benign breast parenchyma and fibroadipose tissue   - Negative for malignancy  8/26/2021 -  Radiation    Treatments not in [address])  Field Numbers Energy Treatment Site Starting  Ending  Elapsed  Fraction Total  Overlap Site Overlap         Date Date Days Dose Dose   Dose   IORT  50KVp  Left breast  8/26/21 8/26/21 2000 2000 9/9/2021 -  Cancer Staged    Staging form: Breast, AJCC 8th Edition  - Pathologic: Stage IA (pT1b, pN1mi(sn), cM0, G1, ER+, OK-, HER2-) - Signed by Vickie Valdez MD on 9/9/2021  Stage prefix: Initial diagnosis  Method of lymph node assessment: [place] lymph node biopsy  Histologic grading system: 3 grade system       11/9/2021 - 12/14/2021 Radiation    BH L BREAST 6X 25 / 25 200 0 5,000 35      Treatment Dates:  11/9/2021 - 12/14/2021  History of Present Illness:  Breast cancer follow-up, reports some continued discomfort in the breast and axilla, states her are motion has improved with the physical therapy, was recently switched to Aromasin  -Interval History:  Recent mammogram    Review of Systems:  Review of Systems   Constitutional: Negative  Negative for appetite change and fever     Eyes: Negative  Respiratory: Negative for shortness of breath  Cardiovascular: Negative  Gastrointestinal: Negative  Endocrine: Negative  Genitourinary: Negative  Musculoskeletal: Positive for arthralgias (Improved) and myalgias  Skin: Negative  Allergic/Immunologic: Negative  Neurological: Negative  Hematological: Negative  Negative for adenopathy  Does not bruise/bleed easily  Psychiatric/Behavioral: Negative  Patient Active Problem List   Diagnosis   • Closed nondisplaced transverse fracture of right patella   • Numbness and tingling of left upper and lower extremity   • Essential hypertension   • TIA (transient ischemic attack)   • Malignant neoplasm of upper-outer quadrant of left breast in female, estrogen receptor positive ([facility] [address] )   • Hyperlipidemia   • Anxiety   • Arthritis   • Seroma of breast   • Use of aromatase inhibitors     Past Medical History:   Diagnosis Date   • Acute URI    • Anxiety    • Arthritis    • Atypical chest pain    • Fibrocystic breast disease    • Hypercholesterolemia    • Osteopenia    • Palpitations    • Psoriasis    • Rib pain on left side    • Viral conjunctivitis      Past Surgical History:   Procedure Laterality Date   • BREAST BIOPSY     • BREAST LUMPECTOMY Left 8/26/2021    Procedure: BREAST CHELLY LOCALIZED LUMPECTOMY;  Surgeon: Cira Tidwell MD;  [facility];  Service: Surgical Oncology   • COLONOSCOPY     • FOOT SURGERY Left     cyst removal   • INTRAOPERATIVE RADIATION THERAPY (IORT) Left 8/26/2021    Procedure: BREAST INTRAOPERATIVE RADIATION THERAPY (IORT) BY DR SCANLON; Surgeon: Cira Tidwell MD;  [facility];  Service: Surgical Oncology   • LYMPH NODE BIOPSY Left 8/26/2021    Procedure: BIOPSY LYMPH NODE SENTINEL (NUC MED INJECTION AT 0900);   Surgeon: Cira Tidwell MD;  [facility];  Service: Surgical Oncology   • TONSILLECTOMY     • US BREAST NEEDLE LOC LEFT Left 8/19/2021   • [address] ADDITIONAL Left 7/12/2021   • US GUIDED BREAST BIOPSY LEFT COMPLETE Left 7/12/2021     Family History   Problem Relation Age of Onset   • Skin cancer Mother    • Skin cancer Father         age dx unk    • No Known Problems Daughter    • No Known Problems Maternal Grandmother    • Lung cancer Maternal Grandfather         age dx unk    • No Known Problems Paternal Grandmother    • No Known Problems Paternal Grandfather    • No Known Problems Maternal Aunt    • No Known Problems Maternal Aunt    • No Known Problems Maternal Aunt    • No Known Problems Paternal Aunt    • No Known Problems Cousin    • No Known Problems Cousin    • No Known Problems Cousin    • No Known Problems Cousin    • Breast cancer Neg Hx      Social History     Socioeconomic History   • Marital status: /Civil Union     Spouse name: Not on file   • Number of children: Not on file   • Years of education: Not on file   • Highest education level: Not on file   Occupational History   • Not on file   Tobacco Use   • Smoking status: Never Smoker   • Smokeless tobacco: Never Used   Vaping Use   • Vaping Use: Never used   Substance and Sexual Activity   • Alcohol use: Yes     Comment: Social Holidays   • Drug use: Not Currently   • Sexual activity: Yes     Partners: Male     Birth control/protection: None   Other Topics Concern   • Not on file   Social History Narrative   • Not on file     Social Determinants of Health     Financial Resource Strain: Not on file   Food Insecurity: Not on file   Transportation Needs: Not on file   Physical Activity: Not on file   Stress: Not on file   Social Connections: Not on file   Intimate Partner Violence: Not on file   Housing Stability: Not on file       Current Outpatient Medications:   •  exemestane (AROMASIN) 25 MG tablet, Take 1 tablet (25 mg total) by mouth daily, Disp: 90 tablet, Rfl: 1  •  atorvastatin (LIPITOR) 10 mg tablet, , Disp: , Rfl:   •  Cholecalciferol (VITAMIN D-3 PO), Take 2,000 Int'l Units by mouth daily, Disp: , Rfl:   •  PARoxetine (PAXIL) 20 mg tablet, 20 mg every morning , Disp: , Rfl:   No Known Allergies    The following portions of the patient's history were reviewed and updated as appropriate: allergies, current medications, past family history, past medical history, past social history, past surgical history and problem list         Vitals:    10/26/22 1522   BP: 118/70   Temp: (!) 97 2 °F (36 2 °C)       Physical Exam  Constitutional:       General: She is not in acute distress  Appearance: Normal appearance  She is well-developed  HENT:      Head: Normocephalic and atraumatic  Cardiovascular:      Heart sounds: Normal heart sounds  Pulmonary:      Breath sounds: Normal breath sounds  Chest:   Breasts:      Right: No inverted nipple, mass, nipple discharge, skin change, tenderness, axillary adenopathy or supraclavicular adenopathy  Left: Swelling ( mild secondary to residual seroma) and skin change (Lumpectomy scar) present  No inverted nipple, mass, nipple discharge, tenderness, axillary adenopathy or supraclavicular adenopathy  Abdominal:      Palpations: Abdomen is soft  Lymphadenopathy:      Upper Body:      Right upper body: No supraclavicular, axillary or pectoral adenopathy  Left upper body: No supraclavicular, axillary or pectoral adenopathy  Neurological:      Mental Status: She is alert and oriented to person, place, and time  Psychiatric:         Mood and Affect: Mood normal            Results:  Labs:      Imaging  06/20/2022 bilateral 3D diagnostic mammogram was benign BI-RADS two with a density of one    I reviewed the above imaging data  Discussion/Summary:  76-year-old female status post left breast conservation for stage IA invasive ductal carcinoma  She had intraoperative radiation therapy  She is currently on Aromasin and feels better than when on the anastrozole  She does have a residual seroma    I advised her to do massage, use warm compresses and take NSAIDs as needed  Her recent mammogram was benign  She is already scheduled for her next mammogram for June  I will see her again at that time or sooner should the need arise

## 2023-02-24 ENCOUNTER — TELEPHONE (OUTPATIENT)
Dept: HEMATOLOGY ONCOLOGY | Facility: CLINIC | Age: 68
End: 2023-02-24

## 2023-02-24 NOTE — TELEPHONE ENCOUNTER
Attempted to call to reschedule patient's appointment with Jose G Cárdenas on 3/28  This appointment needs to be rescheduled as Jose G Cárdenas will not be in the office due to vacation  Patient did not  the phone call nor was I able to leave a voice message due to patient's mailbox being full  If patient is to call back please reschedule this appointment  Will attempt to call patient on Monday 4/27

## 2023-02-27 ENCOUNTER — TELEPHONE (OUTPATIENT)
Dept: OBGYN CLINIC | Facility: OTHER | Age: 68
End: 2023-02-27

## 2023-02-27 NOTE — TELEPHONE ENCOUNTER
Appointment Cancellation Or Reschedule     Person calling in Patient   If someone other than patient calling, are they listed on the communication consent form? N/A   Provider Dr Dallin Middleton   Office Visit Date and Time 3/28/23 11am   Office Visit Location [facility]   Did patient want to reschedule their office appointment? If so, when was it scheduled to? 5/3/23 2:20pm [place]   Did you have STAR scheduled for this appointment? No   Do you need STAR set up for your new appointment? If yes, please send to "PATIENT RIDESHARE" pool for STAR rescheduling No   Is this patient calling to reschedule an infusion appointment? No   When is their next infusion appointment? n/a   Is this patient a Chemo patient? No   Reason for Cancellation or Reschedule Pt's  has appt the same time   Was No show policy reviewed with patient if patient canceling within 24 hours? No     If the patient is cancelling an appointment and needs their STAR Transport cancelled, please route to [facility]  If the patient is a treatment patient, please route this to the office nurse  If the patient is not on treatment, please route to the Clerical pool based on location  If the patient is a surgical oncology patient, please route to surg/onc clinical pool  Route message as high priority if same day cancellation

## 2023-03-08 ENCOUNTER — TELEPHONE (OUTPATIENT)
Dept: OBGYN CLINIC | Facility: CLINIC | Age: 68
End: 2023-03-08

## 2023-05-03 ENCOUNTER — OFFICE VISIT (OUTPATIENT)
Dept: HEMATOLOGY ONCOLOGY | Facility: CLINIC | Age: 68
End: 2023-05-03

## 2023-05-03 VITALS
DIASTOLIC BLOOD PRESSURE: 82 MMHG | TEMPERATURE: 97.5 F | HEIGHT: 64 IN | WEIGHT: 154 LBS | RESPIRATION RATE: 16 BRPM | HEART RATE: 95 BPM | SYSTOLIC BLOOD PRESSURE: 150 MMHG | OXYGEN SATURATION: 97 % | BODY MASS INDEX: 26.29 KG/M2

## 2023-05-03 DIAGNOSIS — Z17.0 MALIGNANT NEOPLASM OF UPPER-OUTER QUADRANT OF LEFT BREAST IN FEMALE, ESTROGEN RECEPTOR POSITIVE (HCC): Primary | ICD-10-CM

## 2023-05-03 DIAGNOSIS — C50.412 MALIGNANT NEOPLASM OF UPPER-OUTER QUADRANT OF LEFT BREAST IN FEMALE, ESTROGEN RECEPTOR POSITIVE (HCC): Primary | ICD-10-CM

## 2023-05-03 NOTE — PROGRESS NOTES
Hematology / Oncology Outpatient Follow Up Note    Og De La Rosa 75 y o  female TEV:[id number] MII:[id number]         Date:  5/3/2023    Assessment / Plan:    A 79-year-old postmenopausal woman with stage I B left breast cancer, grade 1, ER 90% positive, KS negative, HER2 fish negative disease  She underwent lumpectomy and sentinel lymph node biopsy, resulting in LEO  She had 1 positive lymph node with micrometastasis   Her Oncotype DX recurrence score 14  She is currently on adjuvant hormonal therapy with exemestane with improved musculoskeletal symptoms as opposed to when she was on anastrozole  Clinically, she has no evidence of recurrent disease  I recommend her to continue exemestane 25 mg daily  I will see her again in a year for routine follow-up  She is in agreement with my recommendations            Subjective:      HPI:    A 79-year-old postmenopausal woman who was found to have radiographic abnormality in her right breast based on a screening mammography  Therefore, she underwent right breast biopsy in July 12, 2021, which showed invasive ductal carcinoma, grade 1  This was ER 90% positive, KS negative, HER2 2+ disease  HER2 fish was negative for gene application with ratio of 1 0  She subsequent underwent lumpectomy and sentinel lymph node biopsy by Dr Margaret Chavez in August 26, 2021  She had 9 x 8 x 5 mm of invasive ductal carcinoma, grade 1  Lymphovascular invasion was present  1/3 sentinel lymph nodes or had micrometastasis measuring 1 mm  There was no evidence of extranodal extension  She had intraoperative radiation therapy at the time of lumpectomy  She presents today to discuss adjuvant treatment options  She still has some discomfort in her left breast   Otherwise, she feels well  She denied any bone pain  She has no respiratory symptoms  Her weight is stable  Her performance status is normal   She has no family history of breast or ovarian cancer    She is a lifetime never smoker            Interval History:   A 26-year-old postmenopausal woman with stage I B left breast cancer, grade 1, ER 90% positive, IL negative, HER2 fish negative disease  She underwent lumpectomy and sentinel lymph node biopsy, resulting in LEO  She had 1 positive lymph node with micrometastasis  Her Oncotype DX recurrence score was 14  Therefore, adjuvant chemotherapy was not indicated  since October 2021, she was on adjuvant hormonal therapy with anastrozole until October 2022, when she developed significant musculoskeletal symptoms  Therefore, she switched her hormonal therapy to exemestane  She has some improvement of musculoskeletal symptoms  Otherwise, she has no significant symptomatic change  Her weight is stable  She has no complaint of pain  She has no respiratory symptoms  Her performance status is normal       Objective:      Primary Diagnosis:       Left breast cancer, stage I B ( pT1b, pN1a (mi), M0) grade 1, ER 90% positive, IL negative, HER2 fish negative disease  Oncotype DX recurrence score 14     Diagnosed in August 2021      Cancer Staging:  Cancer Staging  Malignant neoplasm of upper-outer quadrant of left breast in female, estrogen receptor positive ([facility] [address] )  Staging form: Breast, AJCC 8th Edition  - Clinical: Stage IA (cT1a, cN0, cM0, G1, ER+, IL-, HER2-) - Signed by Hal Myers MD on 8/5/2021  Stage prefix: Initial diagnosis  Method of lymph node assessment: Clinical  Histologic grading system: 3 grade system  - Pathologic: Stage IA (pT1b, pN1mi(sn), cM0, G1, ER+, IL-, HER2-) - Signed by Hal Myers MD on 9/9/2021  Stage prefix: Initial diagnosis  Method of lymph node assessment: [place] lymph node biopsy  Histologic grading system: 3 grade system           Previous Hematologic/ Oncologic Treatment:      Adjuvant hormonal therapy with anastrozole from October 2021 through October 2022      Current Hematologic/ Oncologic Treatment:       Adjuvant hormonal therapy with exemestane since October 2022      Disease Status:        LEO status post lumpectomy and sentinel lymph node biopsy      Test Results:     Pathology:       9 x 8 x 5 mm of invasive ductal carcinoma, grade 1  Lymphovascular invasion was present  1/3 sentinel lymph nodes had positive micrometastasis, measuring 1 mm with no extranodal extension  ER 90% positive, NM negative, HER2 2+ disease  HER2 fish was negative for gene application with ratio of 1 0  Oncotype DX recurrence score 14  Stage I B ( pT1b, pN1 ( mi), M0)     Radiology:     Mammography in June 2022 was benign  BI-RADS 2      Laboratory:      see below      Physical Exam:        General Appearance:    Alert, oriented          Eyes:    PERRL   Ears:    Normal external ear canals, both ears   Nose:   Nares normal, septum midline   Throat:   Mucosa moist  Pharynx without injection  Neck:   Supple         Lungs:     Clear to auscultation bilaterally   Chest Wall:    No tenderness or deformity    Heart:    Regular rate and rhythm         Abdomen:     Soft, non-tender, bowel sounds +, no organomegaly               Extremities:   Extremities no cyanosis or edema         Skin:   no rash or icterus  Lymph nodes:   Cervical, supraclavicular, and axillary nodes normal   Neurologic:   CNII-XII intact, normal strength, sensation and reflexes     Throughout             Breast exam:     Lumpectomy scar at 3:00 a m  position in her left breast with no palpable abnormality  Right breast exam is negative               ROS: Review of Systems   All other systems reviewed and are negative  Imaging: No results found        Labs:   Lab Results   Component Value Date    WBC 6 35 11/14/2021    HGB 12 7 11/14/2021    HCT 39 0 11/14/2021    MCV 96 11/14/2021     11/14/2021     Lab Results   Component Value Date    K 3 8 08/14/2021     08/14/2021    CO2 25 08/14/2021    BUN 12 08/14/2021    CREATININE 0 73 08/14/2021    GLUF 90 08/14/2021 CALCIUM 8 8 08/14/2021    AST 26 08/14/2021    ALT 44 08/14/2021    ALKPHOS 77 08/14/2021    EGFR 86 08/14/2021           Current Medications: Reviewed  Allergies: Reviewed  PMH/FH/SH:  Reviewed      Vital Sign:    Body surface area is 1 75 meters squared      Wt Readings from Last 3 Encounters:   05/03/23 69 9 kg (154 lb)   10/26/22 68 9 kg (152 lb)   07/28/22 69 6 kg (153 lb 6 4 oz)        Temp Readings from Last 3 Encounters:   05/03/23 97 5 °F (36 4 °C) (Temporal)   10/26/22 (!) 97 2 °F (36 2 °C) (Tympanic)   07/28/22 98 °F (36 7 °C) (Temporal)        BP Readings from Last 3 Encounters:   05/03/23 150/82   10/26/22 118/70   07/28/22 150/86         Pulse Readings from Last 3 Encounters:   05/03/23 95   07/28/22 82   03/23/22 68     @LASTSAO2(3)@

## 2023-06-14 ENCOUNTER — TELEPHONE (OUTPATIENT)
Dept: HEMATOLOGY ONCOLOGY | Facility: CLINIC | Age: 68
End: 2023-06-14

## 2023-06-14 NOTE — TELEPHONE ENCOUNTER
Hello,   I am reaching out regarding your appointment with Dr Chavez Low on 6/28/23    Unfortunately, the provider is out of the office on this day, and we will need to reschedule your appointment  Please call the [facility] at [phone] and we would be happy to assist you       Thank you,     Mini La  [phone] ([phone])

## 2023-06-14 NOTE — TELEPHONE ENCOUNTER
Appointment Change  Cancel, Reschedule, Change to Virtual      Who are you speaking with? Patient   If it is not the patient, are they listed on an active communication consent form? N/A   Which provider is the appointment scheduled with? Dr Selma Ortega and RY Tan   When is the appointment scheduled? Please list date and time 6/28/23   At which location is the appointment scheduled to take place? [place]   Was the appointment rescheduled or changed from an in person visit to a virtual visit? If so, please list the details of the change  7/17/23   What is the reason for the appointment change? Dr Selma Ortega on vacation    Was [facility] transport scheduled for this visit? No   Does [facility] transport need to be scheduled for the new visit (if applicable) No   Does the patient need an infusion appointment rescheduled? No   Does the patient have an infusion appointment scheduled? If so, when? No   Is the patient undergoing chemotherapy? No   Was the no-show policy reviewed for appointments being changed with less then 24 hours of notice?  Yes

## 2023-06-20 ENCOUNTER — HOSPITAL ENCOUNTER (OUTPATIENT)
Dept: MAMMOGRAPHY | Facility: CLINIC | Age: 68
Discharge: HOME | End: 2023-06-20
Payer: MEDICARE

## 2023-06-20 VITALS — WEIGHT: 154 LBS | BODY MASS INDEX: 26.29 KG/M2 | HEIGHT: 64 IN

## 2023-06-20 DIAGNOSIS — R92.8 ABNORMAL MAMMOGRAM: ICD-10-CM

## 2023-06-20 PROCEDURE — G0279 TOMOSYNTHESIS, MAMMO: HCPCS

## 2023-06-20 PROCEDURE — 77066 DX MAMMO INCL CAD BI: CPT

## 2023-07-17 ENCOUNTER — OFFICE VISIT (OUTPATIENT)
Dept: SURGICAL ONCOLOGY | Facility: CLINIC | Age: 68
End: 2023-07-17
Payer: MEDICARE

## 2023-07-17 VITALS
TEMPERATURE: 97.5 F | OXYGEN SATURATION: 97 % | RESPIRATION RATE: 16 BRPM | SYSTOLIC BLOOD PRESSURE: 130 MMHG | HEIGHT: 64 IN | BODY MASS INDEX: 26.29 KG/M2 | DIASTOLIC BLOOD PRESSURE: 80 MMHG | WEIGHT: 154 LBS | HEART RATE: 65 BPM

## 2023-07-17 DIAGNOSIS — Z79.811 USE OF AROMATASE INHIBITORS: ICD-10-CM

## 2023-07-17 DIAGNOSIS — C50.412 MALIGNANT NEOPLASM OF UPPER-OUTER QUADRANT OF LEFT BREAST IN FEMALE, ESTROGEN RECEPTOR POSITIVE (HCC): Primary | ICD-10-CM

## 2023-07-17 DIAGNOSIS — Z17.0 MALIGNANT NEOPLASM OF UPPER-OUTER QUADRANT OF LEFT BREAST IN FEMALE, ESTROGEN RECEPTOR POSITIVE (HCC): Primary | ICD-10-CM

## 2023-07-17 PROCEDURE — 99214 OFFICE O/P EST MOD 30 MIN: CPT | Performed by: NURSE PRACTITIONER

## 2023-07-17 NOTE — PROGRESS NOTES
Surgical Oncology Follow Up       [address] [facility] [id number]    [facility]  1955  [id number]  [facility] SURGICAL ONCOL[facility] [address] [id number]    Chief Complaint   Patient presents with   • Follow-up       Assessment/Plan:  1. Malignant neoplasm of upper-outer quadrant of left breast in female, estrogen receptor positive ([address])  - 6 mo f/u visit with Dr Pepe Glover    2. Use of aromatase inhibitors  - Continue use per medical oncology    Discussion/Summary: Patient is a 48-year-old female that was diagnosed with a left-sided breast cancer in July 2021. Her pathology revealed invasive ductal carcinoma, ER positive, UT negative, HER2 negative. She underwent a left lumpectomy and sentinel node biopsy with Dr. Pepe Glover and completed adjuvant radiation therapy. She is currently maintained on exemestane. She had a bilateral 3D diagnostic mammogram on June 20, 2023 which was BI-RADS 2, category 1 density. She offers no new complaints today and there are no concerns on today's exam.  She feels that her left axillary seroma has decreased in size. We will see her back in 6 months or sooner should the need arise. She was instructed to contact us with any changes or concerns in the interim. All of her questions were answered today. History of Present Illness:     Oncology History   Malignant neoplasm of upper-outer quadrant of left breast in female, estrogen receptor positive ([address])   7/12/2021 Initial Diagnosis    Malignant neoplasm of upper-outer quadrant of left breast in female, estrogen receptor positive ([address])     7/12/2021 Biopsy    A. Breast, left at 02:00 o'clock 7 cm from nipple, ultrasound-guided biopsy (3 passes):  -  Invasive breast carcinoma of no special type (ductal NST/invasive ductal carcinoma).   - grade 1  - ER 95%, UT 0%, HER2 2+, equivocal by IHC but negative on the fish analysis    B. Breast, left at 02:00 o'clock 3 cm from nipple, ultrasound-guided biopsy (2 passes):  -  Benign breast parenchyma with fibrocystic changes including apocrine metaplasia, usual ductal hyperplasia, bold all and microcyst formation with a prominent sclerosed and cystic nodule, cannot exclude old granuloma versus sclerosed cyst.  -  Negative for dysplasia or carcinoma. 8/5/2021 -  Cancer Staged    Staging form: Breast, AJCC 8th Edition  - Clinical: Stage IA (cT1a, cN0, cM0, G1, ER+, CT-, HER2-) - Signed by Ashley Romano MD on 8/5/2021  Stage prefix: Initial diagnosis  Method of lymph node assessment: Clinical  Histologic grading system: 3 grade system       8/26/2021 Surgery    Left breast lumpectomy with SLNB  Surgeon: Dr David Bucio. Left breast, lumpectomy:  - Invasive breast carcinoma of no special type (ductal NST/invasive ductal carcinoma). - Ductal carcinoma in situ (DCIS). - All margins are negative for carcinoma or DCIS. B. Left breast sentinel lymph node, excision:  - One of three lymph nodes with micrometastases (pN1mi). Note: Small focus (1 mm) metastatic carcinoma present in permanent section only. C. Left breast, new superior margin, re-excision:  - Benign fibroadipose tissue.  - Negative for malignancy. D. Left breast, new medial margin, re-excision:  - Benign breast parenchyma and fibroadipose tissue.  - Negative for malignancy.      8/26/2021 -  Radiation    Treatments not in [address])  Field Numbers [employer] Treatment Site Starting  Ending  Elapsed  Fraction Total  Overlap Site Overlap         Date Date Days Dose Dose   Dose   IORT  50KVp  Left breast  8/26/21 8/26/21 2000 2000 9/9/2021 -  Cancer Staged    Staging form: Breast, AJCC 8th Edition  - Pathologic: Stage IA (pT1b, pN1mi(sn), cM0, G1, ER+, CT-, HER2-) - Signed by Ashley Romano MD on 9/9/2021  Stage prefix: Initial diagnosis  Method of lymph node assessment: [place] lymph node biopsy  Histologic grading system: 3 grade system       11/9/2021 - 12/14/2021 Radiation    BH L BREAST 6X 25 / 25 200 0 5,000 35      Treatment Dates:  11/9/2021 - 12/14/2021.           -Interval History: Patient presents today for follow-up visit. She notices no changes on herself breast exam.  She believes that her left axillary seroma has actually decreased in size. She continues on exemestane and reports some arthralgias primarily in her hands in the morning but is tolerating this quite well. She denies persistent headaches, back pain or bone pain, cough or shortness of breath, abdominal pain. She is currently being worked up for bladder prolapse and plans to have surgery at [facility] in the near future. Review of Systems:  Review of Systems   Constitutional: Negative for activity change, appetite change, chills, fatigue, fever and unexpected weight change. Respiratory: Negative for cough and shortness of breath. Cardiovascular: Negative for chest pain. Gastrointestinal: Negative for abdominal pain, constipation, diarrhea, nausea and vomiting. Genitourinary: Positive for difficulty urinating (being worked up for bladder prolapse- going to have surgery at [facility]). Musculoskeletal: Negative for arthralgias, back pain, gait problem and myalgias. Skin: Negative for color change and rash. Neurological: Negative for dizziness and headaches. Hematological: Negative for adenopathy. Psychiatric/Behavioral: Negative for agitation and confusion. All other systems reviewed and are negative.       Patient Active Problem List   Diagnosis   • Closed nondisplaced transverse fracture of right patella   • Numbness and tingling of left upper and lower extremity   • Essential hypertension   • TIA (transient ischemic attack)   • Malignant neoplasm of upper-outer quadrant of left breast in female, estrogen receptor positive ([address])   • Hyperlipidemia   • Anxiety   • Arthritis   • Seroma of breast   • Use of aromatase inhibitors   • Has never received COVID-19 vaccine     Past Medical History:   Diagnosis Date   • Acute URI    • Anxiety    • Arthritis    • Atypical chest pain    • Fibrocystic breast disease    • Hypercholesterolemia    • Osteopenia    • Palpitations    • Psoriasis    • Rib pain on left side    • Viral conjunctivitis      Past Surgical History:   Procedure Laterality Date   • BREAST BIOPSY     • BREAST LUMPECTOMY Left 8/26/2021    Procedure: BREAST CHELLY LOCALIZED LUMPECTOMY;  Surgeon: Domingo Garcia MD;  [facility];  Service: Surgical Oncology   • COLONOSCOPY     • FOOT SURGERY Left     cyst removal   • INTRAOPERATIVE RADIATION THERAPY (IORT) Left 8/26/2021    Procedure: BREAST INTRAOPERATIVE RADIATION THERAPY (IORT) BY DR SCANLON; Surgeon: Domingo Garcia MD;  [facility];  Service: Surgical Oncology   • LYMPH NODE BIOPSY Left 8/26/2021    Procedure: BIOPSY LYMPH NODE SENTINEL (NUC MED INJECTION AT 0900);   Surgeon: Domingo Garcia MD;  [facility];  Service: Surgical Oncology   • TONSILLECTOMY     • US BREAST NEEDLE LOC LEFT Left 8/19/2021   • US GUIDANCE BREAST BIOPSY LEFT EACH ADDITIONAL Left 7/12/2021   • US GUIDED BREAST BIOPSY LEFT COMPLETE Left 7/12/2021     Family History   Problem Relation Age of Onset   • Skin cancer Mother    • Skin cancer Father         age dx unk    • No Known Problems Daughter    • No Known Problems Maternal Grandmother    • Lung cancer Maternal Grandfather         age dx unk    • No Known Problems Paternal Grandmother    • No Known Problems Paternal Grandfather    • No Known Problems Maternal Aunt    • No Known Problems Maternal Aunt    • No Known Problems Maternal Aunt    • No Known Problems Paternal Aunt    • No Known Problems Cousin    • No Known Problems Cousin    • No Known Problems Cousin    • No Known Problems Cousin    • Breast cancer Neg Hx      Social History     Socioeconomic History   • Marital status: /Civil Union     Spouse name: Not on file   • Number of children: Not on file   • Years of education: Not on file   • Highest education level: Not on file   Occupational History   • Not on file   Tobacco Use   • Smoking status: Never   • Smokeless tobacco: Never   Vaping Use   • Vaping Use: Never used   Substance and Sexual Activity   • Alcohol use: Yes     Comment: Social Holidays   • Drug use: Not Currently   • Sexual activity: Yes     Partners: Male     Birth control/protection: None   Other Topics Concern   • Not on file   Social History Narrative   • Not on file     Social Determinants of Health     Financial Resource Strain: Not on file   Food Insecurity: Not on file   Transportation Needs: Not on file   Physical Activity: Not on file   Stress: Not on file   Social Connections: Not on file   Intimate Partner Violence: Not on file   Housing Stability: Not on file       Current Outpatient Medications:   •  atorvastatin (LIPITOR) 10 mg tablet, , Disp: , Rfl:   •  Cholecalciferol (VITAMIN D-3 PO), Take 2,000 Int'l Units by mouth daily, Disp: , Rfl:   •  exemestane (AROMASIN) 25 MG tablet, TAKE 1 TABLET (25 MG TOTAL) BY MOUTH DAILY. , Disp: 90 tablet, Rfl: 1  •  PARoxetine (PAXIL) 20 mg tablet, 20 mg every morning , Disp: , Rfl:   No Known Allergies  Vitals:    07/17/23 1518   BP: 130/80   Pulse: 65   Resp: 16   Temp: 97.5 °F (36.4 °C)   SpO2: 97%       Physical Exam  Vitals reviewed. Constitutional:       General: She is not in acute distress. Appearance: Normal appearance. She is well-developed. She is not diaphoretic. HENT:      Head: Normocephalic and atraumatic. Cardiovascular:      Rate and Rhythm: Normal rate and regular rhythm. Heart sounds: Normal heart sounds. Pulmonary:      Effort: Pulmonary effort is normal.      Breath sounds: Normal breath sounds. Chest:   Breasts:     Right: No swelling, bleeding, inverted nipple, mass, nipple discharge, skin change or tenderness.       Left: Mass (palpable seroma at lumpectomy site- stable vs improved) and skin change present. No swelling, bleeding, inverted nipple, nipple discharge or tenderness. Comments: Small probable suture granuloma at lateral aspect of axillary surgical scar- stable per patient  Abdominal:      Palpations: Abdomen is soft. There is no mass. Tenderness: There is no abdominal tenderness. Musculoskeletal:         General: Normal range of motion. Cervical back: Normal range of motion. Lymphadenopathy:      Upper Body:      Right upper body: No supraclavicular or axillary adenopathy. Left upper body: No supraclavicular or axillary adenopathy. Skin:     General: Skin is warm and dry. Findings: No rash. Neurological:      Mental Status: She is alert and oriented to person, place, and time. Psychiatric:         Speech: Speech normal.           Results:    Imaging    Mammo diagnostic bilateral w 3d & cad    Result Date: 6/20/2023  Narrative: DIAGNOSIS: Abnormal mammogram TECHNIQUE: Digital diagnostic mammography was performed. Computer Aided Detection (CAD) analyzed all applicable images. COMPARISONS: Prior breast imaging dated: 06/20/2022, 08/26/2021, 08/19/2021, 08/19/2021, 07/12/2021, 07/12/2021, 07/12/2021, 07/06/2021, 07/06/2021, 06/18/2021, 06/11/2020, 06/10/2019, 05/29/2018, 05/15/2017, 05/11/2016, 05/04/2015, and 04/28/2014 RELEVANT HISTORY: Family Breast Cancer History: History of breast cancer in Neg Hx. Family Medical History: No known relevant family medical history. Personal History: Hormone history includes birth control. Surgical history includes breast biopsy and lumpectomy. Medical history includes fibrocystic breast. RISK ASSESSMENT: 5 Year Tyrer-Cuzick: 0.7 % 10 Year Tyrer-Cuzick: 1.42 % Lifetime Tyrer-Cuzick: 2.6 % TISSUE DENSITY: The breasts are almost entirely fatty.  INDICATION: Becky Wilkins is a 75 y.o. female presenting for abnormal mammogram . FINDINGS: Bilateral There are no suspicious masses, grouped microcalcifications or areas of unexplained architectural distortion. The skin and nipple areolar complex are unremarkable. Stable postop changes on the left with large postop seroma in the left axillary tail region. Impression:  Stable exam. ASSESSMENT/BI-RADS CATEGORY:  Overall: 2 - Benign RECOMMENDATION:      - Diagnostic mammogram in 1 year for both breasts. Workstation ID: [id number]      I reviewed the above imaging data. Advance Care Planning/Advance Directives:  Discussed disease status, cancer treatment plans and/or cancer treatment goals with the patient.

## 2023-08-15 ENCOUNTER — CLINICAL SUPPORT (OUTPATIENT)
Dept: RADIATION ONCOLOGY | Facility: CLINIC | Age: 68
End: 2023-08-15
Attending: RADIOLOGY
Payer: MEDICARE

## 2023-08-15 VITALS
WEIGHT: 153 LBS | HEIGHT: 64 IN | DIASTOLIC BLOOD PRESSURE: 80 MMHG | HEART RATE: 87 BPM | OXYGEN SATURATION: 98 % | SYSTOLIC BLOOD PRESSURE: 150 MMHG | TEMPERATURE: 99.1 F | BODY MASS INDEX: 26.12 KG/M2 | RESPIRATION RATE: 18 BRPM

## 2023-08-15 DIAGNOSIS — C50.412 MALIGNANT NEOPLASM OF UPPER-OUTER QUADRANT OF LEFT BREAST IN FEMALE, ESTROGEN RECEPTOR POSITIVE (HCC): Primary | ICD-10-CM

## 2023-08-15 DIAGNOSIS — Z17.0 MALIGNANT NEOPLASM OF UPPER-OUTER QUADRANT OF LEFT BREAST IN FEMALE, ESTROGEN RECEPTOR POSITIVE (HCC): Primary | ICD-10-CM

## 2023-08-15 PROCEDURE — 99211 OFF/OP EST MAY X REQ PHY/QHP: CPT | Performed by: RADIOLOGY

## 2023-08-15 PROCEDURE — 99214 OFFICE O/P EST MOD 30 MIN: CPT | Performed by: RADIOLOGY

## 2023-08-15 NOTE — PROGRESS NOTES
Mabel Larsen 1955 is a 75 y.o. female with clinical stage I a left breast carcinoma, ER positive/VT negative and HER2 negative. She was  interested in proceeding with breast conservation surgery. She underwent lumpectomy with IORT on 8/26/2021. On final pathology she was found to have a 9 millimeter invasive ductal carcinoma with no EIC. Her margins of resection were negative. She did however have 1 lymph node which contained a micro metastasis. Based on her micro metastasis in the lymph node, adjuvant radiation therapy to the left breast was recommended with high tangential beams to cover the lower axilla. She completed a course of radiation to the L breast on 12/14/2021. She is maintained on exemestane. She was last seen on 7/28/22 and presents today for follow up. 5/3/23 Dr. Jessica Tucker   She is currently on adjuvant hormonal therapy with exemestane with improved musculoskeletal symptoms as opposed to when she was on anastrozole. Clinically, she has no evidence of recurrent disease. recommend her to continue exemestane 25 mg daily. Follow up 1 year    5/10/23 [facility] pelvic medicine  #1 Midline cystocele (Primary)  Assessment & Plan:  The patient elects to proceed with surgery--she will consider using the #5 ring pessary as needed when prolapse symptoms are more bothersome with coconut oil but is not an ideal long term solution. Gellhorn pessary is not ideal either as the patient is sexually active. Desires surgery in the fall/winter. She will schedule urodynamic testing and surgery discussion appointments as well as TVUS. 6/20/23 Diagnostic B/L mammogram  Bilateral  There are no suspicious masses, grouped microcalcifications or areas of unexplained architectural distortion. The skin and nipple areolar complex are unremarkable. Stable postop changes on the left with large postop seroma in the left axillary tail region.   RECOMMENDATION:       - Diagnostic mammogram in 1 year for both breasts. 7/17/23 Surg Onc, Vidya  She offers no new complaints today and there are no concerns on today's exam  She feels that her left axillary seroma has decreased in size  Follow up 6 months    Per pt surgery 10/27/23 for prolapse. Upcoming:  3/25/24 Dr. Bairon Balbuena  5/8/24 Dr. Jessica Tucker  6/25/24 Mammogram      Follow up visit     Oncology History   Malignant neoplasm of upper-outer quadrant of left breast in female, estrogen receptor positive ([address])   7/12/2021 Initial Diagnosis    Malignant neoplasm of upper-outer quadrant of left breast in female, estrogen receptor positive ([address])     7/12/2021 Biopsy    A. Breast, left at 02:00 o'clock 7 cm from nipple, ultrasound-guided biopsy (3 passes):  -  Invasive breast carcinoma of no special type (ductal NST/invasive ductal carcinoma). - grade 1  - ER 95%, MD 0%, HER2 2+, equivocal by IHC but negative on the fish analysis    B. Breast, left at 02:00 o'clock 3 cm from nipple, ultrasound-guided biopsy (2 passes):  -  Benign breast parenchyma with fibrocystic changes including apocrine metaplasia, usual ductal hyperplasia, bold all and microcyst formation with a prominent sclerosed and cystic nodule, cannot exclude old granuloma versus sclerosed cyst.  -  Negative for dysplasia or carcinoma. 8/5/2021 -  Cancer Staged    Staging form: Breast, AJCC 8th Edition  - Clinical: Stage IA (cT1a, cN0, cM0, G1, ER+, MD-, HER2-) - Signed by Loyda Sims MD on 8/5/2021  Stage prefix: Initial diagnosis  Method of lymph node assessment: Clinical  Histologic grading system: 3 grade system       8/26/2021 Surgery    Left breast lumpectomy with SLNB  Surgeon: Dr Mike Givens. Left breast, lumpectomy:  - Invasive breast carcinoma of no special type (ductal NST/invasive ductal carcinoma). - Ductal carcinoma in situ (DCIS). - All margins are negative for carcinoma or DCIS.      B. Left breast sentinel lymph node, excision:  - One of three lymph nodes with micrometastases (pN1mi). Note: Small focus (1 mm) metastatic carcinoma present in permanent section only. C. Left breast, new superior margin, re-excision:  - Benign fibroadipose tissue.  - Negative for malignancy. D. Left breast, new medial margin, re-excision:  - Benign breast parenchyma and fibroadipose tissue.  - Negative for malignancy. 8/26/2021 -  Radiation    Treatments not in [address])  Field Numbers Energy Treatment Site Starting  Ending  Elapsed  Fraction Total  Overlap Site Overlap         Date Date Days Dose Dose   Dose   IORT  50KVp  Left breast  8/26/21 8/26/21 2000 2000 9/9/2021 -  Cancer Staged    Staging form: Breast, AJCC 8th Edition  - Pathologic: Stage IA (pT1b, pN1mi(sn), cM0, G1, ER+, IA-, HER2-) - Signed by Ricardo Bain MD on 9/9/2021  Stage prefix: Initial diagnosis  Method of lymph node assessment: [place] lymph node biopsy  Histologic grading system: 3 grade system       11/9/2021 - 12/14/2021 Radiation    BH L BREAST 6X 25 / 25 200 0 5,000 35      Treatment Dates:  11/9/2021 - 12/14/2021. Review of Systems:  Review of Systems   Constitutional: Negative. Negative for appetite change, chills, fatigue, fever and unexpected weight change. HENT: Positive for tinnitus (bilateral left is worse). Eyes: Negative. Negative for visual disturbance. Wears glasses    Respiratory: Negative. Negative for cough and shortness of breath. Cardiovascular: Negative. Negative for chest pain and leg swelling. Gastrointestinal: Positive for constipation (off and on- eats more fiber). Endocrine: Positive for heat intolerance (mild hot flashes). Genitourinary: Positive for difficulty urinating (cystocele/ prolapsed). Negative for dysuria, hematuria and vaginal discharge. Musculoskeletal: Positive for arthralgias (mild bilateral hands). Skin: Negative. C/o having a lot of scar tissue under left axilla. Rare tenderness with bras if they are tight.  Denies swelling in breast or arm. Allergic/Immunologic: Negative. Neurological: Positive for numbness (numbness and tingling in bilateral hands). Negative for dizziness, weakness, light-headedness and headaches. Hematological: Negative. Psychiatric/Behavioral: Negative. Negative for sleep disturbance.        Clinical Trial: no    Teaching completed    Health Maintenance   Topic Date Due   • Hepatitis C Screening  Never done   • Medicare Annual Wellness Visit (AWV)  Never done   • COVID-19 Vaccine (1) Never done   • BMI: Followup Plan  Never done   • Colorectal Cancer Screening  Never done   • Osteoporosis Screening  Never done   • Urinary Incontinence Screening  Never done   • Fall Risk  05/09/2023   • Pneumococcal Vaccine: 65+ Years (2 - PCV) 06/20/2023   • Influenza Vaccine (1) 09/01/2023   • Breast Cancer Screening: Mammogram  06/20/2024   • BMI: Adult  07/17/2024   • Depression Screening  08/15/2024   • HIB Vaccine  Aged Out   • IPV Vaccine  Aged Out   • Hepatitis A Vaccine  Aged Out   • Meningococcal ACWY Vaccine  Aged Out   • HPV Vaccine  Aged Out     Patient Active Problem List   Diagnosis   • Closed nondisplaced transverse fracture of right patella   • Numbness and tingling of left upper and lower extremity   • Essential hypertension   • TIA (transient ischemic attack)   • Malignant neoplasm of upper-outer quadrant of left breast in female, estrogen receptor positive ([address])   • Hyperlipidemia   • Anxiety   • Arthritis   • Seroma of breast   • Use of aromatase inhibitors   • Has never received COVID-19 vaccine     Past Medical History:   Diagnosis Date   • Acute URI    • Anxiety    • Arthritis    • Atypical chest pain    • Breast cancer ([address])    • Fibrocystic breast disease    • Hypercholesterolemia    • Osteopenia    • Palpitations    • Psoriasis    • Rib pain on left side    • Skin cancer 01/2023    squamous cell of right arm lesion removed   • Viral conjunctivitis      Past Surgical History:   Procedure Laterality Date   • BREAST BIOPSY     • BREAST LUMPECTOMY Left 08/26/2021    Procedure: BREAST CHELLY LOCALIZED LUMPECTOMY;  Surgeon: Federico Lopez MD;  [facility];  Service: Surgical Oncology   • COLONOSCOPY     • FOOT SURGERY Left     cyst removal   • INTRAOPERATIVE RADIATION THERAPY (IORT) Left 08/26/2021    Procedure: BREAST INTRAOPERATIVE RADIATION THERAPY (IORT) BY DR SCANLON; Surgeon: Federico Lopez MD;  [facility];  Service: Surgical Oncology   • LYMPH NODE BIOPSY Left 08/26/2021    Procedure: BIOPSY LYMPH NODE SENTINEL (NUC MED INJECTION AT 0900);   Surgeon: Federico Lopez MD;  [facility];  Service: Surgical Oncology   • SKIN LESION EXCISION  01/2023    removed from right arm- squamous cell carcinoma   • TONSILLECTOMY     • US BREAST NEEDLE LOC LEFT Left 08/19/2021   • US GUIDANCE BREAST BIOPSY LEFT EACH ADDITIONAL Left 07/12/2021   • US GUIDED BREAST BIOPSY LEFT COMPLETE Left 07/12/2021     Family History   Problem Relation Age of Onset   • Skin cancer Mother    • Skin cancer Father         age dx unk    • No Known Problems Daughter    • No Known Problems Maternal Grandmother    • Lung cancer Maternal Grandfather         age dx unk    • No Known Problems Paternal Grandmother    • No Known Problems Paternal Grandfather    • No Known Problems Maternal Aunt    • No Known Problems Maternal Aunt    • No Known Problems Maternal Aunt    • No Known Problems Paternal Aunt    • No Known Problems Cousin    • No Known Problems Cousin    • No Known Problems Cousin    • No Known Problems Cousin    • Breast cancer Neg Hx      Social History     Socioeconomic History   • Marital status: /Civil Union     Spouse name: Not on file   • Number of children: Not on file   • Years of education: Not on file   • Highest education level: Not on file   Occupational History   • Not on file   Tobacco Use   • Smoking status: Never   • Smokeless tobacco: Never   Vaping Use   • Vaping Use: Never used Substance and Sexual Activity   • Alcohol use: Yes     Comment: Social Holidays   • Drug use: Not Currently   • Sexual activity: Yes     Partners: Male     Birth control/protection: None   Other Topics Concern   • Not on file   Social History Narrative   • Not on file     Social Determinants of Health     Financial Resource Strain: Not on file   Food Insecurity: Not on file   Transportation Needs: Not on file   Physical Activity: Not on file   Stress: Not on file   Social Connections: Not on file   Intimate Partner Violence: Not on file   Housing Stability: Not on file       Current Outpatient Medications:   •  atorvastatin (LIPITOR) 10 mg tablet, , Disp: , Rfl:   •  Cholecalciferol (VITAMIN D-3 PO), Take 2,000 Int'l Units by mouth daily, Disp: , Rfl:   •  exemestane (AROMASIN) 25 MG tablet, TAKE 1 TABLET (25 MG TOTAL) BY MOUTH DAILY. , Disp: 90 tablet, Rfl: 1  •  PARoxetine (PAXIL) 20 mg tablet, 20 mg every morning , Disp: , Rfl:   No Known Allergies  Vitals:    08/15/23 1051   BP: 150/80   BP Location: Right arm   Patient Position: Sitting   Cuff Size: Standard   Pulse: 87   Resp: 18   Temp: 99.1 °F (37.3 °C)   TempSrc: Temporal   SpO2: 98%   Weight: 69.4 kg (153 lb)   Height: 5' 4" (1.626 m)      Pain Score: 0-No pain

## 2023-08-15 NOTE — PROGRESS NOTES
Follow-up - Radiation Oncology   Amadou Malcolm 1955 75 y.o. female [id number]      History of Present Illness   Cancer Staging   Malignant neoplasm of upper-outer quadrant of left breast in female, estrogen receptor positive ([address])  Staging form: Breast, AJCC 8th Edition  - Clinical: Stage IA (cT1a, cN0, cM0, G1, ER+, MO-, HER2-) - Signed by Marichuy Turner MD on 8/5/2021  Stage prefix: Initial diagnosis  Method of lymph node assessment: Clinical  Histologic grading system: 3 grade system  - Pathologic: Stage IA (pT1b, pN1mi(sn), cM0, G1, ER+, MO-, HER2-) - Signed by Marichuy Turner MD on 9/9/2021  Stage prefix: Initial diagnosis  Method of lymph node assessment: [place] lymph node biopsy  Histologic grading system: 3 grade system          Interval History:  Amadou Malcolm 1955 is a 75 y.o. female with clinical stage I a left breast carcinoma, ER positive/MO negative and HER2 negative. She was  interested in proceeding with breast conservation surgery. She underwent lumpectomy with IORT on 8/26/2021.     On final pathology she was found to have a 9 millimeter invasive ductal carcinoma with no EIC.  Her margins of resection were negative.  She did however have 1 lymph node which contained a micro metastasis. Based on her micro metastasis in the lymph node, adjuvant radiation therapy to the left breast was recommended with high tangential beams to cover the lower axilla.  She completed a course of radiation to the L breast on 12/14/2021. She is maintained on exemestane. She was last seen on 7/28/22 and presents today for follow up.     5/3/23 Dr. Luis Rangel is currently on adjuvant hormonal therapy with exemestane with improved musculoskeletal symptoms as opposed to when she was on anastrozole.    Clinically, she has no evidence of recurrent disease.    recommend her to continue exemestane 25 mg daily.    Follow up 1 year     5/10/23 [facility] pelvic medicine  #1 Midline cystocele (Primary)  Assessment & Plan:  The patient elects to proceed with surgery--she will consider using the #5 ring pessary as needed when prolapse symptoms are more bothersome with coconut oil but is not an ideal long term solution. Gellhorn pessary is not ideal either as the patient is sexually active. Desires surgery in the fall/winter. She will schedule urodynamic testing and surgery discussion appointments as well as TVUS.       6/20/23 Diagnostic B/L mammogram  Bilateral  There are no suspicious masses, grouped microcalcifications or areas of unexplained architectural distortion. The skin and nipple areolar complex are unremarkable.    Stable postop changes on the left with large postop seroma in the left axillary tail region. RECOMMENDATION:       - Diagnostic mammogram in 1 year for both breasts.     7/17/23 Surg OncVidya  She offers no new complaints today and there are no concerns on today's exam  She feels that her left axillary seroma has decreased in size  Follow up 6 months        Upcoming:  3/25/24 Dr. Dipak Carbajal  5/8/24 Dr. Vikas Kennedy  6/25/24 Mammogram      Historical Information   Oncology History   Malignant neoplasm of upper-outer quadrant of left breast in female, estrogen receptor positive ([address])   7/12/2021 Initial Diagnosis    Malignant neoplasm of upper-outer quadrant of left breast in female, estrogen receptor positive ([address])     7/12/2021 Biopsy    A. Breast, left at 02:00 o'clock 7 cm from nipple, ultrasound-guided biopsy (3 passes):  -  Invasive breast carcinoma of no special type (ductal NST/invasive ductal carcinoma). - grade 1  - ER 95%, WI 0%, HER2 2+, equivocal by IHC but negative on the fish analysis    B.  Breast, left at 02:00 o'clock 3 cm from nipple, ultrasound-guided biopsy (2 passes):  -  Benign breast parenchyma with fibrocystic changes including apocrine metaplasia, usual ductal hyperplasia, bold all and microcyst formation with a prominent sclerosed and cystic nodule, cannot exclude old granuloma versus sclerosed cyst.  -  Negative for dysplasia or carcinoma. 8/5/2021 -  Cancer Staged    Staging form: Breast, AJCC 8th Edition  - Clinical: Stage IA (cT1a, cN0, cM0, G1, ER+, [place]-, HER2-) - Signed by Ignacia Baum MD on 8/5/2021  Stage prefix: Initial diagnosis  Method of lymph node assessment: Clinical  Histologic grading system: 3 grade system       8/26/2021 Surgery    Left breast lumpectomy with SLNB  Surgeon: Dr Sabine Shanks. Left breast, lumpectomy:  - Invasive breast carcinoma of no special type (ductal NST/invasive ductal carcinoma). - Ductal carcinoma in situ (DCIS). - All margins are negative for carcinoma or DCIS. B. Left breast sentinel lymph node, excision:  - One of three lymph nodes with micrometastases (pN1mi). Note: Small focus (1 mm) metastatic carcinoma present in permanent section only. C. Left breast, new superior margin, re-excision:  - Benign fibroadipose tissue.  - Negative for malignancy. D. Left breast, new medial margin, re-excision:  - Benign breast parenchyma and fibroadipose tissue.  - Negative for malignancy. 8/26/2021 -  Radiation    Treatments not in [address])  Field Numbers Energy Treatment Site Starting  Ending  Elapsed  Fraction Total  Overlap Site Overlap         Date Date Days Dose Dose   Dose   IORT  50KVp  Left breast  8/26/21 8/26/21 2000 2000 9/9/2021 -  Cancer Staged    Staging form: Breast, AJCC 8th Edition  - Pathologic: Stage IA (pT1b, pN1mi(sn), cM0, G1, ER+, WY-, HER2-) - Signed by Ignacia Baum MD on 9/9/2021  Stage prefix: Initial diagnosis  Method of lymph node assessment: [place] lymph node biopsy  Histologic grading system: 3 grade system       11/9/2021 - 12/14/2021 Radiation    BH L BREAST 6X 25 / 25 200 0 5,000 35      Treatment Dates:  11/9/2021 - 12/14/2021.           Past Medical History:   Diagnosis Date   • Acute URI    • Anxiety    • Arthritis    • Atypical chest pain    • Breast cancer [facility])    • Fibrocystic breast disease    • Hypercholesterolemia    • Osteopenia    • Palpitations    • Psoriasis    • Rib pain on left side    • Skin cancer 01/2023    squamous cell of right arm lesion removed   • Viral conjunctivitis      Past Surgical History:   Procedure Laterality Date   • BREAST BIOPSY     • BREAST LUMPECTOMY Left 08/26/2021    Procedure: BREAST CHELLY LOCALIZED LUMPECTOMY;  Surgeon: Sheeba Pina MD;  [facility];  Service: Surgical Oncology   • COLONOSCOPY     • FOOT SURGERY Left     cyst removal   • INTRAOPERATIVE RADIATION THERAPY (IORT) Left 08/26/2021    Procedure: BREAST INTRAOPERATIVE RADIATION THERAPY (IORT) BY DR SCANLON; Surgeon: Sheeba Pina MD;  [facility];  Service: Surgical Oncology   • LYMPH NODE BIOPSY Left 08/26/2021    Procedure: BIOPSY LYMPH NODE SENTINEL (NUC MED INJECTION AT 0900); Surgeon: Sheeba Pina MD;  [facility];  Service: Surgical Oncology   • SKIN LESION EXCISION  01/2023    removed from right arm- squamous cell carcinoma   • TONSILLECTOMY     • US BREAST NEEDLE LOC LEFT Left 08/19/2021   • US GUIDANCE BREAST BIOPSY LEFT EACH ADDITIONAL Left 07/12/2021   • US GUIDED BREAST BIOPSY LEFT COMPLETE Left 07/12/2021       Social History   Social History     Substance and Sexual Activity   Alcohol Use Yes    Comment: Social Holidays     Social History     Substance and Sexual Activity   Drug Use Not Currently     Social History     Tobacco Use   Smoking Status Never   Smokeless Tobacco Never         Meds/Allergies     Current Outpatient Medications:   •  atorvastatin (LIPITOR) 10 mg tablet, , Disp: , Rfl:   •  Cholecalciferol (VITAMIN D-3 PO), Take 2,000 Int'l Units by mouth daily, Disp: , Rfl:   •  exemestane (AROMASIN) 25 MG tablet, TAKE 1 TABLET (25 MG TOTAL) BY MOUTH DAILY. , Disp: 90 tablet, Rfl: 1  •  PARoxetine (PAXIL) 20 mg tablet, 20 mg every morning , Disp: , Rfl:   No Known Allergies      Review of Systems   Constitutional: Negative.   Negative for appetite change, chills, fatigue, fever and unexpected weight change. HENT: Positive for tinnitus (bilateral left is worse). Eyes: Negative. Negative for visual disturbance. Wears glasses    Respiratory: Negative. Negative for cough and shortness of breath. Cardiovascular: Negative. Negative for chest pain and leg swelling. Gastrointestinal: Positive for constipation (off and on- eats more fiber). Endocrine: Positive for heat intolerance (mild hot flashes). Genitourinary: Positive for difficulty urinating (cystocele/ prolapsed). Negative for dysuria, hematuria and vaginal discharge. Musculoskeletal: Positive for arthralgias (mild bilateral hands). Skin: Negative. C/o having a lot of scar tissue under left axilla. Rare tenderness with bras if they are tight. Denies swelling in breast or arm. Allergic/Immunologic: Negative. Neurological: Positive for numbness (numbness and tingling in bilateral hands). Negative for dizziness, weakness, light-headedness and headaches. Hematological: Negative. Psychiatric/Behavioral: Negative. Negative for sleep disturbance. OBJECTIVE:   /80 (BP Location: Right arm, Patient Position: Sitting, Cuff Size: Standard)   Pulse 87   Temp 99.1 °F (37.3 °C) (Temporal)   Resp 18   Ht 5' 4" (1.626 m)   Wt 69.4 kg (153 lb)   SpO2 98%   BMI 26.26 kg/m²   Pain Assessment:  0  ECOG/Zubrod/WHO: 0 - Asymptomatic    Physical Exam    There is no supraclavicular or axillary adenopathy palpable. The skin in the radiated field is in good condition. She does have fibrosis in the lateral left breast at the primary site. No suspicious lesions noted in either breast.  Abdomen soft nontender. Her breathing is unlabored. Conversing appropriately. Ambulating independently. RESULTS    Lab Results: No results found for this or any previous visit (from the past 672 hour(s)). Imaging Studies:No results found.         Assessment/Plan:  No orders of the defined types were placed in this encounter. Sophie Solorio is a 75y.o. year old female with stage I left breast carcinoma. She received IORT followed by left breast external beam radiation. She has no clinical evidence of recurrence. She is on exemestane with better tolerance. Mammogram from last month returned stable. She will return for follow-up visit in 1 year      Omaira Tucker MD  8/15/2023,11:12 AM    Portions of the record may have been created with voice recognition software.  Occasional wrong word or "sound a like" substitutions may have occurred due to the inherent limitations of voice recognition software.  Read the chart carefully and recognize, using context, where substitutions have occurred.

## 2023-10-13 ENCOUNTER — TELEPHONE (OUTPATIENT)
Dept: HEMATOLOGY ONCOLOGY | Facility: CLINIC | Age: 68
End: 2023-10-13

## 2023-10-13 NOTE — TELEPHONE ENCOUNTER
Left VM for patient to contact the office to reschedule her appointment with Dr. Anel Thomson to another provider.

## 2023-10-18 ENCOUNTER — TELEPHONE (OUTPATIENT)
Dept: HEMATOLOGY ONCOLOGY | Facility: CLINIC | Age: 68
End: 2023-10-18

## 2023-10-18 NOTE — TELEPHONE ENCOUNTER
Appointment Change  Cancel, Reschedule, Change to Virtual      Who are you speaking with? Patient   If it is not the patient, is the caller listed on the communication consent form? Yes   Which provider is the appointment scheduled with? Dr. Rafi Sheehan   When was the original appointment scheduled? Please list date and time 5/8 1:20   At which location is the appointment scheduled to take place? [facility]   Was the appointment rescheduled? Was the appointment changed from an in person visit to a virtual visit? If so, please list the details of the change. 5/8 1:20pm Kiersten Yo   What is the reason for the appointment change? WARREN       Was STAR transport scheduled? No   Does STAR transport need to be scheduled for the new visit (if applicable) No   Does the patient need an infusion appointment rescheduled? No   Does the patient have an upcoming infusion appointment scheduled? If so, when? No   Is the patient undergoing chemotherapy? No   For appointments cancelled with less than 24 hours:  Was the no-show policy reviewed?  Yes

## 2023-11-03 ENCOUNTER — TELEPHONE (OUTPATIENT)
Dept: HEMATOLOGY ONCOLOGY | Facility: CLINIC | Age: 68
End: 2023-11-03

## 2023-11-03 NOTE — TELEPHONE ENCOUNTER
Patient made aware that other areas of the body can still produce small amounts of Estrogen, including the breast cancer itself in some cases. Patient encouraged to continue taking the Exemestane as prescribed. Patient verbally understood.

## 2023-11-03 NOTE — TELEPHONE ENCOUNTER
Patient Call    Who are you speaking with? Patient    If it is not the patient, are they listed on an active communication consent form? N/A   What is the reason for this call? Patient calling in wanting to know if she still needs to take the exemestane, given she had surgery and had her ovaries removed. Does this require a call back? Yes   If a call back is required, please list best call back number [phone]   If a call back is required, advise that a message will be forwarded to their care team and someone will return their call as soon as possible. Did you relay this information to the patient?  Yes

## 2023-11-13 DIAGNOSIS — Z17.0 MALIGNANT NEOPLASM OF UPPER-OUTER QUADRANT OF LEFT BREAST IN FEMALE, ESTROGEN RECEPTOR POSITIVE: ICD-10-CM

## 2023-11-13 DIAGNOSIS — C50.412 MALIGNANT NEOPLASM OF UPPER-OUTER QUADRANT OF LEFT BREAST IN FEMALE, ESTROGEN RECEPTOR POSITIVE: ICD-10-CM

## 2023-11-13 RX ORDER — EXEMESTANE 25 MG/1
25 TABLET ORAL DAILY
Qty: 90 TABLET | Refills: 1 | Status: SHIPPED | OUTPATIENT
Start: 2023-11-13

## 2024-01-24 ENCOUNTER — TELEPHONE (OUTPATIENT)
Age: 69
End: 2024-01-24

## 2024-02-19 ENCOUNTER — APPOINTMENT (OUTPATIENT)
Dept: LAB | Facility: CLINIC | Age: 69
End: 2024-02-19

## 2024-02-19 DIAGNOSIS — Z11.1 TUBERCULOSIS SCREENING: ICD-10-CM

## 2024-02-19 DIAGNOSIS — Z01.84 IMMUNITY STATUS TESTING: ICD-10-CM

## 2024-02-19 LAB
MEV IGG SER QL IA: NORMAL
MUV IGG SER QL IA: NORMAL
RUBV IGG SERPL IA-ACNC: 36.3 IU/ML
VZV IGG SER QL IA: NORMAL

## 2024-02-19 PROCEDURE — 36415 COLL VENOUS BLD VENIPUNCTURE: CPT

## 2024-02-19 PROCEDURE — 86480 TB TEST CELL IMMUN MEASURE: CPT

## 2024-02-19 PROCEDURE — 86787 VARICELLA-ZOSTER ANTIBODY: CPT

## 2024-02-19 PROCEDURE — 86765 RUBEOLA ANTIBODY: CPT

## 2024-02-19 PROCEDURE — 86762 RUBELLA ANTIBODY: CPT

## 2024-02-19 PROCEDURE — 86735 MUMPS ANTIBODY: CPT

## 2024-02-20 LAB
GAMMA INTERFERON BACKGROUND BLD IA-ACNC: 0.06 IU/ML
M TB IFN-G BLD-IMP: NEGATIVE
M TB IFN-G CD4+ BCKGRND COR BLD-ACNC: -0.02 IU/ML
M TB IFN-G CD4+ BCKGRND COR BLD-ACNC: 0.01 IU/ML
MITOGEN IGNF BCKGRD COR BLD-ACNC: 7.18 IU/ML

## 2024-03-06 ENCOUNTER — OFFICE VISIT (OUTPATIENT)
Dept: GASTROENTEROLOGY | Facility: AMBULARY SURGERY CENTER | Age: 69
End: 2024-03-06
Payer: MEDICARE

## 2024-03-06 VITALS
DIASTOLIC BLOOD PRESSURE: 74 MMHG | HEIGHT: 64 IN | BODY MASS INDEX: 26.19 KG/M2 | SYSTOLIC BLOOD PRESSURE: 134 MMHG | WEIGHT: 153.4 LBS | HEART RATE: 83 BPM | OXYGEN SATURATION: 98 %

## 2024-03-06 DIAGNOSIS — Z12.11 COLON CANCER SCREENING: Primary | ICD-10-CM

## 2024-03-06 PROCEDURE — G2211 COMPLEX E/M VISIT ADD ON: HCPCS | Performed by: PHYSICIAN ASSISTANT

## 2024-03-06 PROCEDURE — 99203 OFFICE O/P NEW LOW 30 MIN: CPT | Performed by: PHYSICIAN ASSISTANT

## 2024-03-06 NOTE — PROGRESS NOTES
[facility] - Outpatient Consultation  nA Mcwilliams 68 y.o. female MRN: [id number]  Encounter: [id number]          ASSESSMENT AND PLAN:      1. Colon cancer screening  Appears at average colorectal cancer risk, no alarm symptoms at this time the patient has not had colonoscopy for approximately 12 years, rule out adenomatous polyps or malignancy    -Procedures were explained in detail to the patient at this time including associated risks and benefits, risks including but not limited to infection, perforation and bleeding    -Will schedule patient for colonoscopy    -Instructions provided for colonoscopy prep    ______________________________________________________________________    HPI: 68-year-old female with history of breast cancer status postlumpectomy and radiation in 2021, pelvic reconstruction surgery last fall who presents for evaluation, she says she was recommended to have colon cancer screening done.  She says she had 1 colonoscopy in the past in 2012 which was normal and for which a 10-year follow-up had been recommended, she denies any family history of colon cancer to her knowledge.  She otherwise denies any disturbances in her bowel habits or any change in her stool appearance including any rectal bleeding or melena, denies any unintentional weight loss, reports her appetite has been good, denies any problems with acid reflux or heartburn, occasionally has reflux when having an identifiable triggers such as red sauce or wine, though she says that she does not drink alcohol regularly and only moderately and sporadically.  Denies regular use of NSAIDs.  Denies any known history of heart or lung conditions, any CPAP/BiPAP dependence or use of supplemental oxygen.      REVIEW OF SYSTEMS:    CONSTITUTIONAL: Denies any fever, chills, rigors, and weight loss.  HEENT: No earache or tinnitus. Denies hearing loss or visual disturbances.  CARDIOVASCULAR: No chest pain or  palpitations.   RESPIRATORY: Denies any cough, hemoptysis, shortness of breath or dyspnea on exertion.  GASTROINTESTINAL: As noted in the History of Present Illness.   GENITOURINARY: No problems with urination. Denies any hematuria or dysuria.  NEUROLOGIC: No dizziness or vertigo, denies headaches.   MUSCULOSKELETAL: Denies any muscle or joint pain.   SKIN: Denies skin rashes or itching.   ENDOCRINE: Denies excessive thirst. Denies intolerance to heat or cold.  PSYCHOSOCIAL: Denies depression or anxiety. Denies any recent memory loss.       Historical Information   Past Medical History:   Diagnosis Date    Acute URI     Anxiety     Arthritis     Atypical chest pain     Breast cancer (HCC)     Fibrocystic breast disease     Hypercholesterolemia     Osteopenia     Palpitations     Psoriasis     Rib pain on left side     Skin cancer 01/2023    squamous cell of right arm lesion removed    Viral conjunctivitis      Past Surgical History:   Procedure Laterality Date    BREAST BIOPSY      BREAST LUMPECTOMY Left 08/26/2021    Procedure: BREAST CHELLY LOCALIZED LUMPECTOMY;  Surgeon: Tracie Nath MD;  [facility];  Service: Surgical Oncology    COLONOSCOPY      FOOT SURGERY Left     cyst removal    INTRAOPERATIVE RADIATION THERAPY (IORT) Left 08/26/2021    Procedure: BREAST INTRAOPERATIVE RADIATION THERAPY (IORT) BY DR SCANLON;  Surgeon: Tracie Nath MD;  [facility];  Service: Surgical Oncology    LYMPH NODE BIOPSY Left 08/26/2021    Procedure: BIOPSY LYMPH NODE SENTINEL (NUC MED INJECTION AT 0900);  Surgeon: Tracie Nath MD;  [facility];  Service: Surgical Oncology    SKIN LESION EXCISION  01/2023    removed from right arm- squamous cell carcinoma    TONSILLECTOMY      US BREAST NEEDLE LOC LEFT Left 08/19/2021    US GUIDANCE BREAST BIOPSY LEFT EACH ADDITIONAL Left 07/12/2021    US GUIDED BREAST BIOPSY LEFT COMPLETE Left 07/12/2021     Social History   Social History     Substance and Sexual Activity  "  Alcohol Use Yes    Comment: Social Holidays     Social History     Substance and Sexual Activity   Drug Use Not Currently     Social History     Tobacco Use   Smoking Status Never   Smokeless Tobacco Never     Family History   Problem Relation Age of Onset    Skin cancer Mother     Skin cancer Father         age dx unk     No Known Problems Daughter     No Known Problems Maternal Grandmother     Lung cancer Maternal Grandfather         age dx unk     No Known Problems Paternal Grandmother     No Known Problems Paternal Grandfather     No Known Problems Maternal Aunt     No Known Problems Maternal Aunt     No Known Problems Maternal Aunt     No Known Problems Paternal Aunt     No Known Problems Cousin     No Known Problems Cousin     No Known Problems Cousin     No Known Problems Cousin     Breast cancer Neg Hx        Meds/Allergies       Current Outpatient Medications:     atorvastatin (LIPITOR) 10 mg tablet    Cholecalciferol (VITAMIN D-3 PO)    exemestane (AROMASIN) 25 MG tablet    PARoxetine (PAXIL) 20 mg tablet    No Known Allergies        Objective     Blood pressure 134/74, pulse 83, height 5' 4\" (1.626 m), weight 69.6 kg (153 lb 6.4 oz), SpO2 98%, not currently breastfeeding. Body mass index is 26.33 kg/m².        PHYSICAL EXAM:      General Appearance:   Alert, cooperative, no distress   HEENT:   Normocephalic, atraumatic, anicteric.     Neck:  Supple, symmetrical, trachea midline   Lungs:   Clear to auscultation bilaterally; no rales, rhonchi or wheezing; respirations unlabored    Heart::   Regular rate and rhythm; no murmur, rub, or gallop.   Abdomen:   Soft, non-tender, non-distended; normal bowel sounds; no masses, no organomegaly    Genitalia:   Deferred    Rectal:   Deferred    Extremities:  No cyanosis, clubbing or edema    Pulses:  2+ and symmetric    Skin:  No jaundice, rashes, or lesions    Lymph nodes:  No palpable cervical lymphadenopathy        Lab Results:   No visits with results within 1 " Day(s) from this visit.   Latest known visit with results is:   Appointment on 02/19/2024   Component Date Value    Rubeola IgG 02/19/2024 IMMUNE     Mumps IgG 02/19/2024 IMMUNE     Rubella IgG Quant 02/19/2024 36.3     Varicella IgG 02/19/2024 IMMUNE     QFT Nil 02/19/2024 0.06     QFT TB1-NIL 02/19/2024 0.01     QFT TB2-NIL 02/19/2024 -0.02     QFT Mitogen-NIL 02/19/2024 7.18     QFT Final Interpretation 02/19/2024 Negative          Radiology Results:   No results found.

## 2024-03-06 NOTE — PATIENT INSTRUCTIONS
Scheduled date of colonoscopy (as of today): May 17, 2024  Physician performing colonoscopy: Dr. Dc  Location of colonoscopy: [facility]  Bowel prep reviewed with patient: Miralax/Dulcolax  Instructions reviewed with patient by: Arabella  Clearances: N/A

## 2024-03-25 ENCOUNTER — TELEPHONE (OUTPATIENT)
Dept: HEMATOLOGY ONCOLOGY | Facility: CLINIC | Age: 69
End: 2024-03-25

## 2024-03-25 NOTE — TELEPHONE ENCOUNTER
Appointment Change  Cancel, Reschedule, Change to Virtual      Who are you speaking with? Patient   If it is not the patient, is the caller listed on the communication consent form? Yes   Which provider is the appointment scheduled with? RY Pickens   When was the original appointment scheduled?    Please list date and time 4/4/24   At which location is the appointment scheduled to take place? [place]   Was the appointment rescheduled?     Was the appointment changed from an in person visit to a virtual visit?    If so, please list the details of the change. 4/8/24   What is the reason for the appointment change? Sooner appt           Appointment Change  Cancel, Reschedule, Change to Virtual      Who are you speaking with? Patient   If it is not the patient, is the caller listed on the communication consent form? Yes   Which provider is the appointment scheduled with? Dr. Noland   When was the original appointment scheduled?    Please list date and time 5/8/24   At which location is the appointment scheduled to take place? [place]   Was the appointment rescheduled?     Was the appointment changed from an in person visit to a virtual visit?    If so, please list the details of the change. 5/7/24   What is the reason for the appointment change? Provider location

## 2024-04-08 ENCOUNTER — OFFICE VISIT (OUTPATIENT)
Dept: SURGICAL ONCOLOGY | Facility: CLINIC | Age: 69
End: 2024-04-08
Payer: MEDICARE

## 2024-04-08 VITALS
SYSTOLIC BLOOD PRESSURE: 138 MMHG | TEMPERATURE: 98.3 F | BODY MASS INDEX: 26.22 KG/M2 | RESPIRATION RATE: 16 BRPM | WEIGHT: 153.6 LBS | HEART RATE: 63 BPM | OXYGEN SATURATION: 98 % | HEIGHT: 64 IN | DIASTOLIC BLOOD PRESSURE: 84 MMHG

## 2024-04-08 DIAGNOSIS — C50.412 MALIGNANT NEOPLASM OF UPPER-OUTER QUADRANT OF LEFT BREAST IN FEMALE, ESTROGEN RECEPTOR POSITIVE (HCC): Primary | ICD-10-CM

## 2024-04-08 DIAGNOSIS — Z17.0 MALIGNANT NEOPLASM OF UPPER-OUTER QUADRANT OF LEFT BREAST IN FEMALE, ESTROGEN RECEPTOR POSITIVE (HCC): Primary | ICD-10-CM

## 2024-04-08 DIAGNOSIS — Z79.811 USE OF AROMATASE INHIBITORS: ICD-10-CM

## 2024-04-08 PROCEDURE — 99213 OFFICE O/P EST LOW 20 MIN: CPT | Performed by: NURSE PRACTITIONER

## 2024-04-08 NOTE — PATIENT INSTRUCTIONS
Insomnia   AMBULATORY CARE:   Insomnia  is a condition that makes it hard to fall or stay asleep. Lack of sleep can lead to attention or memory problems during the day. You may also be oleary, depressed, clumsy, or have headaches.  Contact your healthcare provider if:   Your symptoms do not get better, or they get worse.    You begin to use drugs or alcohol to fall asleep.    You have questions or concerns about your condition or care.    Medicines:   Medicines  may help you sleep more regularly or help you feel less anxious.    Take your medicine as directed.  Contact your healthcare provider if you think your medicine is not helping or if you have side effects. Tell your provider if you are allergic to any medicine. Keep a list of the medicines, vitamins, and herbs you take. Include the amounts, and when and why you take them. Bring the list or the pill bottles to follow-up visits. Carry your medicine list with you in case of an emergency.    What you can do to improve your sleep:   Create a sleep schedule.  Try to go to sleep and wake up at the same times every day. Keep a record of your sleep patterns, and any sleeping problems you have. Bring the record to follow-up visits with healthcare providers.    Do not take naps.  Naps could make it hard for you to fall asleep at bedtime.    Keep your bedroom cool, quiet, and dark.  Turn on white noise, such as a fan, to help you relax. Do not use your bed for any activity that will keep you awake. Do not read, exercise, eat, or watch TV in your bedroom.     Get up if you do not fall asleep within 20 minutes.  Move to another room and do something relaxing until you become sleepy.    Limit caffeine, alcohol, and food to earlier in the day.  Only drink caffeine in the morning. Do not drink alcohol within 6 hours of bedtime. Do not eat a heavy meal right before you go to bed.    Exercise regularly.  Daily exercise may help you sleep better. Do not exercise within 4 hours of  bedtime.    Follow up with your healthcare provider as directed:  Your healthcare provider may refer you for cognitive behavioral therapy. A behavioral therapist may help you find ways to relax, decrease stress, and improve sleep. Write down your questions so you remember to ask them during your visits.   © Copyright [employer] 2023 Information is for End User's use only and may not be sold, redistributed or otherwise used for commercial purposes.  The above information is an  only. It is not intended as medical advice for individual conditions or treatments. Talk to your doctor, nurse or pharmacist before following any medical regimen to see if it is safe and effective for you.

## 2024-04-08 NOTE — PROGRESS NOTES
Surgical Oncology Follow Up       [address]  [facility] [place]  [address]  [facility] [id number]    An D Oj  1955  [id number]  [address]  [facility] [place]  [address] [place] [address]    Chief Complaint   Patient presents with    Follow-up       Assessment/Plan:  1. Malignant neoplasm of upper-outer quadrant of left breast in female, estrogen receptor positive (HCC)  - 6 mo f/u visit    2. Use of aromatase inhibitors  - Continue use per medical oncology      Discussion/Summary: Patient is a 68-year-old female that was diagnosed with a left-sided breast cancer in July 2021.  Her pathology revealed invasive ductal carcinoma, ER positive, MD negative, HER2 negative.  She underwent a left lumpectomy and sentinel node biopsy with Dr. Nath and completed adjuvant radiation therapy.  She is currently maintained on exemestane.  She had a bilateral 3D diagnostic mammogram on June 20, 2023 which was BI-RADS 2, category 1 density.  She reports some insomnia which she attributes to the exemestane but otherwise offers no new complaints today and there are no worrisome findings on today's clinical exam.  She is already scheduled for a mammogram in June.  We will see her back in 6 months for a follow-up visit or sooner should the need arise.  She was instructed to contact us with any changes or concerns in the interim.  All of her questions were answered today.      History of Present Illness:     Oncology History   Malignant neoplasm of upper-outer quadrant of left breast in female, estrogen receptor positive (HCC)   7/12/2021 Initial Diagnosis    Malignant neoplasm of upper-outer quadrant of left breast in female, estrogen receptor positive (HCC)     7/12/2021 Biopsy    A. Breast, left at 02:00 o'clock 7 cm from nipple, ultrasound-guided biopsy (3 passes):  -  Invasive breast carcinoma of no special type (ductal  NST/invasive ductal carcinoma).  - grade 1  - ER 95%, KY 0%, HER2 2+, equivocal by IHC but negative on the fish analysis    B. Breast, left at 02:00 o'clock 3 cm from nipple, ultrasound-guided biopsy (2 passes):  -  Benign breast parenchyma with fibrocystic changes including apocrine metaplasia, usual ductal hyperplasia, bold all and microcyst formation with a prominent sclerosed and cystic nodule, cannot exclude old granuloma versus sclerosed cyst.  -  Negative for dysplasia or carcinoma.      8/5/2021 -  Cancer Staged    Staging form: Breast, AJCC 8th Edition  - Clinical: Stage IA (cT1a, cN0, cM0, G1, ER+, KY-, HER2-) - Signed by Tracie Nath MD on 8/5/2021  Stage prefix: Initial diagnosis  Method of lymph node assessment: Clinical  Histologic grading system: 3 grade system       8/26/2021 Surgery    Left breast lumpectomy with SLNB  Surgeon: Dr Tracie Nath    A. Left breast, lumpectomy:  - Invasive breast carcinoma of no special type (ductal NST/invasive ductal carcinoma).  - Ductal carcinoma in situ (DCIS).  - All margins are negative for carcinoma or DCIS.     B. Left breast sentinel lymph node, excision:  - One of three lymph nodes with micrometastases (pN1mi).     Note: Small focus (1 mm) metastatic carcinoma present in permanent section only.     C. Left breast, new superior margin, re-excision:  - Benign fibroadipose tissue.  - Negative for malignancy.     D. Left breast, new medial margin, re-excision:  - Benign breast parenchyma and fibroadipose tissue.  - Negative for malignancy.     8/26/2021 -  Radiation    Treatments not in [facility])  Field Numbers Energy Treatment Site Starting  Ending  Elapsed  Fraction Total  Overlap Site Overlap         Date Date Days Dose Dose   Dose   IORT  50KVp  Left breast  8/26/21 8/26/21 2000 2000 9/9/2021 -  Cancer Staged    Staging form: Breast, AJCC 8th Edition  - Pathologic: Stage IA (pT1b, pN1mi(sn), cM0, G1, ER+, KY-, HER2-) - Signed by Tracie Nath MD  on 9/9/2021  Stage prefix: Initial diagnosis  Method of lymph node assessment: [place] lymph node biopsy  Histologic grading system: 3 grade system       11/9/2021 - 12/14/2021 Radiation    BH L BREAST 6X 25 / 25 200 0 5,000 35      Treatment Dates:  11/9/2021 - 12/14/2021.           -Interval History: Patient presents today for follow-up visit.  She has not appreciated any changes on self-exam.  She feels the left breast seroma continues to decrease in size.  She reports some insomnia but otherwise denies persistent headaches, back pain or bone pain, cough or shortness of breath, abdominal pain.  She reports some occasional cramping/pain of the left breast.  She continues on exemestane.    Review of Systems:  Review of Systems   Constitutional:  Negative for activity change, appetite change, chills, fatigue, fever and unexpected weight change.   Respiratory:  Negative for cough and shortness of breath.    Cardiovascular:  Negative for chest pain.   Gastrointestinal:  Negative for abdominal pain, constipation, diarrhea, nausea and vomiting.   Musculoskeletal:  Negative for arthralgias, back pain, gait problem and myalgias.   Skin:  Negative for color change and rash.   Neurological:  Negative for dizziness and headaches.   Hematological:  Negative for adenopathy.   Psychiatric/Behavioral:  Negative for agitation and confusion.    All other systems reviewed and are negative.      Patient Active Problem List   Diagnosis    Closed nondisplaced transverse fracture of right patella    Numbness and tingling of left upper and lower extremity    Essential hypertension    TIA (transient ischemic attack)    Malignant neoplasm of upper-outer quadrant of left breast in female, estrogen receptor positive (HCC)    Hyperlipidemia    Anxiety    Arthritis    Seroma of breast    Use of aromatase inhibitors    Has never received COVID-19 vaccine     Past Medical History:   Diagnosis Date    Acute URI     Anxiety     Arthritis      Atypical chest pain     Breast cancer (HCC)     Fibrocystic breast disease     Hypercholesterolemia     Osteopenia     Palpitations     Psoriasis     Rib pain on left side     Skin cancer 01/2023    squamous cell of right arm lesion removed    Viral conjunctivitis      Past Surgical History:   Procedure Laterality Date    BREAST BIOPSY      BREAST LUMPECTOMY Left 08/26/2021    Procedure: BREAST CHELLY LOCALIZED LUMPECTOMY;  Surgeon: Tracie Nath MD;  [facility];  Service: Surgical Oncology    COLONOSCOPY      FOOT SURGERY Left     cyst removal    INTRAOPERATIVE RADIATION THERAPY (IORT) Left 08/26/2021    Procedure: BREAST INTRAOPERATIVE RADIATION THERAPY (IORT) BY DR SCANLON;  Surgeon: Tracie Nath MD;  [facility];  Service: Surgical Oncology    LYMPH NODE BIOPSY Left 08/26/2021    Procedure: BIOPSY LYMPH NODE SENTINEL (NUC MED INJECTION AT 0900);  Surgeon: Tracie Nath MD;  [facility];  Service: Surgical Oncology    SKIN LESION EXCISION  01/2023    removed from right arm- squamous cell carcinoma    TONSILLECTOMY      US BREAST NEEDLE LOC LEFT Left 08/19/2021    US GUIDANCE BREAST BIOPSY LEFT EACH ADDITIONAL Left 07/12/2021    US GUIDED BREAST BIOPSY LEFT COMPLETE Left 07/12/2021     Family History   Problem Relation Age of Onset    Skin cancer Mother     Skin cancer Father         age dx unk     No Known Problems Daughter     No Known Problems Maternal Grandmother     Lung cancer Maternal Grandfather         age dx unk     No Known Problems Paternal Grandmother     No Known Problems Paternal Grandfather     No Known Problems Maternal Aunt     No Known Problems Maternal Aunt     No Known Problems Maternal Aunt     No Known Problems Paternal Aunt     No Known Problems Cousin     No Known Problems Cousin     No Known Problems Cousin     No Known Problems Cousin     Breast cancer Neg Hx      Social History     Socioeconomic History    Marital status: /Civil Union     Spouse name: Not  on file    Number of children: Not on file    Years of education: Not on file    Highest education level: Not on file   Occupational History    Not on file   Tobacco Use    Smoking status: Never    Smokeless tobacco: Never   Vaping Use    Vaping status: Never Used   Substance and Sexual Activity    Alcohol use: Yes     Comment: Social Holidays    Drug use: Not Currently    Sexual activity: Yes     Partners: Male     Birth control/protection: None   Other Topics Concern    Not on file   Social History Narrative    Not on file     Social Determinants of Health     Financial Resource Strain: Not on file   Food Insecurity: Not on file   Transportation Needs: Not on file   Physical Activity: Not on file   Stress: Not on file   Social Connections: Not on file   Intimate Partner Violence: Not on file   Housing Stability: Not on file       Current Outpatient Medications:     atorvastatin (LIPITOR) 10 mg tablet, , Disp: , Rfl:     Cholecalciferol (VITAMIN D-3 PO), Take 2,000 Int'l Units by mouth daily, Disp: , Rfl:     exemestane (AROMASIN) 25 MG tablet, TAKE 1 TABLET (25 MG TOTAL) BY MOUTH DAILY., Disp: 90 tablet, Rfl: 1    PARoxetine (PAXIL) 20 mg tablet, 20 mg every morning , Disp: , Rfl:   No Known Allergies  Vitals:    04/08/24 0759   BP: 138/84   Pulse: 63   Resp: 16   Temp: 98.3 °F (36.8 °C)   SpO2: 98%       Physical Exam  Vitals reviewed.   Constitutional:       General: She is not in acute distress.     Appearance: Normal appearance. She is well-developed. She is not diaphoretic.   HENT:      Head: Normocephalic and atraumatic.   Cardiovascular:      Rate and Rhythm: Normal rate and regular rhythm.      Heart sounds: Normal heart sounds.   Pulmonary:      Effort: Pulmonary effort is normal.      Breath sounds: Normal breath sounds.   Chest:   Breasts:     Right: No swelling, bleeding, inverted nipple, mass, nipple discharge, skin change or tenderness.      Left: Mass (palpable seroma at lumpectomy site- stable vs  improved) and skin change present. No swelling, bleeding, inverted nipple, nipple discharge or tenderness.      Comments: Small probable suture granuloma at lateral aspect of axillary surgical scar- stable per patient  Abdominal:      Palpations: Abdomen is soft. There is no mass.      Tenderness: There is no abdominal tenderness.   Musculoskeletal:         General: Normal range of motion.      Cervical back: Normal range of motion.   Lymphadenopathy:      Upper Body:      Right upper body: No supraclavicular or axillary adenopathy.      Left upper body: No supraclavicular or axillary adenopathy.   Skin:     General: Skin is warm and dry.      Findings: No rash.   Neurological:      Mental Status: She is alert and oriented to person, place, and time.   Psychiatric:         Speech: Speech normal.           Advance Care Planning/Advance Directives:  Discussed disease status, cancer treatment plans and/or cancer treatment goals with the patient.

## 2024-04-20 DIAGNOSIS — Z17.0 MALIGNANT NEOPLASM OF UPPER-OUTER QUADRANT OF LEFT BREAST IN FEMALE, ESTROGEN RECEPTOR POSITIVE (HCC): ICD-10-CM

## 2024-04-20 DIAGNOSIS — C50.412 MALIGNANT NEOPLASM OF UPPER-OUTER QUADRANT OF LEFT BREAST IN FEMALE, ESTROGEN RECEPTOR POSITIVE (HCC): ICD-10-CM

## 2024-04-21 RX ORDER — EXEMESTANE 25 MG/1
25 TABLET ORAL DAILY
Qty: 90 TABLET | Refills: 1 | Status: SHIPPED | OUTPATIENT
Start: 2024-04-21

## 2024-05-03 ENCOUNTER — ANESTHESIA EVENT (OUTPATIENT)
Dept: ANESTHESIOLOGY | Facility: HOSPITAL | Age: 69
End: 2024-05-03

## 2024-05-03 ENCOUNTER — ANESTHESIA (OUTPATIENT)
Dept: ANESTHESIOLOGY | Facility: HOSPITAL | Age: 69
End: 2024-05-03

## 2024-05-07 ENCOUNTER — OFFICE VISIT (OUTPATIENT)
Dept: HEMATOLOGY ONCOLOGY | Facility: CLINIC | Age: 69
End: 2024-05-07
Payer: MEDICARE

## 2024-05-07 VITALS
TEMPERATURE: 97.2 F | OXYGEN SATURATION: 97 % | DIASTOLIC BLOOD PRESSURE: 82 MMHG | RESPIRATION RATE: 17 BRPM | BODY MASS INDEX: 26.38 KG/M2 | SYSTOLIC BLOOD PRESSURE: 124 MMHG | HEIGHT: 64 IN | HEART RATE: 79 BPM | WEIGHT: 154.5 LBS

## 2024-05-07 DIAGNOSIS — Z79.811 USE OF AROMATASE INHIBITORS: ICD-10-CM

## 2024-05-07 DIAGNOSIS — C50.412 MALIGNANT NEOPLASM OF UPPER-OUTER QUADRANT OF LEFT BREAST IN FEMALE, ESTROGEN RECEPTOR POSITIVE (HCC): Primary | ICD-10-CM

## 2024-05-07 DIAGNOSIS — Z17.0 MALIGNANT NEOPLASM OF UPPER-OUTER QUADRANT OF LEFT BREAST IN FEMALE, ESTROGEN RECEPTOR POSITIVE (HCC): Primary | ICD-10-CM

## 2024-05-07 PROCEDURE — 99215 OFFICE O/P EST HI 40 MIN: CPT | Performed by: INTERNAL MEDICINE

## 2024-05-07 NOTE — PROGRESS NOTES
Hematology/Oncology Outpatient Office Note  Date of Service: 5/7/2024    [facility] [address] [place] [id number]  [phone]    Reason for Consultation:   Chief Complaint   Patient presents with    Follow-up     Referral Physician: No ref. provider found  Primary Care Physician:  Endy Banegas MD      Diagnosis ICD-10-CM Associated Orders   1. Malignant neoplasm of upper-outer quadrant of left breast in female, estrogen receptor positive (HCC)  C50.412     Z17.0       2. Use of aromatase inhibitors  Z79.811           Assessment/plan:     1.  Left breast cancer, stage IB ( pT1b, pN1a (mi), M0) grade 1, ER 90% positive, VT negative, HER2 fish negative disease.  Oncotype DX recurrence score 14.   Diagnosed in August 2021.  2.  Current use of aromatase inhibitor    An Mcwilliams is a 68 y.o. postmenopausal female with stage IB left breast cancer, grade 1, ER 90% positive, VT negative, HER2 fish negative disease.  She underwent lumpectomy and sentinel lymph node biopsy, resulting in LEO.  She had 1 positive lymph node with micrometastasis.  Her Oncotype DX recurrence score 14.  She completed adjuvant radiation.  She was previously taking anastrozole, but due to musculoskeletal symptoms subsequently switched to exemestane which she continues to take.  She continues to do well from oncology perspective.  No clinical evidence of disease recurrence.  She will continue exemestane 25 mg daily.  She continues to follow with radiation oncology and breast surgery.  Close follow-up with her PCP for ongoing bone health surveillance.  Office follow-up in 6 months.    I discussed with the patient the clinical course leading up to their cancer diagnosis. I reviewed relevant office notes, imaging reports and pathology result as well. I personally reviewed the lab results, the imaging studies, pathology, other specialty/physicians consult notes and  recommendations, and outside medical records. I had a lengthy discussion with the patient and shared the work-up findings. We discussed the diagnosis and management plan. I spent 41 minutes reviewing the records (labs, clinician notes, outside records, medical history, ordering medicine/tests/procedures, monitoring of anti-neoplastic toxicities, interpreting the imaging/labs previously done) and coordination of care as well as direct time with the patient today, of which greater than 50% of the time was spent in counseling and coordination of care with the patient/family.    Return in about 6 months (around 11/7/2024) for Office Visit.    Erin Noland, DO 5/7/2024   Hematology & Medical Oncology Physician    Oncology history:     Oncology History   Malignant neoplasm of upper-outer quadrant of left breast in female, estrogen receptor positive (HCC)   7/12/2021 Initial Diagnosis    Malignant neoplasm of upper-outer quadrant of left breast in female, estrogen receptor positive (HCC)     7/12/2021 Biopsy    A. Breast, left at 02:00 o'clock 7 cm from nipple, ultrasound-guided biopsy (3 passes):  -  Invasive breast carcinoma of no special type (ductal NST/invasive ductal carcinoma).  - grade 1  - ER 95%, WA 0%, HER2 2+, equivocal by IHC but negative on the fish analysis    B. Breast, left at 02:00 o'clock 3 cm from nipple, ultrasound-guided biopsy (2 passes):  -  Benign breast parenchyma with fibrocystic changes including apocrine metaplasia, usual ductal hyperplasia, bold all and microcyst formation with a prominent sclerosed and cystic nodule, cannot exclude old granuloma versus sclerosed cyst.  -  Negative for dysplasia or carcinoma.      8/5/2021 -  Cancer Staged    Staging form: Breast, AJCC 8th Edition  - Clinical: Stage IA (cT1a, cN0, cM0, G1, ER+, WA-, HER2-) - Signed by Tracie Nath MD on 8/5/2021  Stage prefix: Initial diagnosis  Method of lymph node assessment: Clinical  Histologic grading system: 3 grade  system       8/26/2021 Surgery    Left breast lumpectomy with SLNB  Surgeon: Dr Tracie Nath    A. Left breast, lumpectomy:  - Invasive breast carcinoma of no special type (ductal NST/invasive ductal carcinoma).  - Ductal carcinoma in situ (DCIS).  - All margins are negative for carcinoma or DCIS.     B. Left breast sentinel lymph node, excision:  - One of three lymph nodes with micrometastases (pN1mi).     Note: Small focus (1 mm) metastatic carcinoma present in permanent section only.     C. Left breast, new superior margin, re-excision:  - Benign fibroadipose tissue.  - Negative for malignancy.     D. Left breast, new medial margin, re-excision:  - Benign breast parenchyma and fibroadipose tissue.  - Negative for malignancy.     8/26/2021 -  Radiation    Treatments not in [place])  Field Numbers Energy Treatment Site Starting  Ending  Elapsed  Fraction Total  Overlap Site Overlap         Date Date Days Dose Dose   Dose   IORT  50KVp  Left breast  8/26/21 8/26/21 2000 2000 9/9/2021 -  Cancer Staged    Staging form: Breast, AJCC 8th Edition  - Pathologic: Stage IA (pT1b, pN1mi(sn), cM0, G1, ER+, IA-, HER2-) - Signed by Tracie Nath MD on 9/9/2021  Stage prefix: Initial diagnosis  Method of lymph node assessment: [place] lymph node biopsy  Histologic grading system: 3 grade system       11/9/2021 - 12/14/2021 Radiation    BH L BREAST 6X 25 / 25 200 0 5,000 35      Treatment Dates:  11/9/2021 - 12/14/2021.        Primary Diagnosis:  Left breast cancer, stage IB (pT1b, pN1a(mi), M0) grade 1, ER 90% positive, IA negative, HER2 fish negative disease.  Oncotype DX recurrence score 14.   Diagnosed in August 2021.     Previous Hematologic/ Oncologic Treatment:   Adjuvant hormonal therapy with anastrozole from October 2021 through October 2022.     Current Hematologic/ Oncologic Treatment:    Adjuvant hormonal therapy with exemestane since October 2022.    History of present illness:   An Mcwilliams is a 68  year-old postmenopausal woman who was found to have radiographic abnormality in her right breast based on a screening mammography.  Therefore, she underwent right breast biopsy in July 12, 2021, which showed invasive ductal carcinoma, grade 1. This was ER 90% positive, CA negative, HER2 2+ disease.  HER2 fish was negative for gene application with ratio of 1.0.  She subsequent underwent lumpectomy and sentinel lymph node biopsy by Dr. Nath in August 26, 2021. She had 9 x 8 x 5 mm of invasive ductal carcinoma, grade 1. Lymphovascular invasion was present.  1/3 sentinel lymph nodes or had micrometastasis measuring 1 mm.  There was no evidence of extranodal extension.  She had intraoperative radiation therapy at the time of lumpectomy.  Her Oncotype DX recurrence score was 14.  Therefore, adjuvant chemotherapy was not indicated. since October 2021, she was on adjuvant hormonal therapy with anastrozole until October 2022, when she developed significant musculoskeletal symptoms.  Therefore, she switched her hormonal therapy to exemestane.  She was previously following Dr. Gurrola last seen in the office on 5/3/2023.  She presents today for follow-up/transfer of care visit.  She continues to take exemestane without any adverse effects.  She denies any chest pain, palpitations or respiratory symptoms.  Denies any bone pain.  Weight remains stable.  She does note intermittent insomnia.    Review of systems:   Review of Systems   Constitutional: Negative. Negative for fever and malaise/fatigue.   HENT: Negative.     Cardiovascular: Negative.  Negative for chest pain, dyspnea on exertion and palpitations.   Respiratory: Negative.  Negative for shortness of breath.    Hematologic/Lymphatic: Negative.  Does not bruise/bleed easily.   Skin: Negative.    Musculoskeletal: Negative.    Gastrointestinal: Negative.  Negative for abdominal pain.   Neurological: Negative.    Psychiatric/Behavioral:  The patient has insomnia.          A 10-point review of system was performed, pertinent positive and negative were detailed as above. Otherwise, the 10-point review of system was negative.    Past Medical History:   Diagnosis Date    Acute URI     Anxiety     Arthritis     Atypical chest pain     Breast cancer (HCC)     Fibrocystic breast disease     Hypercholesterolemia     Osteopenia     Palpitations     Psoriasis     Rib pain on left side     Skin cancer 01/2023    squamous cell of right arm lesion removed    Viral conjunctivitis        Past Surgical History:   Procedure Laterality Date    BREAST BIOPSY      BREAST LUMPECTOMY Left 08/26/2021    Procedure: BREAST CHELLY LOCALIZED LUMPECTOMY;  Surgeon: Tracie Nath MD;  [facility];  Service: Surgical Oncology    COLONOSCOPY      FOOT SURGERY Left     cyst removal    INTRAOPERATIVE RADIATION THERAPY (IORT) Left 08/26/2021    Procedure: BREAST INTRAOPERATIVE RADIATION THERAPY (IORT) BY DR SCANLON;  Surgeon: Tracie Nath MD;  [facility];  Service: Surgical Oncology    LYMPH NODE BIOPSY Left 08/26/2021    Procedure: BIOPSY LYMPH NODE SENTINEL (NUC MED INJECTION AT 0900);  Surgeon: Tracie Nath MD;  [facility];  Service: Surgical Oncology    SKIN LESION EXCISION  01/2023    removed from right arm- squamous cell carcinoma    TONSILLECTOMY      US BREAST NEEDLE LOC LEFT Left 08/19/2021    US GUIDANCE BREAST BIOPSY LEFT EACH ADDITIONAL Left 07/12/2021    US GUIDED BREAST BIOPSY LEFT COMPLETE Left 07/12/2021       Family History   Problem Relation Age of Onset    Skin cancer Mother     Skin cancer Father         age dx unk     No Known Problems Daughter     No Known Problems Maternal Grandmother     Lung cancer Maternal Grandfather         age dx unk     No Known Problems Paternal Grandmother     No Known Problems Paternal Grandfather     No Known Problems Maternal Aunt     No Known Problems Maternal Aunt     No Known Problems Maternal Aunt     No Known Problems Paternal Aunt      No Known Problems Cousin     No Known Problems Cousin     No Known Problems Cousin     No Known Problems Cousin     Breast cancer Neg Hx        Social History     Socioeconomic History    Marital status: /Civil Union     Spouse name: Not on file    Number of children: Not on file    Years of education: Not on file    Highest education level: Not on file   Occupational History    Not on file   Tobacco Use    Smoking status: Never    Smokeless tobacco: Never   Vaping Use    Vaping status: Never Used   Substance and Sexual Activity    Alcohol use: Yes     Comment: Social Holidays    Drug use: Not Currently    Sexual activity: Yes     Partners: Male     Birth control/protection: None   Other Topics Concern    Not on file   Social History Narrative    Not on file     Social Determinants of Health     Financial Resource Strain: Not on file   Food Insecurity: Not on file   Transportation Needs: Not on file   Physical Activity: Not on file   Stress: Not on file   Social Connections: Not on file   Intimate Partner Violence: Not on file   Housing Stability: Not on file       No Known Allergies    Current Outpatient Medications   Medication Sig Dispense Refill    atorvastatin (LIPITOR) 10 mg tablet       Cholecalciferol (VITAMIN D-3 PO) Take 2,000 Int'l Units by mouth daily      exemestane (AROMASIN) 25 MG tablet TAKE 1 TABLET (25 MG TOTAL) BY MOUTH DAILY. 90 tablet 1    PARoxetine (PAXIL) 20 mg tablet 20 mg every morning        No current facility-administered medications for this visit.     I have reviewed the relevant past medical, surgical, social and family history. I have also reviewed allergies and medications for this patient.    Objective:      Vitals:    05/07/24 1043   BP: 124/82   Pulse: 79   Resp: 17   Temp: (!) 97.2 °F (36.2 °C)   SpO2: 97%       Wt Readings from Last 3 Encounters:   05/07/24 70.1 kg (154 lb 8 oz)   04/08/24 69.7 kg (153 lb 9.6 oz)   03/06/24 69.6 kg (153 lb 6.4 oz)       Physical  Exam  Vitals and nursing note reviewed.   Constitutional:       General: She is not in acute distress.     Appearance: She is well-developed.   HENT:      Head: Normocephalic and atraumatic.   Eyes:      Conjunctiva/sclera: Conjunctivae normal.   Cardiovascular:      Rate and Rhythm: Normal rate and regular rhythm.      Heart sounds: No murmur heard.  Pulmonary:      Effort: Pulmonary effort is normal. No respiratory distress.      Breath sounds: Normal breath sounds.   Chest:          Comments: Left breast: Lumpectomy scar at 3:00.  No palpable abnormality.  No palpable supra/infraclavicular/axillary LAD.    Right breast: Negative exam    Abdominal:      Palpations: Abdomen is soft.      Tenderness: There is no abdominal tenderness.   Musculoskeletal:         General: No swelling.      Cervical back: Neck supple.   Skin:     General: Skin is warm and dry.      Capillary Refill: Capillary refill takes less than 2 seconds.   Neurological:      Mental Status: She is alert.   Psychiatric:         Mood and Affect: Mood normal.         Data Review:  Pathology Result:  Final Diagnosis   Date Value Ref Range Status   08/26/2021   Final    A. Left breast, lumpectomy:  - Invasive breast carcinoma of no special type (ductal NST/invasive ductal carcinoma).  - Ductal carcinoma in situ (DCIS).  - All margins are negative for carcinoma or DCIS.    B. Left breast sentinel lymph node, excision:  - One of three lymph nodes with micrometastases (pN1mi).    Note: Small focus (1 mm) metastatic carcinoma present in permanent section only.    C. Left breast, new superior margin, re-excision:  - Benign fibroadipose tissue.  - Negative for malignancy.    D. Left breast, new medial margin, re-excision:  - Benign breast parenchyma and fibroadipose tissue.  - Negative for malignancy.    Comment: Immunohistochemistry for E-cadherin, p120 show membranous pattern; p63 and calponin are negative.  Intradepartmental consultation is in  agreement.  If clinically indicated, the most appropriate tissue block(s) for ancillary testing are block(s):  A6  DrNash Nath is notified of the diagnosis in EPIC via [employer] on 9/1/2021  at 4:50 pm .         Comment:     This is an appended report. These results have been appended to a previously preliminary verified report.   07/12/2021   Final    A. Breast, left at 02:00 o'clock 7 cm from nipple, ultrasound-guided biopsy (3 passes):  -  Invasive breast carcinoma of no special type (ductal NST/invasive ductal carcinoma).   * Emelia grade 1 of 3 (total score: 4 of 9)    -- tubule formation >75%, score 1    -- nuclear grade 2 of 3, score 2    -- mitoses < 3/mm2, (</= 7 mitoses/10HPF), score 1.   * Confirmed by tumor cell immunophenotype:    -- negative:   calponin-B, S100.    * Invasive carcinoma involves 3 of 3 submitted core biopsies, max. dimension = 6 millimeters.   * Estrogen, progesterone & HER2 receptor studies pending, to be described in a separate receptor report.    * Ductal carcinoma in situ (DCIS): Not identified.    * Lymph-vascular invasion: Not identified.    * Microcalcifications: Present.     B. Breast, left at 02:00 o'clock 3 cm from nipple, ultrasound-guided biopsy (2 passes):  -  Benign breast parenchyma with fibrocystic changes including apocrine metaplasia, usual ductal hyperplasia, bold all and microcyst formation with a prominent sclerosed and cystic nodule, cannot exclude old granuloma versus sclerosed cyst.  -  Negative for dysplasia or carcinoma.   -  Immunohistochemical stain performed with appropriate controls for CK5/6 shows mosaic epithelial staining in the hyperplasia, supporting the diagnosis.      Note:    1. Intradepartmental consultation concurs   2. Final report is telephonically messaged to Ina Vu on 7/16/21 at 1212.          Image Results:  Image result are reviewed and documented in Hematology/Oncology history    Mammo diagnostic bilateral w 3d &  cad  Narrative: DIAGNOSIS: Abnormal mammogram     TECHNIQUE:  Digital diagnostic mammography was performed. Computer Aided Detection   (CAD) analyzed all applicable images.    COMPARISONS: Prior breast imaging dated: 06/20/2022, 08/26/2021,   08/19/2021, 08/19/2021, 07/12/2021, 07/12/2021, 07/12/2021, 07/06/2021,   07/06/2021, 06/18/2021, 06/11/2020, 06/10/2019, 05/29/2018, 05/15/2017,   05/11/2016, 05/04/2015, and 04/28/2014    RELEVANT HISTORY:   Family Breast Cancer History: History of breast cancer in Neg Hx.  Family Medical History: No known relevant family medical history.   Personal History: Hormone history includes birth control. Surgical history   includes breast biopsy and lumpectomy. Medical history includes   fibrocystic breast.    RISK ASSESSMENT:   5 Year Tyrer-Cuzick: 0.7 %  10 Year Tyrer-Cuzick: 1.42 %  Lifetime Tyrer-Cuzick: 2.6 %    TISSUE DENSITY:   The breasts are almost entirely fatty.     INDICATION: An Mcwilliams is a 67 y.o. female presenting for abnormal   mammogram .    FINDINGS:   Bilateral  There are no suspicious masses, grouped microcalcifications or areas of   unexplained architectural distortion. The skin and nipple areolar complex   are unremarkable.    Stable postop changes on the left with large postop seroma in the left   axillary tail region.  Impression:    Stable exam.    ASSESSMENT/BI-RADS CATEGORY:     Overall: 2 - Benign    RECOMMENDATION:       - Diagnostic mammogram in 1 year for both breasts.    Workstation ID: [id number]      Labs:  Lab data are reviewed and documented in [facility] history.     Lab Results   Component Value Date    HGB 12.7 11/14/2021    HCT 39.0 11/14/2021    MCV 96 11/14/2021     11/14/2021    WBC 6.35 11/14/2021    NRBC 0 11/14/2021     Lab Results   Component Value Date    K 4.0 10/18/2023     10/18/2023    CO2 24 10/18/2023    BUN 12 10/18/2023    CREATININE 0.87 10/18/2023    GLUF 90 08/14/2021    CALCIUM 9.3 10/18/2023    AST 26  08/14/2021    ALT 44 08/14/2021    ALKPHOS 77 08/14/2021    EGFR 72 10/18/2023     Disclaimer: This document was prepared using [employer] technology. If a word or phrase is confusing, or does not make sense, this is likely due to recognition error which was not discovered during this clinician's review. If you believe an error has occurred, please contact me through [employer] service line for felicia?cation.

## 2024-05-17 ENCOUNTER — ANESTHESIA EVENT (OUTPATIENT)
Dept: GASTROENTEROLOGY | Facility: AMBULARY SURGERY CENTER | Age: 69
End: 2024-05-17

## 2024-05-17 ENCOUNTER — HOSPITAL ENCOUNTER (OUTPATIENT)
Dept: GASTROENTEROLOGY | Facility: AMBULARY SURGERY CENTER | Age: 69
Setting detail: OUTPATIENT SURGERY
End: 2024-05-17
Payer: MEDICARE

## 2024-05-17 ENCOUNTER — ANESTHESIA (OUTPATIENT)
Dept: GASTROENTEROLOGY | Facility: AMBULARY SURGERY CENTER | Age: 69
End: 2024-05-17

## 2024-05-17 VITALS
OXYGEN SATURATION: 99 % | HEART RATE: 91 BPM | WEIGHT: 149 LBS | BODY MASS INDEX: 25.44 KG/M2 | DIASTOLIC BLOOD PRESSURE: 70 MMHG | RESPIRATION RATE: 22 BRPM | HEIGHT: 64 IN | TEMPERATURE: 97.6 F | SYSTOLIC BLOOD PRESSURE: 145 MMHG

## 2024-05-17 DIAGNOSIS — Z12.11 COLON CANCER SCREENING: ICD-10-CM

## 2024-05-17 PROCEDURE — 45385 COLONOSCOPY W/LESION REMOVAL: CPT | Performed by: INTERNAL MEDICINE

## 2024-05-17 PROCEDURE — 88305 TISSUE EXAM BY PATHOLOGIST: CPT | Performed by: STUDENT IN AN ORGANIZED HEALTH CARE EDUCATION/TRAINING PROGRAM

## 2024-05-17 RX ORDER — PROPOFOL 10 MG/ML
INJECTION, EMULSION INTRAVENOUS AS NEEDED
Status: DISCONTINUED | OUTPATIENT
Start: 2024-05-17 | End: 2024-05-17

## 2024-05-17 RX ORDER — SODIUM CHLORIDE, SODIUM LACTATE, POTASSIUM CHLORIDE, CALCIUM CHLORIDE 600; 310; 30; 20 MG/100ML; MG/100ML; MG/100ML; MG/100ML
INJECTION, SOLUTION INTRAVENOUS CONTINUOUS PRN
Status: DISCONTINUED | OUTPATIENT
Start: 2024-05-17 | End: 2024-05-17

## 2024-05-17 RX ORDER — LIDOCAINE HYDROCHLORIDE 20 MG/ML
INJECTION, SOLUTION EPIDURAL; INFILTRATION; INTRACAUDAL; PERINEURAL AS NEEDED
Status: DISCONTINUED | OUTPATIENT
Start: 2024-05-17 | End: 2024-05-17

## 2024-05-17 RX ADMIN — PROPOFOL 30 MG: 10 INJECTION, EMULSION INTRAVENOUS at 09:16

## 2024-05-17 RX ADMIN — LIDOCAINE HYDROCHLORIDE 50 MG: 20 INJECTION, SOLUTION EPIDURAL; INFILTRATION; INTRACAUDAL at 09:06

## 2024-05-17 RX ADMIN — PROPOFOL 80 MG: 10 INJECTION, EMULSION INTRAVENOUS at 09:06

## 2024-05-17 RX ADMIN — PROPOFOL 30 MG: 10 INJECTION, EMULSION INTRAVENOUS at 09:21

## 2024-05-17 RX ADMIN — SODIUM CHLORIDE, SODIUM LACTATE, POTASSIUM CHLORIDE, AND CALCIUM CHLORIDE: .6; .31; .03; .02 INJECTION, SOLUTION INTRAVENOUS at 09:01

## 2024-05-17 RX ADMIN — PROPOFOL 30 MG: 10 INJECTION, EMULSION INTRAVENOUS at 09:12

## 2024-05-17 RX ADMIN — PROPOFOL 20 MG: 10 INJECTION, EMULSION INTRAVENOUS at 09:07

## 2024-05-17 NOTE — ANESTHESIA PREPROCEDURE EVALUATION
Procedure:  COLONOSCOPY    Relevant Problems   CARDIO   (+) Essential hypertension   (+) Hyperlipidemia      GYN   (+) Malignant neoplasm of upper-outer quadrant of left breast in female, estrogen receptor positive (HCC)      MUSCULOSKELETAL   (+) Arthritis      NEURO/PSYCH   (+) Anxiety   (+) Numbness and tingling of left upper and lower extremity   (+) TIA (transient ischemic attack)        Physical Exam    Airway    Mallampati score: II  TM Distance: >3 FB  Neck ROM: full     Dental   No notable dental hx     Cardiovascular  Rhythm: regular, Rate: normal    Pulmonary   Breath sounds clear to auscultation    Other Findings  post-pubertal.      Anesthesia Plan  ASA Score- 2     Anesthesia Type- IV sedation with anesthesia with ASA Monitors.         Additional Monitors:     Airway Plan:            Plan Factors-Exercise tolerance (METS): >4 METS.    Chart reviewed.   Existing labs reviewed. Patient summary reviewed.    Patient is not a current smoker.      Obstructive sleep apnea risk education given perioperatively.        Induction- intravenous.    Postoperative Plan-         Informed Consent- Anesthetic plan and risks discussed with patient.  I personally reviewed this patient with the CRNA. Discussed and agreed on the Anesthesia Plan with the CRNA..

## 2024-05-17 NOTE — H&P
History and Physical - [facility]  An Mcwilliams 68 y.o. female MRN: [id number]    HPI: An Mcwilliams is a 68 y.o. year old female who presents for colon cancer screening evaluation.      Review of Systems    Historical Information   Past Medical History:   Diagnosis Date    Acute URI     Anxiety     Arthritis     Atypical chest pain     Breast cancer (HCC)     Cancer (HCC)     breast    Fibrocystic breast disease     Hypercholesterolemia     Osteopenia     Palpitations     Psoriasis     Rib pain on left side     Skin cancer 01/2023    squamous cell of right arm lesion removed    Viral conjunctivitis      Past Surgical History:   Procedure Laterality Date    BREAST BIOPSY      BREAST LUMPECTOMY Left 08/26/2021    Procedure: BREAST CHELLY LOCALIZED LUMPECTOMY;  Surgeon: Tracie Nath MD;  [facility];  Service: Surgical Oncology    COLONOSCOPY      FOOT SURGERY Left     cyst removal    HYSTERECTOMY      INTRAOPERATIVE RADIATION THERAPY (IORT) Left 08/26/2021    Procedure: BREAST INTRAOPERATIVE RADIATION THERAPY (IORT) BY DR CSANLON;  Surgeon: Tracie Nath MD;  [facility];  Service: Surgical Oncology    LYMPH NODE BIOPSY Left 08/26/2021    Procedure: BIOPSY LYMPH NODE SENTINEL (NUC MED INJECTION AT 0900);  Surgeon: Tracie Nath MD;  [facility];  Service: Surgical Oncology    SKIN LESION EXCISION  01/2023    removed from right arm- squamous cell carcinoma    TONSILLECTOMY      US BREAST NEEDLE LOC LEFT Left 08/19/2021    US GUIDANCE BREAST BIOPSY LEFT EACH ADDITIONAL Left 07/12/2021    US GUIDED BREAST BIOPSY LEFT COMPLETE Left 07/12/2021     Social History   Social History     Substance and Sexual Activity   Alcohol Use Yes    Comment: Social Holidays     Social History     Substance and Sexual Activity   Drug Use Not Currently     Social History     Tobacco Use   Smoking Status Never   Smokeless Tobacco Never     Family History   Problem Relation Age of Onset     "Skin cancer Mother     Skin cancer Father         age dx unk     No Known Problems Daughter     No Known Problems Maternal Grandmother     Lung cancer Maternal Grandfather         age dx unk     No Known Problems Paternal Grandmother     No Known Problems Paternal Grandfather     No Known Problems Maternal Aunt     No Known Problems Maternal Aunt     No Known Problems Maternal Aunt     No Known Problems Paternal Aunt     No Known Problems Cousin     No Known Problems Cousin     No Known Problems Cousin     No Known Problems Cousin     Breast cancer Neg Hx        Meds/Allergies     Not in a hospital admission.    No Known Allergies    Objective     /78   Pulse 85   Temp 97.5 °F (36.4 °C) (Temporal)   Resp 17   Ht 5' 4\" (1.626 m)   Wt 67.6 kg (149 lb)   SpO2 99%   BMI 25.58 kg/m²       PHYSICAL EXAM    Gen: NAD  CV: RRR  CHEST: Clear  ABD: soft, NT/ND  EXT: no edema  Neuro: AAO      ASSESSMENT/PLAN:  This is a 68 y.o. year old female here for colon cancer screening evaluation.    PLAN:   Procedure: Colonoscopy.      "

## 2024-05-17 NOTE — ANESTHESIA POSTPROCEDURE EVALUATION
Post-Op Assessment Note    CV Status:  Stable    Pain management: adequate       Mental Status:  Alert and awake   Hydration Status:  Euvolemic   PONV Controlled:  Controlled   Airway Patency:  Patent     Post Op Vitals Reviewed: Yes    No anethesia notable event occurred.    Staff: CRNA               BP   100/56   Temp   97.5   Pulse  83   Resp   14   SpO2   99

## 2024-05-20 PROCEDURE — 88305 TISSUE EXAM BY PATHOLOGIST: CPT | Performed by: STUDENT IN AN ORGANIZED HEALTH CARE EDUCATION/TRAINING PROGRAM

## 2024-06-25 ENCOUNTER — HOSPITAL ENCOUNTER (OUTPATIENT)
Dept: MAMMOGRAPHY | Facility: CLINIC | Age: 69
Discharge: HOME | End: 2024-06-25
Payer: MEDICARE

## 2024-06-25 DIAGNOSIS — R92.8 MAMMOGRAM ABNORMAL: ICD-10-CM

## 2024-06-25 PROCEDURE — 77066 DX MAMMO INCL CAD BI: CPT

## 2024-06-25 PROCEDURE — G0279 TOMOSYNTHESIS, MAMMO: HCPCS

## 2024-10-14 ENCOUNTER — OFFICE VISIT (OUTPATIENT)
Dept: SURGICAL ONCOLOGY | Facility: CLINIC | Age: 69
End: 2024-10-14
Payer: MEDICARE

## 2024-10-14 VITALS
HEIGHT: 64 IN | OXYGEN SATURATION: 98 % | TEMPERATURE: 97.5 F | BODY MASS INDEX: 25.58 KG/M2 | SYSTOLIC BLOOD PRESSURE: 112 MMHG | HEART RATE: 81 BPM | DIASTOLIC BLOOD PRESSURE: 80 MMHG

## 2024-10-14 DIAGNOSIS — Z79.811 USE OF AROMATASE INHIBITORS: ICD-10-CM

## 2024-10-14 DIAGNOSIS — C50.412 MALIGNANT NEOPLASM OF UPPER-OUTER QUADRANT OF LEFT BREAST IN FEMALE, ESTROGEN RECEPTOR POSITIVE (HCC): Primary | ICD-10-CM

## 2024-10-14 DIAGNOSIS — Z17.0 MALIGNANT NEOPLASM OF UPPER-OUTER QUADRANT OF LEFT BREAST IN FEMALE, ESTROGEN RECEPTOR POSITIVE (HCC): Primary | ICD-10-CM

## 2024-10-14 PROCEDURE — 99213 OFFICE O/P EST LOW 20 MIN: CPT | Performed by: NURSE PRACTITIONER

## 2024-10-14 NOTE — PROGRESS NOTES
Surgical Oncology Follow Up       [address]  [facility] [place]  [address]  [facility] [id number]    An Mcwilliams  1955  [id number]  [address]  [facility] [place]  [address] [place] [address]    Chief Complaint   Patient presents with    Follow-up       Assessment/Plan:  1. Malignant neoplasm of upper-outer quadrant of left breast in female, estrogen receptor positive (HCC)  - 6 mo f/u visit  - Mammo diagnostic bilateral w 3d and cad; Future    2. Use of aromatase inhibitors  - per medical oncology         Discussion/Summary: Patient is a 69-year-old female that was diagnosed with a left-sided breast cancer in July 2021.  Her pathology revealed invasive ductal carcinoma, ER positive, ME negative, HER2 negative.  She underwent a left lumpectomy and sentinel node biopsy with Dr. Nath and completed adjuvant radiation therapy.  She is currently maintained on exemestane.  She had a bilateral 3D diagnostic mammogram in June 2024 which was BI-RADS 2, category 2 density. No worrisome findings on today's clinical exam. She complains today of some worsening generalized arthralgias.  She is going to discuss this with her medical oncologist with regards to aromatase inhibitor use.  Otherwise she offers no new complaints today and there are no worrisome findings on today's clinical exam.  She is already scheduled to see Dr. Nath back in 6 months.  She was instructed to contact us with any changes or concerns in the interim.  All of her questions were answered today.      History of Present Illness:     Oncology History Overview Note   With h/o Left breast ER+, ME-,HER2- cancer. She had a lumpectomy with IORT 8/26/2021. She had 1 lymph node with micro metastases. She completed a course radiation to Left breast on 12/14/2021. She was last seen by  on 8/15/2023. She returns today for a follow up.     4/8/24  Selkregg  She is currently taking exemestane.She is doing well. Mammogram in June. Follow up 6 months.    5/7/24   She was taking anastrozole but do to musculoskeletal issues is in exemestane. No presence of reoccurrence. Patient sees radiation oncology, surgical oncology for follow up care. Will see back in 6 months.     6/25/24 Mammo diagnostic bilateral w 3d&cad  IMPRESSION:   No evidence of malignancy.  ASSESSMENT/BI-RADS CATEGORY:  Left: 2 - Benign  Right: 2 - Benign  Overall: 2 - Benign  RECOMMENDATION:       - Routine screening mammogram in 1 year for both breasts.    Upcoming  10/14/24 Vidya  11/07/24   6/2/25         Malignant neoplasm of upper-outer quadrant of left breast in female, estrogen receptor positive (HCC)   7/12/2021 Initial Diagnosis    Malignant neoplasm of upper-outer quadrant of left breast in female, estrogen receptor positive (HCC)     7/12/2021 Biopsy    A. Breast, left at 02:00 o'clock 7 cm from nipple, ultrasound-guided biopsy (3 passes):  -  Invasive breast carcinoma of no special type (ductal NST/invasive ductal carcinoma).  - grade 1  - ER 95%, NH 0%, HER2 2+, equivocal by IHC but negative on the fish analysis    B. Breast, left at 02:00 o'clock 3 cm from nipple, ultrasound-guided biopsy (2 passes):  -  Benign breast parenchyma with fibrocystic changes including apocrine metaplasia, usual ductal hyperplasia, bold all and microcyst formation with a prominent sclerosed and cystic nodule, cannot exclude old granuloma versus sclerosed cyst.  -  Negative for dysplasia or carcinoma.      8/5/2021 -  Cancer Staged    Staging form: Breast, AJCC 8th Edition  - Clinical: Stage IA (cT1a, cN0, cM0, G1, ER+, NH-, HER2-) - Signed by Tracie Nath MD on 8/5/2021  Stage prefix: Initial diagnosis  Method of lymph node assessment: Clinical  Histologic grading system: 3 grade system       8/26/2021 Surgery    Left breast lumpectomy with SLNB  Surgeon: Dr Tracie BUSTILLOS  Left breast, lumpectomy:  - Invasive breast carcinoma of no special type (ductal NST/invasive ductal carcinoma).  - Ductal carcinoma in situ (DCIS).  - All margins are negative for carcinoma or DCIS.     B. Left breast sentinel lymph node, excision:  - One of three lymph nodes with micrometastases (pN1mi).     Note: Small focus (1 mm) metastatic carcinoma present in permanent section only.     C. Left breast, new superior margin, re-excision:  - Benign fibroadipose tissue.  - Negative for malignancy.     D. Left breast, new medial margin, re-excision:  - Benign breast parenchyma and fibroadipose tissue.  - Negative for malignancy.     8/26/2021 -  Radiation    Treatments not in [facility])  Field Numbers Energy Treatment Site Starting  Ending  Elapsed  Fraction Total  Overlap Site Overlap         Date Date Days Dose Dose   Dose   IORT  50KVp  Left breast  8/26/21 8/26/21 2000 2000 9/9/2021 -  Cancer Staged    Staging form: Breast, AJCC 8th Edition  - Pathologic: Stage IA (pT1b, pN1mi(sn), cM0, G1, ER+, RI-, HER2-) - Signed by Tracie Nath MD on 9/9/2021  Stage prefix: Initial diagnosis  Method of lymph node assessment: [place] lymph node biopsy  Histologic grading system: 3 grade system       11/9/2021 - 12/14/2021 Radiation    BH L BREAST 6X 25 / 25 200 0 5,000 35      Treatment Dates:  11/9/2021 - 12/14/2021.           -Interval History: Patient presents today for follow-up visit.  She has not appreciated any changes on self breast exam.  She had a bilateral mammogram in June 2024 which was BI-RADS 2, category 2 density.  She reports some generalized arthralgias which improved with movement.  Otherwise she denies new or persistent headaches, back pain or bone pain, cough or shortness of breath, abdominal pain.    Review of Systems:  Review of Systems   Constitutional:  Negative for activity change, appetite change, chills, fatigue, fever and unexpected weight change.   Respiratory:  Negative for cough  and shortness of breath.    Cardiovascular:  Negative for chest pain.   Gastrointestinal:  Negative for abdominal pain, constipation, diarrhea, nausea and vomiting.   Musculoskeletal:  Positive for arthralgias. Negative for back pain, gait problem and myalgias.   Skin:  Negative for color change and rash.   Neurological:  Negative for dizziness and headaches.   Hematological:  Negative for adenopathy.   Psychiatric/Behavioral:  Negative for agitation and confusion.    All other systems reviewed and are negative.      Patient Active Problem List   Diagnosis    Closed nondisplaced transverse fracture of right patella    Numbness and tingling of left upper and lower extremity    Essential hypertension    TIA (transient ischemic attack)    Malignant neoplasm of upper-outer quadrant of left breast in female, estrogen receptor positive (HCC)    Hyperlipidemia    Anxiety    Arthritis    Seroma of breast    Use of aromatase inhibitors    Has never received COVID-19 vaccine     Past Medical History:   Diagnosis Date    Acute URI     Anxiety     Arthritis     Atypical chest pain     Breast cancer (HCC)     Cancer (HCC)     breast    Fibrocystic breast disease     Hypercholesterolemia     Osteopenia     Palpitations     Psoriasis     Rib pain on left side     Skin cancer 01/2023    squamous cell of right arm lesion removed    Viral conjunctivitis      Past Surgical History:   Procedure Laterality Date    BREAST BIOPSY      BREAST LUMPECTOMY Left 08/26/2021    Procedure: BREAST CHELLY LOCALIZED LUMPECTOMY;  Surgeon: Tracie Nath MD;  Location: [facility];  Service: Surgical Oncology    COLONOSCOPY      FOOT SURGERY Left     cyst removal    HYSTERECTOMY      INTRAOPERATIVE RADIATION THERAPY (IORT) Left 08/26/2021    Procedure: BREAST INTRAOPERATIVE RADIATION THERAPY (IORT) BY DR SCANLON;  Surgeon: Tracie Nath MD;  Location: [facility];  Service: Surgical Oncology    LYMPH NODE BIOPSY Left 08/26/2021    Procedure: BIOPSY LYMPH NODE  SENTINEL (NUC MED INJECTION AT 0900);  Surgeon: Tracie Nath MD;  Location: [facility];  Service: Surgical Oncology    SKIN LESION EXCISION  01/2023    removed from right arm- squamous cell carcinoma    TONSILLECTOMY      US BREAST NEEDLE LOC LEFT Left 08/19/2021    US GUIDANCE BREAST BIOPSY LEFT EACH ADDITIONAL Left 07/12/2021    US GUIDED BREAST BIOPSY LEFT COMPLETE Left 07/12/2021     Family History   Problem Relation Age of Onset    Skin cancer Mother     Skin cancer Father         age dx unk     No Known Problems Daughter     No Known Problems Maternal Grandmother     Lung cancer Maternal Grandfather         age dx unk     No Known Problems Paternal Grandmother     No Known Problems Paternal Grandfather     No Known Problems Maternal Aunt     No Known Problems Maternal Aunt     No Known Problems Maternal Aunt     No Known Problems Paternal Aunt     No Known Problems Cousin     No Known Problems Cousin     No Known Problems Cousin     No Known Problems Cousin     Breast cancer Neg Hx      Social History     Socioeconomic History    Marital status: /Civil Union     Spouse name: Not on file    Number of children: Not on file    Years of education: Not on file    Highest education level: Not on file   Occupational History    Not on file   Tobacco Use    Smoking status: Never    Smokeless tobacco: Never   Vaping Use    Vaping status: Never Used   Substance and Sexual Activity    Alcohol use: Yes     Comment: Social Holidays    Drug use: Not Currently    Sexual activity: Yes     Partners: Male     Birth control/protection: None   Other Topics Concern    Not on file   Social History Narrative    Not on file     Social Determinants of Health     Financial Resource Strain: Not on file   Food Insecurity: Not on file   Transportation Needs: Not on file   Physical Activity: Not on file   Stress: Not on file   Social Connections: Not on file   Intimate Partner Violence: Not on file   Housing Stability: Not on file        Current Outpatient Medications:     atorvastatin (LIPITOR) 10 mg tablet, , Disp: , Rfl:     Cholecalciferol (VITAMIN D-3 PO), Take 2,000 Int'l Units by mouth daily, Disp: , Rfl:     exemestane (AROMASIN) 25 MG tablet, TAKE 1 TABLET (25 MG TOTAL) BY MOUTH DAILY., Disp: 90 tablet, Rfl: 1    PARoxetine (PAXIL) 20 mg tablet, 20 mg every morning , Disp: , Rfl:   No Known Allergies  Vitals:    10/14/24 0840   BP: 112/80   Pulse: 81   Temp: 97.5 °F (36.4 °C)   SpO2: 98%       Physical Exam  Vitals reviewed.   Constitutional:       General: She is not in acute distress.     Appearance: Normal appearance. She is well-developed. She is not diaphoretic.   HENT:      Head: Normocephalic and atraumatic.   Cardiovascular:      Rate and Rhythm: Normal rate and regular rhythm.      Heart sounds: Normal heart sounds.   Pulmonary:      Effort: Pulmonary effort is normal.      Breath sounds: Normal breath sounds.   Chest:   Breasts:     Right: No swelling, bleeding, inverted nipple, mass, nipple discharge, skin change or tenderness.      Left: Mass (palpable seroma at lumpectomy site- stable) and skin change (surgical scars) present. No swelling, bleeding, inverted nipple, nipple discharge or tenderness.      Comments: Small probable suture granuloma at lateral aspect of axillary surgical scar- stable per patient  Abdominal:      Palpations: Abdomen is soft. There is no mass.      Tenderness: There is no abdominal tenderness.   Musculoskeletal:         General: Normal range of motion.      Cervical back: Normal range of motion.   Lymphadenopathy:      Upper Body:      Right upper body: No supraclavicular or axillary adenopathy.      Left upper body: No supraclavicular or axillary adenopathy.   Skin:     General: Skin is warm and dry.      Findings: No rash.   Neurological:      Mental Status: She is alert and oriented to person, place, and time.   Psychiatric:         Speech: Speech normal.           Advance Care  Planning/Advance Directives:  Discussed disease status, cancer treatment plans and/or cancer treatment goals with the patient.

## 2024-10-15 ENCOUNTER — OFFICE VISIT (OUTPATIENT)
Dept: PODIATRY | Facility: CLINIC | Age: 69
End: 2024-10-15
Payer: MEDICARE

## 2024-10-15 DIAGNOSIS — M21.621 TAILOR'S BUNIONETTE, RIGHT: ICD-10-CM

## 2024-10-15 DIAGNOSIS — Q66.72 PES CAVUS OF BOTH FEET: Primary | ICD-10-CM

## 2024-10-15 DIAGNOSIS — Q66.71 PES CAVUS OF BOTH FEET: Primary | ICD-10-CM

## 2024-10-15 PROCEDURE — 99203 OFFICE O/P NEW LOW 30 MIN: CPT | Performed by: PODIATRIST

## 2024-10-15 RX ORDER — MELOXICAM 15 MG/1
15 TABLET ORAL DAILY
Qty: 60 TABLET | Refills: 0 | Status: SHIPPED | OUTPATIENT
Start: 2024-10-15 | End: 2024-12-14

## 2024-10-15 NOTE — PROGRESS NOTES
Ambulatory Visit  Name: An Mcwilliams      : 1955      MRN: [id number]  Encounter Provider: Jeremías Morel DPM  Encounter Date: 10/15/2024   Encounter department: [facility] [place]    Explained to patient that she is dealing with foot pain due to pes cavus foot type.  She was referred to [facility] physical therapy for flexible orthotics to be used in a running shoe.  She was told to discontinue naproxen and instead was placed on meloxicam 15 mg daily for 60 days.  The goal is to discontinue this medication once in the orthotics.  Reappoint 2 months.    Assessment & Plan  Pes cavus of both feet    Orders:    Ambulatory referral to Physical Therapy; Future    meloxicam (Mobic) 15 mg tablet; Take 1 tablet (15 mg total) by mouth daily    Brandon's bunionette, right    Orders:    meloxicam (Mobic) 15 mg tablet; Take 1 tablet (15 mg total) by mouth daily      History of Present Illness     An Mcwilliams is a 69 y.o. female who presents with chronic pain in her right foot.  The patient notes that if she is on her feet for any length of time, for example 4 hours daily, she developed sharp pain at the bottom of the right foot in the area of the fifth metatarsal.  The patient does have a tailor's bunion of the right foot but it does not seem as if the bunion is the issue.  She has minimal left foot pain.    Patient notes that she also has difficulty with the right hip.  She takes Aleve infrequently but does find it very helpful.    Patient is under medical care for breast cancer.  She is taking oral chemo.    The type of pain that she is reporting in the right foot does not appear neurologic in origin.      Review of Systems   Constitutional:         History of breast cancer   Cardiovascular:         History of TIA   Musculoskeletal:  Positive for gait problem.               Objective     There were no vitals taken for this visit.    Physical Exam  Constitutional:       Appearance: Normal  appearance.   Cardiovascular:      Pulses: Normal pulses.   Musculoskeletal:         General: Tenderness and deformity present.      Comments: Pes cavus deformity bilateral.  Tailor's bunion present right foot.  Pain with palpation plantar aspect right foot at fifth metatarsal head.   Skin:     General: Skin is warm.   Neurological:      General: No focal deficit present.      Mental Status: She is oriented to person, place, and time.

## 2024-10-16 ENCOUNTER — OFFICE VISIT (OUTPATIENT)
Dept: RADIATION ONCOLOGY | Facility: HOSPITAL | Age: 69
End: 2024-10-16
Attending: RADIOLOGY
Payer: MEDICARE

## 2024-10-16 VITALS
OXYGEN SATURATION: 98 % | HEART RATE: 73 BPM | RESPIRATION RATE: 18 BRPM | SYSTOLIC BLOOD PRESSURE: 122 MMHG | DIASTOLIC BLOOD PRESSURE: 76 MMHG | TEMPERATURE: 97.6 F

## 2024-10-16 DIAGNOSIS — Z17.0 MALIGNANT NEOPLASM OF UPPER-OUTER QUADRANT OF LEFT BREAST IN FEMALE, ESTROGEN RECEPTOR POSITIVE (HCC): Primary | ICD-10-CM

## 2024-10-16 DIAGNOSIS — C50.412 MALIGNANT NEOPLASM OF UPPER-OUTER QUADRANT OF LEFT BREAST IN FEMALE, ESTROGEN RECEPTOR POSITIVE (HCC): Primary | ICD-10-CM

## 2024-10-16 PROCEDURE — 99211 OFF/OP EST MAY X REQ PHY/QHP: CPT | Performed by: RADIOLOGY

## 2024-10-16 PROCEDURE — 99213 OFFICE O/P EST LOW 20 MIN: CPT | Performed by: RADIOLOGY

## 2024-10-16 NOTE — PROGRESS NOTES
An Mcwilliams  1955  [id number]    Radiation Oncology Follow Up    Assessment and plan:    Malignant neoplasm of upper-outer quadrant of left breast in female, estrogen receptor positive (HCC)     Ms. Mcwilliams is a 69-year-old woman who was diagnosed with a stage IA left breast cancer in 2021 at which time she underwent lumpectomy with intraoperative partial breast irradiation in August 2021.  Due to a positive micrometastasis in the sentinel node, she subsequently underwent left whole breast axillary and radiation.  She has been on aromatase inhibitor therapy since that time.  She is doing relatively well on aromatase inhibitor with some possible musculoskeletal effects.  She is also not taking any supplement or medication for underlying osteopenia.  She will discuss this with medical oncology at her next follow-up.    Bilateral diagnostic mammograms were ordered for June 2025.  Patient has requested to continue follow-up with surgical and medical oncology.  Therefore no continued follow-up was scheduled today.  She was encouraged to call should she have any questions or need any further follow-up care.      Interval history:  An Mcwilliams 1955 is a 69 y.o. female with clinical stage IA left breast carcinoma, ER positive/IL negative and HER2 negative. She was  interested in proceeding with breast conservation surgery. She underwent lumpectomy with IORT on 8/26/2021.   On final pathology she was found to have a 9 millimeter invasive ductal carcinoma with no EIC.  Her margins of resection were negative.  She did however have 1 lymph node which contained a micro metastasis. Based on her micro metastasis in the lymph node, adjuvant radiation therapy to the left breast was recommended with high tangential beams to cover the lower axilla.  She completed a course of radiation to the left breast on 12/14/2021. She is maintained on exemestane with good tolerance other than some bone pain. She was last seen on  8/15/23 and is seen for routine posttreatment follow-up visit today.  She denies any suspicious findings on 80 self breast examination and has no pain or swelling in the breast.  She continues to appreciate some thickening in the lumpectomy bed area.  She has good range of motion in the upper extremities with no swelling.  She has an underlying diagnosis of osteopenia for which she is taking no supplement or prescription medication.    Bilateral diagnostic mammograms were performed on June 25, 2024 and showed postsurgical changes in the left breast lumpectomy bed with no suspicious findings bilaterally, BI-RADS Category 2    Oncology History   Malignant neoplasm of upper-outer quadrant of left breast in female, estrogen receptor positive (HCC)   7/12/2021 Initial Diagnosis    Malignant neoplasm of upper-outer quadrant of left breast in female, estrogen receptor positive (HCC)     7/12/2021 Biopsy    A. Breast, left at 02:00 o'clock 7 cm from nipple, ultrasound-guided biopsy (3 passes):  -  Invasive breast carcinoma of no special type (ductal NST/invasive ductal carcinoma).  - grade 1  - ER 95%, PA 0%, HER2 2+, equivocal by IHC but negative on the fish analysis    B. Breast, left at 02:00 o'clock 3 cm from nipple, ultrasound-guided biopsy (2 passes):  -  Benign breast parenchyma with fibrocystic changes including apocrine metaplasia, usual ductal hyperplasia, bold all and microcyst formation with a prominent sclerosed and cystic nodule, cannot exclude old granuloma versus sclerosed cyst.  -  Negative for dysplasia or carcinoma.      8/5/2021 -  Cancer Staged    Staging form: Breast, AJCC 8th Edition  - Clinical: Stage IA (cT1a, cN0, cM0, G1, ER+, PA-, HER2-) - Signed by Tracie Nath MD on 8/5/2021  Stage prefix: Initial diagnosis  Method of lymph node assessment: Clinical  Histologic grading system: 3 grade system       8/26/2021 Surgery    Left breast lumpectomy with SLNB  Surgeon: Dr Tracie Shanks  breast, lumpectomy:  - Invasive breast carcinoma of no special type (ductal NST/invasive ductal carcinoma).  - Ductal carcinoma in situ (DCIS).  - All margins are negative for carcinoma or DCIS.     B. Left breast sentinel lymph node, excision:  - One of three lymph nodes with micrometastases (pN1mi).     Note: Small focus (1 mm) metastatic carcinoma present in permanent section only.     C. Left breast, new superior margin, re-excision:  - Benign fibroadipose tissue.  - Negative for malignancy.     D. Left breast, new medial margin, re-excision:  - Benign breast parenchyma and fibroadipose tissue.  - Negative for malignancy.     8/26/2021 -  Radiation    Treatments not in [facility])  Field Numbers Energy Treatment Site Starting  Ending  Elapsed  Fraction Total  Overlap Site Overlap         Date Date Days Dose Dose   Dose   IORT  50KVp  Left breast  8/26/21 8/26/21 2000 2000 9/9/2021 -  Cancer Staged    Staging form: Breast, AJCC 8th Edition  - Pathologic: Stage IA (pT1b, pN1mi(sn), cM0, G1, ER+, MA-, HER2-) - Signed by Tracie Nath MD on 9/9/2021  Stage prefix: Initial diagnosis  Method of lymph node assessment: [place] lymph node biopsy  Histologic grading system: 3 grade system       11/9/2021 - 12/14/2021 Radiation    BH L BREAST 6X 25 / 25 200 0 5,000 35      Treatment Dates:  11/9/2021 - 12/14/2021.          Patient Active Problem List   Diagnosis    Closed nondisplaced transverse fracture of right patella    Numbness and tingling of left upper and lower extremity    Essential hypertension    TIA (transient ischemic attack)    Malignant neoplasm of upper-outer quadrant of left breast in female, estrogen receptor positive (HCC)    Hyperlipidemia    Anxiety    Arthritis    Seroma of breast    Use of aromatase inhibitors    Has never received COVID-19 vaccine    Cancer Staging   Malignant neoplasm of upper-outer quadrant of left breast in female, estrogen receptor positive (HCC)  Staging form:  Breast, AJCC 8th Edition  - Clinical: Stage IA (cT1a, cN0, cM0, G1, ER+, GA-, HER2-) - Signed by Tracie Nath MD on 8/5/2021  Stage prefix: Initial diagnosis  Method of lymph node assessment: Clinical  Histologic grading system: 3 grade system  - Pathologic: Stage IA (pT1b, pN1mi(sn), cM0, G1, ER+, GA-, HER2-) - Signed by Tracie Nath MD on 9/9/2021  Stage prefix: Initial diagnosis  Method of lymph node assessment: [place] lymph node biopsy  Histologic grading system: 3 grade system    Past Medical History:   Diagnosis Date    Acute URI     Anxiety     Arthritis     Atypical chest pain     Breast cancer (HCC)     Cancer (HCC)     breast    Fibrocystic breast disease     Hypercholesterolemia     Osteopenia     Palpitations     Psoriasis     Rib pain on left side     Skin cancer 01/2023    squamous cell of right arm lesion removed    Viral conjunctivitis      Social History     Socioeconomic History    Marital status: /Civil Union     Spouse name: Not on file    Number of children: Not on file    Years of education: Not on file    Highest education level: Not on file   Occupational History    Not on file   Tobacco Use    Smoking status: Never    Smokeless tobacco: Never   Vaping Use    Vaping status: Never Used   Substance and Sexual Activity    Alcohol use: Yes     Comment: Social Holidays    Drug use: Not Currently    Sexual activity: Yes     Partners: Male     Birth control/protection: None   Other Topics Concern    Not on file   Social History Narrative    Not on file     Social Determinants of Health     Financial Resource Strain: Not on file   Food Insecurity: Not on file   Transportation Needs: Not on file   Physical Activity: Not on file   Stress: Not on file   Social Connections: Not on file   Intimate Partner Violence: Not on file   Housing Stability: Not on file     Family History   Problem Relation Age of Onset    Skin cancer Mother     Skin cancer Father         age dx unk     No Known Problems  Daughter     No Known Problems Maternal Grandmother     Lung cancer Maternal Grandfather         age dx unk     No Known Problems Paternal Grandmother     No Known Problems Paternal Grandfather     No Known Problems Maternal Aunt     No Known Problems Maternal Aunt     No Known Problems Maternal Aunt     No Known Problems Paternal Aunt     No Known Problems Cousin     No Known Problems Cousin     No Known Problems Cousin     No Known Problems Cousin     Breast cancer Neg Hx      Past Surgical History:   Procedure Laterality Date    BREAST BIOPSY      BREAST LUMPECTOMY Left 08/26/2021    Procedure: BREAST CHELLY LOCALIZED LUMPECTOMY;  Surgeon: Tracie Nath MD;  [facility];  Service: Surgical Oncology    COLONOSCOPY      FOOT SURGERY Left     cyst removal    HYSTERECTOMY      INTRAOPERATIVE RADIATION THERAPY (IORT) Left 08/26/2021    Procedure: BREAST INTRAOPERATIVE RADIATION THERAPY (IORT) BY DR SCANLON;  Surgeon: Tracie Nath MD;  [facility];  Service: Surgical Oncology    LYMPH NODE BIOPSY Left 08/26/2021    Procedure: BIOPSY LYMPH NODE SENTINEL (NUC MED INJECTION AT 0900);  Surgeon: Tracie Nath MD;  [facility];  Service: Surgical Oncology    SKIN LESION EXCISION  01/2023    removed from right arm- squamous cell carcinoma    TONSILLECTOMY      US BREAST NEEDLE LOC LEFT Left 08/19/2021    US GUIDANCE BREAST BIOPSY LEFT EACH ADDITIONAL Left 07/12/2021    US GUIDED BREAST BIOPSY LEFT COMPLETE Left 07/12/2021       Current Outpatient Medications:     atorvastatin (LIPITOR) 10 mg tablet, , Disp: , Rfl:     Cholecalciferol (VITAMIN D-3 PO), Take 2,000 Int'l Units by mouth daily, Disp: , Rfl:     exemestane (AROMASIN) 25 MG tablet, TAKE 1 TABLET (25 MG TOTAL) BY MOUTH DAILY., Disp: 90 tablet, Rfl: 1    meloxicam (Mobic) 15 mg tablet, Take 1 tablet (15 mg total) by mouth daily, Disp: 60 tablet, Rfl: 0    PARoxetine (PAXIL) 20 mg tablet, 20 mg every morning , Disp: , Rfl:   No Known  Allergies    Review of Systems:  Review of Systems   Constitutional: Negative.    HENT: Negative.     Eyes: Negative.    Respiratory: Negative.     Cardiovascular: Negative.    Gastrointestinal: Negative.    Endocrine: Negative.    Genitourinary: Negative.    Musculoskeletal:  Positive for arthralgias (feet knees hips).   Skin: Negative.    Allergic/Immunologic: Negative.    Neurological: Negative.    Hematological: Negative.    Psychiatric/Behavioral:  Positive for sleep disturbance.         Physical Exam:  Physical Exam  Vitals and nursing note reviewed.   Constitutional:       General: She is not in acute distress.     Appearance: Normal appearance.   HENT:      Nose: No congestion.   Eyes:      General: No scleral icterus.     Extraocular Movements: Extraocular movements intact.      Pupils: Pupils are equal, round, and reactive to light.   Pulmonary:      Effort: Pulmonary effort is normal. No respiratory distress.   Chest:   Breasts:     Right: Normal.      Left: No mass, skin change or tenderness.      Comments: Left breast: Well-healed lumpectomy incision in the upper outer breast axillary tail, sick plaque of palpable fibrosis in the lumpectomy bed with no suspicious masses or skin changes.  Musculoskeletal:         General: No swelling. Normal range of motion.      Cervical back: Normal range of motion.   Lymphadenopathy:      Cervical: No cervical adenopathy.      Upper Body:      Right upper body: No supraclavicular or axillary adenopathy.      Left upper body: No supraclavicular or axillary adenopathy.   Skin:     General: Skin is warm and dry.   Neurological:      General: No focal deficit present.      Mental Status: She is alert and oriented to person, place, and time.   Psychiatric:         Mood and Affect: Mood normal.         Thought Content: Thought content normal.         Judgment: Judgment normal.         LABS:    CBC  Diff   Lab Results   Component Value Date/Time    WBC 6.35 11/14/2021 11:02  AM    HGB 12.7 11/14/2021 11:02 AM    HCT 39.0 11/14/2021 11:02 AM    RBC 4.05 11/14/2021 11:02 AM    MCV 96 11/14/2021 11:02 AM    MCHC 32.6 11/14/2021 11:02 AM    MCH 31.4 11/14/2021 11:02 AM    RDW 12.5 11/14/2021 11:02 AM    MPV 11.1 11/14/2021 11:02 AM    Lab Results   Component Value Date/Time    LYMPHSABS 1.16 11/14/2021 11:02 AM    EOSABS 0.10 11/14/2021 11:02 AM    MONOSABS 0.72 11/14/2021 11:02 AM    BASOSABS 0.05 11/14/2021 11:02 AM        Basic Metabolic Profile    Lab Results   Component Value Date/Time    SODIUM 139 10/18/2023 11:11 AM    CO2 24 10/18/2023 11:11 AM    Lab Results   Component Value Date/Time    BUN 12 10/18/2023 11:11 AM    CREATININE 0.87 10/18/2023 11:11 AM          I spent 30 minutes reviewing the interval medical record, past medical history and radiation records, interval laboratories, imaging studies and the reports, discussing follow-up care with the patient and performing physical examination.

## 2024-10-16 NOTE — PROGRESS NOTES
[facility] 1955 [facility] 69 y.o. female with h/o Left breast ER+, NJ-,HER2- cancer. She had a lumpectomy with IORT 8/26/2021. She had 1 lymph node with micro metastases. She completed a course radiation to Left breast on 12/14/2021. She was last seen by  on 8/15/2023. She returns today for a follow up.     4/8/24 Vidya  She is currently taking exemestane.She is doing well. Mammogram in June. Follow up 6 months.    5/7/24   Continues exemestane. No presence of reoccurrence. Patient sees radiation oncology, surgical oncology for follow up care. Will see back in 6 months.     6/25/24 Mammo diagnostic bilateral w 3d&cad  IMPRESSION:   No evidence of malignancy.  ASSESSMENT/BI-RADS CATEGORY:  Left: 2 - Benign  Right: 2 - Benign  Overall: 2 - Benign  RECOMMENDATION:       - Routine screening mammogram in 1 year for both breasts.      10/14/24 Nasir Dasilva  currently maintained on exemestane.  She had a bilateral 3D diagnostic mammogram in June 2024 which was BI-RADS 2, category 2 density. No worrisome findings on today's clinical exam. She complains today of some worsening generalized arthralgias.  She is going to discuss this with her medical oncologist with regards to aromatase inhibitor use.  Otherwise she offers no new complaints today and there are no worrisome findings on today's clinical exam.        Upcoming  11/07/24   6/2/25            Oncology History   Malignant neoplasm of upper-outer quadrant of left breast in female, estrogen receptor positive (HCC)   7/12/2021 Initial Diagnosis    Malignant neoplasm of upper-outer quadrant of left breast in female, estrogen receptor positive (HCC)     7/12/2021 Biopsy    A. Breast, left at 02:00 o'clock 7 cm from nipple, ultrasound-guided biopsy (3 passes):  -  Invasive breast carcinoma of no special type (ductal NST/invasive ductal carcinoma).  - grade 1  - ER 95%, NJ 0%, HER2 2+, equivocal by IHC but negative on the fish  analysis    B. Breast, left at 02:00 o'clock 3 cm from nipple, ultrasound-guided biopsy (2 passes):  -  Benign breast parenchyma with fibrocystic changes including apocrine metaplasia, usual ductal hyperplasia, bold all and microcyst formation with a prominent sclerosed and cystic nodule, cannot exclude old granuloma versus sclerosed cyst.  -  Negative for dysplasia or carcinoma.      8/5/2021 -  Cancer Staged    Staging form: Breast, AJCC 8th Edition  - Clinical: Stage IA (cT1a, cN0, cM0, G1, ER+, [place]-, HER2-) - Signed by Tracie Nath MD on 8/5/2021  Stage prefix: Initial diagnosis  Method of lymph node assessment: Clinical  Histologic grading system: 3 grade system       8/26/2021 Surgery    Left breast lumpectomy with SLNB  Surgeon: Dr Tracie Nath    A. Left breast, lumpectomy:  - Invasive breast carcinoma of no special type (ductal NST/invasive ductal carcinoma).  - Ductal carcinoma in situ (DCIS).  - All margins are negative for carcinoma or DCIS.     B. Left breast sentinel lymph node, excision:  - One of three lymph nodes with micrometastases (pN1mi).     Note: Small focus (1 mm) metastatic carcinoma present in permanent section only.     C. Left breast, new superior margin, re-excision:  - Benign fibroadipose tissue.  - Negative for malignancy.     D. Left breast, new medial margin, re-excision:  - Benign breast parenchyma and fibroadipose tissue.  - Negative for malignancy.     8/26/2021 -  Radiation    Treatments not in [facility])  Field Numbers Energy Treatment Site Starting  Ending  Elapsed  Fraction Total  Overlap Site Overlap         Date Date Days Dose Dose   Dose   IORT  50KVp  Left breast  8/26/21 8/26/21 2000 2000 9/9/2021 -  Cancer Staged    Staging form: Breast, AJCC 8th Edition  - Pathologic: Stage IA (pT1b, pN1mi(sn), cM0, G1, ER+, OK-, HER2-) - Signed by Tracie Nath MD on 9/9/2021  Stage prefix: Initial diagnosis  Method of lymph node assessment: [place] lymph node  biopsy  Histologic grading system: 3 grade system       11/9/2021 - 12/14/2021 Radiation    BH L BREAST 6X 25 / 25 200 0 5,000 35      Treatment Dates:  11/9/2021 - 12/14/2021.          Review of Systems:  Review of Systems   Constitutional: Negative.    HENT: Negative.     Eyes: Negative.    Respiratory: Negative.     Cardiovascular: Negative.    Gastrointestinal: Negative.    Endocrine: Negative.    Genitourinary: Negative.    Musculoskeletal:  Positive for arthralgias (feet knees hips).   Skin: Negative.    Allergic/Immunologic: Negative.    Neurological: Negative.    Hematological: Negative.    Psychiatric/Behavioral:  Positive for sleep disturbance.        Clinical Trial: no      Health Maintenance   Topic Date Due    Hepatitis C Screening  Never done    Medicare Annual Wellness Visit (AWV)  Never done    COVID-19 Vaccine (1) Never done    BMI: Followup Plan  Never done    Zoster Vaccine (1 of 2) Never done    Osteoporosis Screening  Never done    RSV Vaccine Age 60+ Years (1 - 1-dose 60+ series) Never done    Urinary Incontinence Screening  Never done    Fall Risk  05/09/2023    Influenza Vaccine (1) 09/01/2024    BMI: Adult  05/17/2025    Breast Cancer Screening: Mammogram  06/25/2025    Depression Screening  10/16/2025    Colorectal Cancer Screening  05/17/2027    Pneumococcal Vaccine: 65+ Years  Completed    RSV Vaccine age 0-20 Months  Aged Out    HIB Vaccine  Aged Out    IPV Vaccine  Aged Out    Hepatitis A Vaccine  Aged Out    Meningococcal ACWY Vaccine  Aged Out    HPV Vaccine  Aged Out     Patient Active Problem List   Diagnosis    Closed nondisplaced transverse fracture of right patella    Numbness and tingling of left upper and lower extremity    Essential hypertension    TIA (transient ischemic attack)    Malignant neoplasm of upper-outer quadrant of left breast in female, estrogen receptor positive (HCC)    Hyperlipidemia    Anxiety    Arthritis    Seroma of breast    Use of aromatase inhibitors     Has never received COVID-19 vaccine     Past Medical History:   Diagnosis Date    Acute URI     Anxiety     Arthritis     Atypical chest pain     Breast cancer (HCC)     Cancer (HCC)     breast    Fibrocystic breast disease     Hypercholesterolemia     Osteopenia     Palpitations     Psoriasis     Rib pain on left side     Skin cancer 01/2023    squamous cell of right arm lesion removed    Viral conjunctivitis      Past Surgical History:   Procedure Laterality Date    BREAST BIOPSY      BREAST LUMPECTOMY Left 08/26/2021    Procedure: BREAST CHELLY LOCALIZED LUMPECTOMY;  Surgeon: Tracie Nath MD;  [facility];  Service: Surgical Oncology    COLONOSCOPY      FOOT SURGERY Left     cyst removal    HYSTERECTOMY      INTRAOPERATIVE RADIATION THERAPY (IORT) Left 08/26/2021    Procedure: BREAST INTRAOPERATIVE RADIATION THERAPY (IORT) BY DR SCANLON;  Surgeon: Tracie Nath MD;  [facility];  Service: Surgical Oncology    LYMPH NODE BIOPSY Left 08/26/2021    Procedure: BIOPSY LYMPH NODE SENTINEL (NUC MED INJECTION AT 0900);  Surgeon: Tracie Nath MD;  [facility];  Service: Surgical Oncology    SKIN LESION EXCISION  01/2023    removed from right arm- squamous cell carcinoma    TONSILLECTOMY      US BREAST NEEDLE LOC LEFT Left 08/19/2021    US GUIDANCE BREAST BIOPSY LEFT EACH ADDITIONAL Left 07/12/2021    US GUIDED BREAST BIOPSY LEFT COMPLETE Left 07/12/2021     Family History   Problem Relation Age of Onset    Skin cancer Mother     Skin cancer Father         age dx unk     No Known Problems Daughter     No Known Problems Maternal Grandmother     Lung cancer Maternal Grandfather         age dx unk     No Known Problems Paternal Grandmother     No Known Problems Paternal Grandfather     No Known Problems Maternal Aunt     No Known Problems Maternal Aunt     No Known Problems Maternal Aunt     No Known Problems Paternal Aunt     No Known Problems Cousin     No Known Problems Cousin     No Known Problems  Cousin     No Known Problems Cousin     Breast cancer Neg Hx      Social History     Socioeconomic History    Marital status: /Civil Union     Spouse name: Not on file    Number of children: Not on file    Years of education: Not on file    Highest education level: Not on file   Occupational History    Not on file   Tobacco Use    Smoking status: Never    Smokeless tobacco: Never   Vaping Use    Vaping status: Never Used   Substance and Sexual Activity    Alcohol use: Yes     Comment: Social Holidays    Drug use: Not Currently    Sexual activity: Yes     Partners: Male     Birth control/protection: None   Other Topics Concern    Not on file   Social History Narrative    Not on file     Social Determinants of Health     Financial Resource Strain: Not on file   Food Insecurity: Not on file   Transportation Needs: Not on file   Physical Activity: Not on file   Stress: Not on file   Social Connections: Not on file   Intimate Partner Violence: Not on file   Housing Stability: Not on file       Current Outpatient Medications:     atorvastatin (LIPITOR) 10 mg tablet, , Disp: , Rfl:     Cholecalciferol (VITAMIN D-3 PO), Take 2,000 Int'l Units by mouth daily, Disp: , Rfl:     exemestane (AROMASIN) 25 MG tablet, TAKE 1 TABLET (25 MG TOTAL) BY MOUTH DAILY., Disp: 90 tablet, Rfl: 1    meloxicam (Mobic) 15 mg tablet, Take 1 tablet (15 mg total) by mouth daily, Disp: 60 tablet, Rfl: 0    PARoxetine (PAXIL) 20 mg tablet, 20 mg every morning , Disp: , Rfl:   No Known Allergies  Vitals:    10/16/24 1144   BP: 122/76   BP Location: Right arm   Pulse: 73   Resp: 18   Temp: 97.6 °F (36.4 °C)   TempSrc: Temporal   SpO2: 98%

## 2024-10-22 ENCOUNTER — EVALUATION (OUTPATIENT)
Dept: PHYSICAL THERAPY | Facility: OTHER | Age: 69
End: 2024-10-22
Payer: COMMERCIAL

## 2024-10-22 DIAGNOSIS — Q66.72 PES CAVUS OF BOTH FEET: ICD-10-CM

## 2024-10-22 DIAGNOSIS — Q66.71 PES CAVUS OF BOTH FEET: ICD-10-CM

## 2024-10-22 PROCEDURE — 97760 ORTHOTIC MGMT&TRAING 1ST ENC: CPT | Performed by: PHYSICAL THERAPIST

## 2024-10-22 NOTE — PROGRESS NOTES
Orthotic Evaluation    Today's date: 10/22/24  Patient name: An Mcwilliams  : 1955  MRN: [id number]  Referring provider: Jeremías Morel DPM  Dx:   Encounter Diagnosis     ICD-10-CM    1. Pes cavus of both feet  Q66.71 Ambulatory referral to Physical Therapy    Q66.72                       Subjective:    An presents today for orthotic evaluation. she complains of pain with functional activities including ambulation and work. The patient plans to use her orthotic for walking and standing. The orthotic will be placed in a standard, size 7 . Foot pain under the little toe and bala bunion. She is on her feet for volunteering. R foot more than L. H/o PF. Kink in the foot on occasion on random occasions. Getting out of bed will also bother her. Estrogen blocker for breast cancer and it seems to make her pain worse- side effect. Wants to do more. Mostly standing in one spot at volunteering and that bothers her more than walking. Wears a sneaker when working Typically wears flip flops. House wears ugg slippers. Cyst taken off the top of the R foot in the past and sprained her ankle. Wants to return to walking more. She's been on this med for 3 years and does bother her BLE joints.    Objective:    Age: 69 y.o.  Weight: 153    Foot Posture Index Score    Right Foot  Talar dome - +1  Malleolar curve - 0   Calcaneal eversion - 0  Talonavicular bulge - +1  Medial longitudinal arch - 0  Too many toes - 0  Total Score R = 2    Left Foot  Talar dome - +1  Malleolar curve - 0  Calcaneal eversion - 0  Talonavicular bulge - +1  Medial longitudinal arch - 0  Too many toes - 0  Total Score L = 2    Reference Values  Normal =    0 to +5  Pronated =  +6 to +9 Highly Pronated =  10+  Supinated =   -1 to -4 Highly Supinated = -5 to -12    Objective     Active Range of Motion   Left Ankle/Foot   Normal active range of motion    Right Ankle/Foot   Normal active range of motion    Joint Play   Left Ankle/Foot  Joints within  functional limits are the talocrural joint, subtalar joint, midfoot and forefoot.     Right Ankle/Foot  Joints within functional limits are the talocrural joint, subtalar joint, midfoot and forefoot.     DL squat: good DF, some ER bilaterally, able to maintain heels on the ground    Supination at heel strike with ability to cross to pronation through stance and good toe off mechanics without ER   DL HR: good mobility    Assessment:    Patient requires custom foot orthosis with met bar and sesmoid cut out to correct altered gait mechanics. Patient is not currently controlled by her current shoe wear. Patient was educated on the design of the custom orthotic and it's ability to offload her current pathology. Patient was also educated on potential shoe wear changes with device, break-in period, and skin checks to avoid potential skin break down.     Orthotic goals:  1) Patient will have custom foot orthoses fitted to her shoe.   2) Patient will be compliant with break-in period of custom foot orthoses as prescribed by PT.   3) Patient will be compliant with custom foot orthoses use as prescribed by PT.     Plan:    Planned therapy interventions: orthotic fitting/training  Duration in visits: 2

## 2024-10-26 NOTE — ADDENDUM NOTE
Addended by: Lorri Robles on: 8/5/2021 02:15 PM     Modules accepted: Orders moderate assist (50% patients effort)

## 2024-11-05 ENCOUNTER — OFFICE VISIT (OUTPATIENT)
Dept: PHYSICAL THERAPY | Facility: OTHER | Age: 69
End: 2024-11-05
Payer: COMMERCIAL

## 2024-11-05 DIAGNOSIS — Q66.72 PES CAVUS OF BOTH FEET: Primary | ICD-10-CM

## 2024-11-05 DIAGNOSIS — Q66.71 PES CAVUS OF BOTH FEET: Primary | ICD-10-CM

## 2024-11-05 PROCEDURE — L3010 FOOT LONGITUDINAL ARCH SUPPO: HCPCS | Performed by: PHYSICAL THERAPIST

## 2024-11-06 NOTE — PROGRESS NOTES
Hematology/Oncology Outpatient Office Note  Date of Service: 11/7/2024    [facility] [address] [place] [id number]  [phone]    Reason for Consultation:   Chief Complaint   Patient presents with    Follow-up     Referral Physician: No ref. provider found  Primary Care Physician:  Endy Banegas MD     Assessment/plan:     1.  Left breast cancer, stage IB ( pT1b, pN1a (mi), M0) grade 1, ER 90% positive, NV negative, HER2 fish negative disease.  Oncotype DX recurrence score 14.   Diagnosed in August 2021.  2.  Current use of aromatase inhibitor    An Mcwilliams is a 69 y.o. postmenopausal female with stage IB left breast cancer, grade 1, ER 90% positive, NV negative, HER2 FISH negative disease.  She underwent lumpectomy and sentinel lymph node biopsy, resulting in LEO.  She had 1 positive lymph node with micrometastasis.  Her Oncotype DX recurrence score 14.  She completed adjuvant radiation.  She was previously taking anastrozole, but due to musculoskeletal symptoms subsequently switched to exemestane which she continues to take.  She continues to do well from oncology perspective.  No clinical evidence of disease recurrence.  Labs reviewed in care everywhere from October 2023.  She has lab monitoring with her PCP on a yearly basis.  She will continue exemestane 25 mg daily.  She continues to follow with radiation oncology and breast surgery.  Close follow-up with her PCP for ongoing bone health surveillance.  Recommend calcium/vitamin D supplementation.  Office follow-up in 6 months.    Recommend follow-up with her PCP to evaluate sciatica discomfort.     Diagnosis ICD-10-CM Associated Orders   1. Malignant neoplasm of upper-outer quadrant of left breast in female, estrogen receptor positive (HCC)  C50.412     Z17.0       2. Use of aromatase inhibitors  Z79.811           Return in about 6 months (around 5/7/2025) for Office  Visit.    Erin Noland DO 11/7/2024   Hematology & Medical Oncology Physician    Oncology history:     Oncology History   Malignant neoplasm of upper-outer quadrant of left breast in female, estrogen receptor positive (HCC)   7/12/2021 Initial Diagnosis    Malignant neoplasm of upper-outer quadrant of left breast in female, estrogen receptor positive (HCC)     7/12/2021 Biopsy    A. Breast, left at 02:00 o'clock 7 cm from nipple, ultrasound-guided biopsy (3 passes):  -  Invasive breast carcinoma of no special type (ductal NST/invasive ductal carcinoma).  - grade 1  - ER 95%, VA 0%, HER2 2+, equivocal by IHC but negative on the fish analysis    B. Breast, left at 02:00 o'clock 3 cm from nipple, ultrasound-guided biopsy (2 passes):  -  Benign breast parenchyma with fibrocystic changes including apocrine metaplasia, usual ductal hyperplasia, bold all and microcyst formation with a prominent sclerosed and cystic nodule, cannot exclude old granuloma versus sclerosed cyst.  -  Negative for dysplasia or carcinoma.      8/5/2021 -  Cancer Staged    Staging form: Breast, AJCC 8th Edition  - Clinical: Stage IA (cT1a, cN0, cM0, G1, ER+, VA-, HER2-) - Signed by Tracie Nath MD on 8/5/2021  Stage prefix: Initial diagnosis  Method of lymph node assessment: Clinical  Histologic grading system: 3 grade system       8/26/2021 Surgery    Left breast lumpectomy with SLNB  Surgeon: Dr Tracie Nath    A. Left breast, lumpectomy:  - Invasive breast carcinoma of no special type (ductal NST/invasive ductal carcinoma).  - Ductal carcinoma in situ (DCIS).  - All margins are negative for carcinoma or DCIS.     B. Left breast sentinel lymph node, excision:  - One of three lymph nodes with micrometastases (pN1mi).     Note: Small focus (1 mm) metastatic carcinoma present in permanent section only.     C. Left breast, new superior margin, re-excision:  - Benign fibroadipose tissue.  - Negative for malignancy.     D. Left breast, new medial  margin, re-excision:  - Benign breast parenchyma and fibroadipose tissue.  - Negative for malignancy.     8/26/2021 -  Radiation    Treatments not in [facility])  Field Numbers Energy Treatment Site Starting  Ending  Elapsed  Fraction Total  Overlap Site Overlap         Date Date Days Dose Dose   Dose   IORT  50KVp  Left breast  8/26/21 8/26/21 2000 2000 9/9/2021 -  Cancer Staged    Staging form: Breast, AJCC 8th Edition  - Pathologic: Stage IA (pT1b, pN1mi(sn), cM0, G1, ER+, NJ-, HER2-) - Signed by Tracie Nath MD on 9/9/2021  Stage prefix: Initial diagnosis  Method of lymph node assessment: Frannie lymph node biopsy  Histologic grading system: 3 grade system       11/9/2021 - 12/14/2021 Radiation    BH L BREAST 6X 25 / 25 200 0 5,000 35      Treatment Dates:  11/9/2021 - 12/14/2021.        Primary Diagnosis:  Left breast cancer, stage IB (pT1b, pN1a(mi), M0) grade 1, ER 90% positive, NJ negative, HER2 fish negative disease.  Oncotype DX recurrence score 14.   Diagnosed in August 2021.     Previous Hematologic/ Oncologic Treatment:   Adjuvant hormonal therapy with anastrozole from October 2021 through October 2022.     Current Hematologic/ Oncologic Treatment:    Adjuvant hormonal therapy with exemestane since October 2022.    History of present illness:   An Mcwilliams is a 68 year-old postmenopausal woman who was found to have radiographic abnormality in her right breast based on a screening mammography.  Therefore, she underwent right breast biopsy in July 12, 2021, which showed invasive ductal carcinoma, grade 1. This was ER 90% positive, NJ negative, HER2 2+ disease.  HER2 fish was negative for gene application with ratio of 1.0.  She subsequent underwent lumpectomy and sentinel lymph node biopsy by Dr. Nath in August 26, 2021. She had 9 x 8 x 5 mm of invasive ductal carcinoma, grade 1. Lymphovascular invasion was present.  1/3 sentinel lymph nodes or had micrometastasis measuring 1 mm.  There  was no evidence of extranodal extension.  She had intraoperative radiation therapy at the time of lumpectomy.  Her Oncotype DX recurrence score was 14.  Therefore, adjuvant chemotherapy was not indicated. since October 2021, she was on adjuvant hormonal therapy with anastrozole until October 2022, when she developed significant musculoskeletal symptoms.  Therefore, she switched her hormonal therapy to exemestane.  She was previously following Dr. Gurrola last seen in the office on 5/3/2023.  She presents today for follow-up/transfer of care visit.  She continues to take exemestane without any adverse effects.  She denies any chest pain, palpitations or respiratory symptoms.  Denies any bone pain.  Weight remains stable.  She does note intermittent insomnia.    Interval history 11/7/2024:  She had bilateral diagnostic mammogram in June 2024 which showed no evidence of malignancy.  She continues to follow with breast surgeon and radiation oncology.  She is currently on treatment with exemestane.  She denies any chest pain, palpitations, respiratory symptoms or bone pain.  She does note discomfort in her right hip with intermittent radiation likely sciatica.  She is seeing podiatry and has been fitted for an orthotic for her shoes.    Review of systems:   Review of Systems   Constitutional: Negative. Negative for fever and malaise/fatigue.   HENT: Negative.     Cardiovascular: Negative.  Negative for chest pain, dyspnea on exertion and palpitations.   Respiratory: Negative.  Negative for shortness of breath.    Hematologic/Lymphatic: Negative.  Does not bruise/bleed easily.   Skin: Negative.    Musculoskeletal: Negative.    Gastrointestinal: Negative.  Negative for abdominal pain.   Neurological: Negative.    Psychiatric/Behavioral:  The patient has insomnia.         A 10-point review of system was performed, pertinent positive and negative were detailed as above. Otherwise, the 10-point review of system was  negative.    Past Medical History:   Diagnosis Date    Acute URI     Anxiety     Arthritis     Atypical chest pain     Breast cancer (HCC)     Cancer (HCC)     breast    Fibrocystic breast disease     Hypercholesterolemia     Osteopenia     Palpitations     Psoriasis     Rib pain on left side     Skin cancer 01/2023    squamous cell of right arm lesion removed    Viral conjunctivitis        Past Surgical History:   Procedure Laterality Date    BREAST BIOPSY      BREAST LUMPECTOMY Left 08/26/2021    Procedure: BREAST CHELLY LOCALIZED LUMPECTOMY;  Surgeon: Tracie Nath MD;  [facility];  Service: Surgical Oncology    COLONOSCOPY      FOOT SURGERY Left     cyst removal    HYSTERECTOMY      INTRAOPERATIVE RADIATION THERAPY (IORT) Left 08/26/2021    Procedure: BREAST INTRAOPERATIVE RADIATION THERAPY (IORT) BY DR SCANLON;  Surgeon: Tracie Nath MD;  [facility];  Service: Surgical Oncology    LYMPH NODE BIOPSY Left 08/26/2021    Procedure: BIOPSY LYMPH NODE SENTINEL (NUC MED INJECTION AT 0900);  Surgeon: Tracie Nath MD;  [facility];  Service: Surgical Oncology    SKIN LESION EXCISION  01/2023    removed from right arm- squamous cell carcinoma    TONSILLECTOMY      US BREAST NEEDLE LOC LEFT Left 08/19/2021    US GUIDANCE BREAST BIOPSY LEFT EACH ADDITIONAL Left 07/12/2021    US GUIDED BREAST BIOPSY LEFT COMPLETE Left 07/12/2021       Family History   Problem Relation Age of Onset    Skin cancer Mother     Skin cancer Father         age dx unk     No Known Problems Daughter     No Known Problems Maternal Grandmother     Lung cancer Maternal Grandfather         age dx unk     No Known Problems Paternal Grandmother     No Known Problems Paternal Grandfather     No Known Problems Maternal Aunt     No Known Problems Maternal Aunt     No Known Problems Maternal Aunt     No Known Problems Paternal Aunt     No Known Problems Cousin     No Known Problems Cousin     No Known Problems Cousin     No Known  Problems Cousin     Breast cancer Neg Hx        Social History     Socioeconomic History    Marital status: /Civil Union     Spouse name: Not on file    Number of children: Not on file    Years of education: Not on file    Highest education level: Not on file   Occupational History    Not on file   Tobacco Use    Smoking status: Never    Smokeless tobacco: Never   Vaping Use    Vaping status: Never Used   Substance and Sexual Activity    Alcohol use: Yes     Comment: Social Holidays    Drug use: Not Currently    Sexual activity: Yes     Partners: Male     Birth control/protection: None   Other Topics Concern    Not on file   Social History Narrative    Not on file     Social Determinants of Health     Financial Resource Strain: Not on file   Food Insecurity: Not on file   Transportation Needs: Not on file   Physical Activity: Not on file   Stress: Not on file   Social Connections: Not on file   Intimate Partner Violence: Not on file   Housing Stability: Not on file       No Known Allergies    Current Outpatient Medications   Medication Sig Dispense Refill    atorvastatin (LIPITOR) 10 mg tablet       Cholecalciferol (VITAMIN D-3 PO) Take 2,000 Int'l Units by mouth daily      exemestane (AROMASIN) 25 MG tablet TAKE 1 TABLET (25 MG TOTAL) BY MOUTH DAILY. 90 tablet 1    meloxicam (Mobic) 15 mg tablet Take 1 tablet (15 mg total) by mouth daily 60 tablet 0    PARoxetine (PAXIL) 20 mg tablet 20 mg every morning        No current facility-administered medications for this visit.     I have reviewed the relevant past medical, surgical, social and family history. I have also reviewed allergies and medications for this patient.    Objective:      Vitals:    11/07/24 1031   BP: 116/82   Pulse: 88   Resp: 16   Temp: (!) 97.4 °F (36.3 °C)   SpO2: 98%         Wt Readings from Last 3 Encounters:   11/07/24 69.4 kg (153 lb)   05/17/24 67.6 kg (149 lb)   05/07/24 70.1 kg (154 lb 8 oz)       Physical Exam  Vitals and nursing  note reviewed.   Constitutional:       General: She is not in acute distress.     Appearance: She is well-developed.      Comments: Declined breast exam as she recently had it done with radiation oncology   HENT:      Head: Normocephalic and atraumatic.   Eyes:      Conjunctiva/sclera: Conjunctivae normal.   Cardiovascular:      Rate and Rhythm: Normal rate and regular rhythm.      Heart sounds: No murmur heard.  Pulmonary:      Effort: Pulmonary effort is normal. No respiratory distress.      Breath sounds: Normal breath sounds.   Chest:      Comments:     Abdominal:      Palpations: Abdomen is soft.      Tenderness: There is no abdominal tenderness.   Musculoskeletal:         General: No swelling.      Cervical back: Neck supple.   Skin:     General: Skin is warm and dry.      Capillary Refill: Capillary refill takes less than 2 seconds.   Neurological:      Mental Status: She is alert.   Psychiatric:         Mood and Affect: Mood normal.         Data Review:  Pathology Result:  Final Diagnosis   Date Value Ref Range Status   05/17/2024   Final    A. ASCENDING COLON, POLYP, POLYPECTOMY:    - Fragments of tubular adenoma.    - Negative for high-grade dysplasia.       B. RECTUM, POLYP, POLYPECTOMY:    - Polypoid fragment of colorectal mucosa within normal limits.    - Negative for chronic, active and microscopic colitides.    - Negative for granulomata, dysplasia and malignancy.            08/26/2021   Final    A. Left breast, lumpectomy:  - Invasive breast carcinoma of no special type (ductal NST/invasive ductal carcinoma).  - Ductal carcinoma in situ (DCIS).  - All margins are negative for carcinoma or DCIS.    B. Left breast sentinel lymph node, excision:  - One of three lymph nodes with micrometastases (pN1mi).    Note: Small focus (1 mm) metastatic carcinoma present in permanent section only.    C. Left breast, new superior margin, re-excision:  - Benign fibroadipose tissue.  - Negative for malignancy.    D.  Left breast, new medial margin, re-excision:  - Benign breast parenchyma and fibroadipose tissue.  - Negative for malignancy.    Comment: Immunohistochemistry for E-cadherin, p120 show membranous pattern; p63 and calponin are negative.  Intradepartmental consultation is in agreement.  If clinically indicated, the most appropriate tissue block(s) for ancillary testing are block(s):  A6  Dr. Dr. Nath is notified of the diagnosis in EPIC via [place] on 9/1/2021  at 4:50 pm .         Comment:     This is an appended report. These results have been appended to a previously preliminary verified report.   07/12/2021   Final    A. Breast, left at 02:00 o'clock 7 cm from nipple, ultrasound-guided biopsy (3 passes):  -  Invasive breast carcinoma of no special type (ductal NST/invasive ductal carcinoma).   * Emelia grade 1 of 3 (total score: 4 of 9)    -- tubule formation >75%, score 1    -- nuclear grade 2 of 3, score 2    -- mitoses < 3/mm2, (</= 7 mitoses/10HPF), score 1.   * Confirmed by tumor cell immunophenotype:    -- negative:   calponin-B, S100.    * Invasive carcinoma involves 3 of 3 submitted core biopsies, max. dimension = 6 millimeters.   * Estrogen, progesterone & HER2 receptor studies pending, to be described in a separate receptor report.    * Ductal carcinoma in situ (DCIS): Not identified.    * Lymph-vascular invasion: Not identified.    * Microcalcifications: Present.     B. Breast, left at 02:00 o'clock 3 cm from nipple, ultrasound-guided biopsy (2 passes):  -  Benign breast parenchyma with fibrocystic changes including apocrine metaplasia, usual ductal hyperplasia, bold all and microcyst formation with a prominent sclerosed and cystic nodule, cannot exclude old granuloma versus sclerosed cyst.  -  Negative for dysplasia or carcinoma.   -  Immunohistochemical stain performed with appropriate controls for CK5/6 shows mosaic epithelial staining in the hyperplasia, supporting the diagnosis.      Note:     1. Intradepartmental consultation concurs   2. Final report is telephonically messaged to Ina Vu on 7/16/21 at 1212.          Image Results:  Image result are reviewed and documented in Hematology/Oncology history    Mammo diagnostic bilateral w 3d & cad  Narrative: DIAGNOSIS: Mammogram abnormal     TECHNIQUE:  Digital diagnostic mammography was performed. Computer Aided Detection   (CAD) analyzed all applicable images.    COMPARISONS: Multiple prior exams dated between 6/16/2009 and 6/20/2023.    RELEVANT HISTORY:   Family Breast Cancer History: History of breast cancer in Neg Hx.  Family Medical History: No known relevant family medical history.   Personal History: Hormone history includes birth control. Surgical history   includes breast biopsy, lumpectomy, and hysterectomy. Medical history   includes fibrocystic breast and breast cancer.    RISK ASSESSMENT:   Tyrer-zick risk assessment reporting was suppressed due to the patient's   history and/or demographic factors.    TISSUE DENSITY:   There are scattered areas of fibroglandular density.     INDICATION: An Mcwilliams is a 68 y.o. female presenting for history of   breast cancer.    FINDINGS:   LEFT  1) POST-SURGICAL FINDING [D]: There are post-surgical findings from a   previous lumpectomy with radiation seen in the left breast.     BILATERAL  There are no suspicious masses, grouped microcalcifications or areas of   unexplained architectural distortion. The skin and nipple areolar complex   are unremarkable.  Biopsy marker clip is noted in the left breast.    Benign-appearing calcifications are noted in each breast.  Impression:    No evidence of malignancy.    ASSESSMENT/BI-RADS CATEGORY:  Left: 2 - Benign  Right: 2 - Benign  Overall: 2 - Benign    RECOMMENDATION:       - Routine screening mammogram in 1 year for both breasts.    Workstation ID: [id number]      Labs:  [facility] and [facility].     Lab Results    Component Value Date    HGB 12.7 11/14/2021    HCT 39.0 11/14/2021    MCV 96 11/14/2021     11/14/2021    WBC 6.35 11/14/2021    NRBC 0 11/14/2021     Lab Results   Component Value Date    K 4.0 10/18/2023     10/18/2023    CO2 24 10/18/2023    BUN 12 10/18/2023    CREATININE 0.87 10/18/2023    GLUF 90 08/14/2021    CALCIUM 9.3 10/18/2023    AST 26 08/14/2021    ALT 44 08/14/2021    ALKPHOS 77 08/14/2021    EGFR 72 10/18/2023     Disclaimer: This document was prepared using [employer] technology. If a word or phrase is confusing, or does not make sense, this is likely due to recognition error which was not discovered during this clinician's review. If you believe an error has occurred, please contact me through [employer] service line for felicia?cation.

## 2024-11-07 ENCOUNTER — OFFICE VISIT (OUTPATIENT)
Dept: HEMATOLOGY ONCOLOGY | Facility: CLINIC | Age: 69
End: 2024-11-07
Payer: MEDICARE

## 2024-11-07 VITALS
WEIGHT: 153 LBS | HEART RATE: 88 BPM | SYSTOLIC BLOOD PRESSURE: 116 MMHG | TEMPERATURE: 97.4 F | HEIGHT: 64 IN | DIASTOLIC BLOOD PRESSURE: 82 MMHG | BODY MASS INDEX: 26.12 KG/M2 | OXYGEN SATURATION: 98 % | RESPIRATION RATE: 16 BRPM

## 2024-11-07 DIAGNOSIS — Z17.0 MALIGNANT NEOPLASM OF UPPER-OUTER QUADRANT OF LEFT BREAST IN FEMALE, ESTROGEN RECEPTOR POSITIVE (HCC): Primary | ICD-10-CM

## 2024-11-07 DIAGNOSIS — Z79.811 USE OF AROMATASE INHIBITORS: ICD-10-CM

## 2024-11-07 DIAGNOSIS — C50.412 MALIGNANT NEOPLASM OF UPPER-OUTER QUADRANT OF LEFT BREAST IN FEMALE, ESTROGEN RECEPTOR POSITIVE (HCC): Primary | ICD-10-CM

## 2024-11-07 PROCEDURE — 99214 OFFICE O/P EST MOD 30 MIN: CPT | Performed by: INTERNAL MEDICINE

## 2024-11-23 DIAGNOSIS — Z17.0 MALIGNANT NEOPLASM OF UPPER-OUTER QUADRANT OF LEFT BREAST IN FEMALE, ESTROGEN RECEPTOR POSITIVE (HCC): ICD-10-CM

## 2024-11-23 DIAGNOSIS — C50.412 MALIGNANT NEOPLASM OF UPPER-OUTER QUADRANT OF LEFT BREAST IN FEMALE, ESTROGEN RECEPTOR POSITIVE (HCC): ICD-10-CM

## 2024-11-25 RX ORDER — EXEMESTANE 25 MG/1
25 TABLET ORAL DAILY
Qty: 90 TABLET | Refills: 0 | Status: SHIPPED | OUTPATIENT
Start: 2024-11-25

## 2024-12-11 DIAGNOSIS — Q66.72 PES CAVUS OF BOTH FEET: ICD-10-CM

## 2024-12-11 DIAGNOSIS — M21.621 TAILOR'S BUNIONETTE, RIGHT: ICD-10-CM

## 2024-12-11 DIAGNOSIS — Q66.71 PES CAVUS OF BOTH FEET: ICD-10-CM

## 2024-12-11 RX ORDER — MELOXICAM 15 MG/1
15 TABLET ORAL DAILY
Qty: 60 TABLET | Refills: 0 | Status: SHIPPED | OUTPATIENT
Start: 2024-12-11 | End: 2025-02-09

## 2025-04-04 DIAGNOSIS — C50.412 MALIGNANT NEOPLASM OF UPPER-OUTER QUADRANT OF LEFT BREAST IN FEMALE, ESTROGEN RECEPTOR POSITIVE (HCC): ICD-10-CM

## 2025-04-04 DIAGNOSIS — Z17.0 MALIGNANT NEOPLASM OF UPPER-OUTER QUADRANT OF LEFT BREAST IN FEMALE, ESTROGEN RECEPTOR POSITIVE (HCC): ICD-10-CM

## 2025-04-04 RX ORDER — EXEMESTANE 25 MG/1
25 TABLET ORAL DAILY
Qty: 90 TABLET | Refills: 0 | Status: SHIPPED | OUTPATIENT
Start: 2025-04-04

## 2025-05-13 ENCOUNTER — OFFICE VISIT (OUTPATIENT)
Dept: HEMATOLOGY ONCOLOGY | Facility: CLINIC | Age: 70
End: 2025-05-13
Payer: MEDICARE

## 2025-05-13 VITALS
HEIGHT: 64 IN | SYSTOLIC BLOOD PRESSURE: 126 MMHG | OXYGEN SATURATION: 99 % | RESPIRATION RATE: 16 BRPM | BODY MASS INDEX: 26.46 KG/M2 | HEART RATE: 68 BPM | TEMPERATURE: 97.3 F | DIASTOLIC BLOOD PRESSURE: 70 MMHG | WEIGHT: 155 LBS

## 2025-05-13 DIAGNOSIS — Z79.811 USE OF AROMATASE INHIBITORS: ICD-10-CM

## 2025-05-13 DIAGNOSIS — Z17.0 MALIGNANT NEOPLASM OF UPPER-OUTER QUADRANT OF LEFT BREAST IN FEMALE, ESTROGEN RECEPTOR POSITIVE (HCC): Primary | ICD-10-CM

## 2025-05-13 DIAGNOSIS — C50.412 MALIGNANT NEOPLASM OF UPPER-OUTER QUADRANT OF LEFT BREAST IN FEMALE, ESTROGEN RECEPTOR POSITIVE (HCC): Primary | ICD-10-CM

## 2025-05-13 PROCEDURE — 99214 OFFICE O/P EST MOD 30 MIN: CPT | Performed by: INTERNAL MEDICINE

## 2025-05-13 RX ORDER — EXEMESTANE 25 MG/1
25 TABLET ORAL DAILY
Qty: 90 TABLET | Refills: 1 | Status: SHIPPED | OUTPATIENT
Start: 2025-05-13

## 2025-05-13 NOTE — ASSESSMENT & PLAN NOTE
1.  Left breast cancer, stage IB (pT1b, pN1a (mi), M0) grade 1, ER 90% positive, IL negative, HER2 fish negative disease.  Oncotype DX recurrence score 14.   Diagnosed in August 2021.       An Mcwilliams is a 69 y.o. postmenopausal female with stage IB left breast cancer, grade 1, ER 90% positive, IL negative, HER2 fish negative disease.  She underwent lumpectomy and sentinel lymph node biopsy, resulting in LOE.  She had 1 positive lymph node with micrometastasis.  Her Oncotype DX recurrence score 14.  She completed adjuvant radiation.  She was previously taking anastrozole, but due to musculoskeletal symptoms switched to exemestane which she continues to take.  She continues to do well from oncology perspective.  No clinical evidence of disease recurrence.  She will continue exemestane 25 mg daily.  She continues to follow with radiation oncology and breast surgery.  Recommend calcium and vitamin D supplementation with close bone health monitoring with her PCP.    Office follow-up in 6 months.     Orders:    exemestane (AROMASIN) 25 MG tablet; Take 1 tablet (25 mg total) by mouth daily    CBC and differential; Future    Comprehensive metabolic panel; Future

## 2025-05-13 NOTE — PROGRESS NOTES
Name: An Mcwilliams      : 1955      MRN: [id number]  Encounter Provider: Erin Noland DO  Encounter Date: 2025   Encounter department: [facility]  :  Assessment & Plan  Malignant neoplasm of upper-outer quadrant of left breast in female, estrogen receptor positive (HCC)    1.  Left breast cancer, stage IB (pT1b, pN1a (mi), M0) grade 1, ER 90% positive, DC negative, HER2 fish negative disease.  Oncotype DX recurrence score 14.   Diagnosed in 2021.       An Mcwilliams is a 69 y.o. postmenopausal female with stage IB left breast cancer, grade 1, ER 90% positive, DC negative, HER2 fish negative disease.  She underwent lumpectomy and sentinel lymph node biopsy, resulting in LEO.  She had 1 positive lymph node with micrometastasis.  Her Oncotype DX recurrence score 14.  She completed adjuvant radiation.  She was previously taking anastrozole, but due to musculoskeletal symptoms switched to exemestane which she continues to take.  She continues to do well from oncology perspective.  No clinical evidence of disease recurrence.  She will continue exemestane 25 mg daily.  She continues to follow with radiation oncology and breast surgery.  Recommend calcium and vitamin D supplementation with close bone health monitoring with her PCP.    Office follow-up in 6 months.     Orders:    exemestane (AROMASIN) 25 MG tablet; Take 1 tablet (25 mg total) by mouth daily    CBC and differential; Future    Comprehensive metabolic panel; Future    Use of aromatase inhibitors    Orders:    CBC and differential; Future    Comprehensive metabolic panel; Future        Return in about 6 months (around 2025) for Office Visit, Labs.    History of Present Illness   Chief Complaint   Patient presents with    Follow-up     Oncology History   Cancer Staging   Malignant neoplasm of upper-outer quadrant of left breast in female, estrogen receptor positive (HCC)  Staging  form: Breast, AJCC 8th Edition  - Clinical: Stage IA (cT1a, cN0, cM0, G1, ER+, NJ-, HER2-) - Signed by Tracie Nath MD on 8/5/2021  Stage prefix: Initial diagnosis  Method of lymph node assessment: Clinical  Histologic grading system: 3 grade system  - Pathologic: Stage IA (pT1b, pN1mi(sn), cM0, G1, ER+, NJ-, HER2-) - Signed by Tracie Nath MD on 9/9/2021  Stage prefix: Initial diagnosis  Method of lymph node assessment: [place] lymph node biopsy  Histologic grading system: 3 grade system  Oncology History   Malignant neoplasm of upper-outer quadrant of left breast in female, estrogen receptor positive (HCC)   7/12/2021 Initial Diagnosis    Malignant neoplasm of upper-outer quadrant of left breast in female, estrogen receptor positive (HCC)     7/12/2021 Biopsy    A. Breast, left at 02:00 o'clock 7 cm from nipple, ultrasound-guided biopsy (3 passes):  -  Invasive breast carcinoma of no special type (ductal NST/invasive ductal carcinoma).  - grade 1  - ER 95%, NJ 0%, HER2 2+, equivocal by IHC but negative on the fish analysis    B. Breast, left at 02:00 o'clock 3 cm from nipple, ultrasound-guided biopsy (2 passes):  -  Benign breast parenchyma with fibrocystic changes including apocrine metaplasia, usual ductal hyperplasia, bold all and microcyst formation with a prominent sclerosed and cystic nodule, cannot exclude old granuloma versus sclerosed cyst.  -  Negative for dysplasia or carcinoma.      8/5/2021 -  Cancer Staged    Staging form: Breast, AJCC 8th Edition  - Clinical: Stage IA (cT1a, cN0, cM0, G1, ER+, NJ-, HER2-) - Signed by Tracie Nath MD on 8/5/2021  Stage prefix: Initial diagnosis  Method of lymph node assessment: Clinical  Histologic grading system: 3 grade system       8/26/2021 Surgery    Left breast lumpectomy with SLNB  Surgeon: Dr Tracie BUSTILLOS Left breast, lumpectomy:  - Invasive breast carcinoma of no special type (ductal NST/invasive ductal carcinoma).  - Ductal carcinoma in situ  (DCIS).  - All margins are negative for carcinoma or DCIS.     B. Left breast sentinel lymph node, excision:  - One of three lymph nodes with micrometastases (pN1mi).     Note: Small focus (1 mm) metastatic carcinoma present in permanent section only.     C. Left breast, new superior margin, re-excision:  - Benign fibroadipose tissue.  - Negative for malignancy.     D. Left breast, new medial margin, re-excision:  - Benign breast parenchyma and fibroadipose tissue.  - Negative for malignancy.     8/26/2021 -  Radiation    Treatments not in [facility])  Field Numbers Energy Treatment Site Starting  Ending  Elapsed  Fraction Total  Overlap Site Overlap         Date Date Days Dose Dose   Dose   IORT  50KVp  Left breast  8/26/21 8/26/21 2000 2000 9/9/2021 -  Cancer Staged    Staging form: Breast, AJCC 8th Edition  - Pathologic: Stage IA (pT1b, pN1mi(sn), cM0, G1, ER+, CT-, HER2-) - Signed by Tracie Nath MD on 9/9/2021  Stage prefix: Initial diagnosis  Method of lymph node assessment: [place] lymph node biopsy  Histologic grading system: 3 grade system       11/9/2021 - 12/14/2021 Radiation    BH L BREAST 6X 25 / 25 200 0 5,000 35      Treatment Dates:  11/9/2021 - 12/14/2021.         Primary Diagnosis:  Left breast cancer, stage IB (pT1b, pN1a(mi), M0) grade 1, ER 90% positive, CT negative, HER2 fish negative disease.  Oncotype DX recurrence score 14.   Diagnosed in August 2021.     Previous Hematologic/ Oncologic Treatment:   Adjuvant hormonal therapy with anastrozole from October 2021 through October 2022.     Current Hematologic/ Oncologic Treatment:    Adjuvant hormonal therapy with exemestane since October 2022.    Pertinent Medical History   An Mcwilliams is a 68 year-old postmenopausal woman who was found to have radiographic abnormality in her right breast based on a screening mammography.  Therefore, she underwent right breast biopsy in July 12, 2021, which showed invasive ductal carcinoma, grade  1. This was ER 90% positive, [place] negative, HER2 2+ disease.  HER2 fish was negative for gene application with ratio of 1.0.  She subsequent underwent lumpectomy and sentinel lymph node biopsy by Dr. Nath in August 26, 2021. She had 9 x 8 x 5 mm of invasive ductal carcinoma, grade 1. Lymphovascular invasion was present.  1/3 sentinel lymph nodes or had micrometastasis measuring 1 mm.  There was no evidence of extranodal extension.  She had intraoperative radiation therapy at the time of lumpectomy.  Her Oncotype DX recurrence score was 14.  Therefore, adjuvant chemotherapy was not indicated. since October 2021, she was on adjuvant hormonal therapy with anastrozole until October 2022, when she developed significant musculoskeletal symptoms.  Therefore, she switched her hormonal therapy to exemestane.  She was previously following Dr. Gurrola last seen in the office on 5/3/2023.  She presents today for follow-up/transfer of care visit.  She continues to take exemestane without any adverse effects.  She denies any chest pain, palpitations or respiratory symptoms.  Denies any bone pain.  Weight remains stable.  She does note intermittent insomnia.     05/13/25:   Follow-up visit for breast cancer.  Continues treatment well exemestane.  Denies any breast related complaints.  Denies any respiratory symptoms or bone pain.  Undergoing PT for low back pain.        Review of Systems   Constitutional:  Negative for chills and fever.   Respiratory:  Negative for cough and shortness of breath.    Cardiovascular:  Negative for chest pain and palpitations.   Gastrointestinal:  Negative for abdominal pain.   Genitourinary:  Negative for hematuria.   Skin:  Negative for rash.   Hematological:  Does not bruise/bleed easily.   All other systems reviewed and are negative.          Objective   /70 (BP Location: Right arm, Patient Position: Sitting, Cuff Size: Adult)   Pulse 68   Temp (!) 97.3 °F (36.3 °C) (Temporal)   Resp 16   " Ht 5' 4\" (1.626 m)   Wt 70.3 kg (155 lb)   SpO2 99%   BMI 26.61 kg/m²     Pain Screening:  Pain Score: 0-No pain  ECOG     Physical Exam  Vitals reviewed.   Constitutional:       General: She is not in acute distress.     Appearance: Normal appearance.   HENT:      Head: Normocephalic and atraumatic.      Mouth/Throat:      Mouth: Mucous membranes are moist.   Eyes:      Extraocular Movements: Extraocular movements intact.      Conjunctiva/sclera: Conjunctivae normal.   Cardiovascular:      Rate and Rhythm: Normal rate.   Pulmonary:      Effort: Pulmonary effort is normal. No respiratory distress.   Chest:      Comments: Left breast: Lumpectomy scar at 3:00.  No palpable abnormality.  No palpable supra/infraclavicular/axillary LAD.     Right breast: Negative exam    Abdominal:      General: Abdomen is flat. There is no distension.      Palpations: Abdomen is soft.   Musculoskeletal:      Cervical back: Normal range of motion and neck supple.      Right lower leg: No edema.      Left lower leg: No edema.   Skin:     General: Skin is warm.      Coloration: Skin is not pale.   Neurological:      Mental Status: She is alert and oriented to person, place, and time. Mental status is at baseline.      Cranial Nerves: No cranial nerve deficit.   Psychiatric:         Thought Content: Thought content normal.         Labs: I have reviewed the following labs:  No results found for: \"WBC\", \"RBC\", \"HGB\", \"HCT\", \"MCV\", \"MCH\", \"RDW\", \"PLT\", \"NEUTOPHILPCT\", \"LYMPHOPCT\", \"MONOPCT\", \"EOSPCT\", \"BASOPCT\", \"IMMGRANS\", \"NEUTROABS\"  No results found for: \"NA\", \"K\", \"CL\", \"CO2\", \"ANIONGAP\", \"BUN\", \"CREATININE\", \"GLUCOSE\", \"GLUF\", \"CALCIUM\", \"CORRECTEDCA\", \"AST\", \"ALT\", \"ALKPHOS\", \"TP\", \"ALB\", \"TBILI\", \"EGFR\"  Pathology Result:          Final Diagnosis   Date Value Ref Range Status   08/26/2021     Final     A. Left breast, lumpectomy:  - Invasive breast carcinoma of no special type (ductal NST/invasive ductal carcinoma).  - Ductal " carcinoma in situ (DCIS).  - All margins are negative for carcinoma or DCIS.     B. Left breast sentinel lymph node, excision:  - One of three lymph nodes with micrometastases (pN1mi).     Note: Small focus (1 mm) metastatic carcinoma present in permanent section only.     C. Left breast, new superior margin, re-excision:  - Benign fibroadipose tissue.  - Negative for malignancy.     D. Left breast, new medial margin, re-excision:  - Benign breast parenchyma and fibroadipose tissue.  - Negative for malignancy.     Comment: Immunohistochemistry for E-cadherin, p120 show membranous pattern; p63 and calponin are negative.  Intradepartmental consultation is in agreement.  If clinically indicated, the most appropriate tissue block(s) for ancillary testing are block(s):  A6  Dr. Dr. Nath is notified of the diagnosis in EPIC via [employer] on 9/1/2021  at 4:50 pm .             Comment:       This is an appended report. These results have been appended to a previously preliminary verified report.   07/12/2021     Final     A. Breast, left at 02:00 o'clock 7 cm from nipple, ultrasound-guided biopsy (3 passes):  -  Invasive breast carcinoma of no special type (ductal NST/invasive ductal carcinoma).   * Emelia grade 1 of 3 (total score: 4 of 9)    -- tubule formation >75%, score 1    -- nuclear grade 2 of 3, score 2    -- mitoses < 3/mm2, (</= 7 mitoses/10HPF), score 1.   * Confirmed by tumor cell immunophenotype:    -- negative:   calponin-B, S100.    * Invasive carcinoma involves 3 of 3 submitted core biopsies, max. dimension = 6 millimeters.   * Estrogen, progesterone & HER2 receptor studies pending, to be described in a separate receptor report.    * Ductal carcinoma in situ (DCIS): Not identified.    * Lymph-vascular invasion: Not identified.    * Microcalcifications: Present.     B. Breast, left at 02:00 o'clock 3 cm from nipple, ultrasound-guided biopsy (2 passes):  -  Benign breast parenchyma with fibrocystic  changes including apocrine metaplasia, usual ductal hyperplasia, bold all and microcyst formation with a prominent sclerosed and cystic nodule, cannot exclude old granuloma versus sclerosed cyst.  -  Negative for dysplasia or carcinoma.   -  Immunohistochemical stain performed with appropriate controls for CK5/6 shows mosaic epithelial staining in the hyperplasia, supporting the diagnosis.       Note:    1. Intradepartmental consultation concurs   2. Final report is telephonically messaged to Ina Vu on 7/16/21 at 1212.           Mammo diagnostic bilateral w 3d & cad  Narrative: DIAGNOSIS: Abnormal mammogram      TECHNIQUE:  Digital diagnostic mammography was performed. Computer Aided Detection   (CAD) analyzed all applicable images.     COMPARISONS: Prior breast imaging dated: 06/20/2022, 08/26/2021,   08/19/2021, 08/19/2021, 07/12/2021, 07/12/2021, 07/12/2021, 07/06/2021,   07/06/2021, 06/18/2021, 06/11/2020, 06/10/2019, 05/29/2018, 05/15/2017,   05/11/2016, 05/04/2015, and 04/28/2014     RELEVANT HISTORY:   Family Breast Cancer History: History of breast cancer in Neg Hx.  Family Medical History: No known relevant family medical history.   Personal History: Hormone history includes birth control. Surgical history   includes breast biopsy and lumpectomy. Medical history includes   fibrocystic breast.     RISK ASSESSMENT:   5 Year Tyrer-Cuzick: 0.7 %  10 Year Tyrer-Cuzick: 1.42 %  Lifetime Tyrer-Cuzick: 2.6 %     TISSUE DENSITY:   The breasts are almost entirely fatty.      INDICATION: An Mcwilliams is a 67 y.o. female presenting for abnormal   mammogram .     FINDINGS:   Bilateral  There are no suspicious masses, grouped microcalcifications or areas of   unexplained architectural distortion. The skin and nipple areolar complex   are unremarkable.    Stable postop changes on the left with large postop seroma in the left   axillary tail region.  Impression:    Stable exam.     ASSESSMENT/BI-RADS CATEGORY:      Overall: 2 - Benign     RECOMMENDATION:       - Diagnostic mammogram in 1 year for both breasts.     Workstation ID: [id number]

## 2025-06-02 ENCOUNTER — OFFICE VISIT (OUTPATIENT)
Dept: SURGICAL ONCOLOGY | Facility: CLINIC | Age: 70
End: 2025-06-02
Payer: MEDICARE

## 2025-06-02 VITALS
BODY MASS INDEX: 26.46 KG/M2 | RESPIRATION RATE: 16 BRPM | DIASTOLIC BLOOD PRESSURE: 80 MMHG | WEIGHT: 155 LBS | HEIGHT: 64 IN | OXYGEN SATURATION: 99 % | TEMPERATURE: 98.1 F | SYSTOLIC BLOOD PRESSURE: 124 MMHG | HEART RATE: 72 BPM

## 2025-06-02 DIAGNOSIS — Z79.811 USE OF AROMATASE INHIBITORS: ICD-10-CM

## 2025-06-02 DIAGNOSIS — C50.412 MALIGNANT NEOPLASM OF UPPER-OUTER QUADRANT OF LEFT BREAST IN FEMALE, ESTROGEN RECEPTOR POSITIVE (HCC): Primary | ICD-10-CM

## 2025-06-02 DIAGNOSIS — Z17.0 MALIGNANT NEOPLASM OF UPPER-OUTER QUADRANT OF LEFT BREAST IN FEMALE, ESTROGEN RECEPTOR POSITIVE (HCC): Primary | ICD-10-CM

## 2025-06-02 PROBLEM — N64.89 SEROMA OF BREAST: Status: RESOLVED | Noted: 2021-11-04 | Resolved: 2025-06-02

## 2025-06-02 PROCEDURE — 99214 OFFICE O/P EST MOD 30 MIN: CPT | Performed by: SURGERY

## 2025-06-02 NOTE — PROGRESS NOTES
Name: An Mcwilliams      : 1955      MRN: [id number]  Encounter Provider: Tracie Nath MD  Encounter Date: 2025   Encounter department: [facility] [place]  :  Assessment & Plan  Malignant neoplasm of upper-outer quadrant of left breast in female, estrogen receptor positive (HCC)         Use of aromatase inhibitors         69-year-old female status post left breast conservation for a T1b N1 GANESH invasive ductal carcinoma.  She had intraoperative radiation followed by whole breast radiation.  She is currently on Aromasin.  There is no evidence of malignancy based on exam today.  Her last mammogram was benign.  She would like to continue diagnostic mammography for the full 5 years.  We will see her again in 6 months for survivorship visit or sooner should the need arise.      History of Present Illness   An Mcwilliams is a 69 y.o. year old female who presents for breast cancer follow-up.  She continues on Aromasin.  She reports occasional discomfort that she thinks is more along the chest wall from radiation therapy.  Her last mammogram was benign.     Oncology History   Cancer Staging   Malignant neoplasm of upper-outer quadrant of left breast in female, estrogen receptor positive (HCC)  Staging form: Breast, AJCC 8th Edition  - Clinical: Stage IA (cT1a, cN0, cM0, G1, ER+, [place]-, HER2-) - Signed by Tracie Nath MD on 2021  Stage prefix: Initial diagnosis  Method of lymph node assessment: Clinical  Histologic grading system: 3 grade system  - Pathologic: Stage IA (pT1b, pN1mi(sn), cM0, G1, ER+, [place]-, HER2-) - Signed by Tracie Nath MD on 2021  Stage prefix: Initial diagnosis  Method of lymph node assessment: [place] lymph node biopsy  Histologic grading system: 3 grade system  Oncology History   Malignant neoplasm of upper-outer quadrant of left breast in female, estrogen receptor positive (HCC)   2021 Initial Diagnosis    Malignant neoplasm of  upper-outer quadrant of left breast in female, estrogen receptor positive (HCC)     7/12/2021 Biopsy    A. Breast, left at 02:00 o'clock 7 cm from nipple, ultrasound-guided biopsy (3 passes):  -  Invasive breast carcinoma of no special type (ductal NST/invasive ductal carcinoma).  - grade 1  - ER 95%, RI 0%, HER2 2+, equivocal by IHC but negative on the fish analysis    B. Breast, left at 02:00 o'clock 3 cm from nipple, ultrasound-guided biopsy (2 passes):  -  Benign breast parenchyma with fibrocystic changes including apocrine metaplasia, usual ductal hyperplasia, bold all and microcyst formation with a prominent sclerosed and cystic nodule, cannot exclude old granuloma versus sclerosed cyst.  -  Negative for dysplasia or carcinoma.      8/5/2021 -  Cancer Staged    Staging form: Breast, AJCC 8th Edition  - Clinical: Stage IA (cT1a, cN0, cM0, G1, ER+, [place]-, HER2-) - Signed by Tracie Nath MD on 8/5/2021  Stage prefix: Initial diagnosis  Method of lymph node assessment: Clinical  Histologic grading system: 3 grade system       8/26/2021 Surgery    Left breast lumpectomy with SLNB  Surgeon: Dr Tracie Nath    A. Left breast, lumpectomy:  - Invasive breast carcinoma of no special type (ductal NST/invasive ductal carcinoma).  - Ductal carcinoma in situ (DCIS).  - All margins are negative for carcinoma or DCIS.     B. Left breast sentinel lymph node, excision:  - One of three lymph nodes with micrometastases (pN1mi).     Note: Small focus (1 mm) metastatic carcinoma present in permanent section only.     C. Left breast, new superior margin, re-excision:  - Benign fibroadipose tissue.  - Negative for malignancy.     D. Left breast, new medial margin, re-excision:  - Benign breast parenchyma and fibroadipose tissue.  - Negative for malignancy.     8/26/2021 -  Radiation    Treatments not in [place])  Field Numbers Energy Treatment Site Starting  Ending  Elapsed  Fraction Total  Overlap Site Overlap         Date Date Days  Dose Dose   Dose   IORT  50KVp  Left breast  8/26/21 8/26/21 2000 2000 9/9/2021 -  Cancer Staged    Staging form: Breast, AJCC 8th Edition  - Pathologic: Stage IA (pT1b, pN1mi(sn), cM0, G1, ER+, [place]-, HER2-) - Signed by Tracie Nath MD on 9/9/2021  Stage prefix: Initial diagnosis  Method of lymph node assessment: [place] lymph node biopsy  Histologic grading system: 3 grade system       11/9/2021 - 12/14/2021 Radiation    BH L BREAST 6X 25 / 25 200 0 5,000 35      Treatment Dates:  11/9/2021 - 12/14/2021.         Review of Systems   Constitutional: Negative.  Negative for appetite change, fever and unexpected weight change.   HENT: Negative.  Negative for trouble swallowing.    Eyes: Negative.    Respiratory: Negative.  Negative for cough and shortness of breath.    Cardiovascular: Negative.  Negative for chest pain.   Gastrointestinal: Negative.  Negative for abdominal pain, nausea and vomiting.   Endocrine: Negative.    Genitourinary: Negative.  Negative for dysuria.   Musculoskeletal: Negative.  Negative for arthralgias and myalgias.   Skin: Negative.    Allergic/Immunologic: Negative.    Neurological: Negative.  Negative for headaches.   Hematological: Negative.  Negative for adenopathy. Does not bruise/bleed easily.   Psychiatric/Behavioral: Negative.      A complete review of systems is negative other than that noted above in the HPI.    Past Medical History   Past Medical History[1]  Past Surgical History[2]  Family History[3]   reports that she has never smoked. She has never been exposed to tobacco smoke. She has never used smokeless tobacco. She reports that she does not currently use alcohol. She reports that she does not use drugs.  Current Outpatient Medications   Medication Instructions    atorvastatin (LIPITOR) 10 mg tablet No dose, route, or frequency recorded.    Cholecalciferol (VITAMIN D-3 PO) 2,000 Int'l Units, Daily    exemestane (AROMASIN) 25 mg, Oral, Daily    PARoxetine  "(PAXIL) 20 mg, Every morning   Allergies[4]        Objective   /80 (BP Location: Right arm, Patient Position: Sitting, Cuff Size: Standard)   Pulse 72   Temp 98.1 °F (36.7 °C) (Temporal)   Resp 16   Ht 5' 4\" (1.626 m)   Wt 70.3 kg (155 lb)   SpO2 99%   BMI 26.61 kg/m²     Pain Screening:  Pain Score: 0-No pain  ECOG    Physical Exam  Constitutional:       General: She is not in acute distress.     Appearance: Normal appearance. She is well-developed.   HENT:      Head: Normocephalic and atraumatic.     Cardiovascular:      Heart sounds: Normal heart sounds.   Pulmonary:      Breath sounds: Normal breath sounds.   Chest:   Breasts:     Right: No swelling, bleeding, inverted nipple, mass, nipple discharge, skin change or tenderness.      Left: Skin change (Left lumpectomy) present. No swelling, bleeding, inverted nipple, mass, nipple discharge or tenderness.   Abdominal:      Palpations: Abdomen is soft.     Musculoskeletal:      Right lower leg: No edema.      Left lower leg: No edema.   Lymphadenopathy:      Upper Body:      Right upper body: No supraclavicular, axillary or pectoral adenopathy.      Left upper body: No supraclavicular, axillary or pectoral adenopathy.     Neurological:      Mental Status: She is alert and oriented to person, place, and time.     Psychiatric:         Mood and Affect: Mood normal.          Labs: I have reviewed pertinent labs.     No visits with results within 1 Month(s) from this visit.   Latest known visit with results is:   Hospital Outpatient Visit on 05/17/2024   Component Date Value Ref Range Status    Case Report 05/17/2024    Final                    Value:Surgical Pathology Report                         Case: [id number]                                  Authorizing Provider:  Gallo Dc MD       Collected:           05/17/2024 0916              Ordering Location:     [facility]        Received:            05/17/2024 1531                            "          [facility] [place]                                                    Pathologist:           Deven Barrientos MD                                                                           Specimens:   A) - Colon, ascending polyp cold snare                                                              B) - Colon, rectum  polyp cold snare                                                       Final Diagnosis 05/17/2024    Final                    Value:A. ASCENDING COLON, POLYP, POLYPECTOMY:    - Fragments of tubular adenoma.    - Negative for high-grade dysplasia.       B. RECTUM, POLYP, POLYPECTOMY:    - Polypoid fragment of colorectal mucosa within normal limits.    - Negative for chronic, active and microscopic colitides.    - Negative for granulomata, dysplasia and malignancy.             Additional Information 05/17/2024    Final                    Value:All reported additional testing was performed with appropriately reactive controls.  These tests were developed and their performance characteristics determined by [facility] or appropriate performing facility, though some tests may be performed on tissues which have not been validated for performance characteristics (such as staining performed on alcohol exposed cell blocks and decalcified tissues).  Results should be interpreted with caution and in the context of the patients’ clinical condition. These tests may not be cleared or approved by the U.S. Food and Drug Administration, though the FDA has determined that such clearance or approval is not necessary. These tests are used for clinical purposes and they should not be regarded as investigational or for research. This laboratory has been approved by CLIA 88, designated as a high-complexity laboratory and is qualified to perform these tests.    Interpretation performed at [facility]  "[address], [place] [address].      Synoptic Checklist 05/17/2024    Final                    Value:                            COLON/RECTUM POLYP FORM - GI - A                                                                                     :    Adenoma(s)      Gross Description 05/17/2024    Final                    Value:A. The specimen is received in formalin, labeled with the patient's name and hospital number, and is designated \" ascending colon polyp\".  The specimen consists of multiple tan soft tissue fragments measuring in aggregate of 0.7 x 0.5 x 0.2 cm.  Entirely submitted. One screened cassette.  B. The specimen is received in formalin, labeled with the patient's name and hospital number, and is designated \" rectum polyp\".  The specimen consists of 1 tan soft tissue fragment measuring 0.4 cm in greatest dimension.  Entirely submitted. One screened cassette.    Note: The estimated total formalin fixation time based upon information provided by the submitting clinician and the standard processing schedule is under 72 hours.  Louise      Clinical Information 05/17/2024    Final                    Value:FINDINGS:  · Medium, scattered diverticula of moderate severity in the descending colon, sigmoid colon and rectosigmoid  · One 10 mm adenomatous-appearing and semi-pedunculated polyp in the ascending colon; performed cold snare with complete en bloc removal and retrieved specimen; placed 1 clip successfully (clip is MRI conditional); hemostasis achieved  · One adenomatous-appearing and semi-pedunculated polyp measuring smaller than 5 mm in the rectum; performed cold snare with complete en bloc removal and retrieved specimen  · Internal small hemorrhoids           Radiology Results Review: I personally reviewed the following image studies in PACS and associated radiology reports: 6/25/2024 bilateral 3D diagnostic mammogram. My interpretation of the radiology " images/reports is: No evidence of malignancy.         [1]   Past Medical History:  Diagnosis Date    Acute URI     Anxiety     Arthritis     Atypical chest pain     Fibrocystic breast disease     High arches     Hypercholesterolemia     Osteopenia     Palpitations     Plantar fasciitis     Psoriasis     Rib pain on left side     Skin cancer 2023    squamous cell of right arm lesion removed    Viral conjunctivitis    [2]   Past Surgical History:  Procedure Laterality Date    BREAST BIOPSY      BREAST CYST ASPIRATION      BREAST LUMPECTOMY Left 2021    Procedure: BREAST CHELLY LOCALIZED LUMPECTOMY;  Surgeon: Tracie Nath MD;  Location: [facility];  Service: Surgical Oncology    COLONOSCOPY      FOOT SURGERY Left     cyst removal    HYSTERECTOMY      INTRAOPERATIVE RADIATION THERAPY (IORT) Left 2021    Procedure: BREAST INTRAOPERATIVE RADIATION THERAPY (IORT) BY DR SCANLON;  Surgeon: Tracie Nath MD;  Location[facility];  Service: Surgical Oncology    LYMPH NODE BIOPSY Left 2021    Procedure: BIOPSY LYMPH NODE SENTINEL (NUC MED INJECTION AT 0900);  Surgeon: Tracie Nath MD;  Location: [facility];  Service: Surgical Oncology    OOPHORECTOMY  10/27/23    SKIN LESION EXCISION  2023    removed from right arm- squamous cell carcinoma    TONSILLECTOMY      US BREAST NEEDLE LOC LEFT Left 2021    US GUIDANCE BREAST BIOPSY LEFT EACH ADDITIONAL Left 2021    US GUIDED BREAST BIOPSY LEFT COMPLETE Left 2021   [3]   Family History  Problem Relation Name Age of Onset    Skin cancer Mother      Skin cancer Father          age dx unk     Arthritis Father      Heart disease Father      No Known Problems Daughter      No Known Problems Maternal Grandmother      Lung cancer Maternal Grandfather          age dx unk     No Known Problems Paternal Grandmother      No Known Problems Paternal Grandfather      No Known Problems Maternal Aunt      No Known Problems Maternal Aunt       No Known Problems Maternal Aunt      No Known Problems Paternal Aunt      No Known Problems Cousin m     No Known Problems Cousin m     No Known Problems Cousin p     No Known Problems Cousin p     Breast cancer Neg Hx     [4] No Known Allergies

## (undated) DEVICE — BRA SURGICAL SZ MED (33-36)

## (undated) DEVICE — SUT MONOCRYL 4-0 PS-2 18 IN Y496G

## (undated) DEVICE — VIAL DECANTER

## (undated) DEVICE — X-DRAPE 12"X17" 0.25MM PB EQUIV W/CUTOUT STERILE X-RAY SHIELD 10/BOX: Brand: X-DRAPE

## (undated) DEVICE — PLUMEPEN PRO 10FT

## (undated) DEVICE — BETHLEHEM UNIVERSAL MINOR GEN: Brand: CARDINAL HEALTH

## (undated) DEVICE — INTENDED FOR TISSUE SEPARATION, AND OTHER PROCEDURES THAT REQUIRE A SHARP SURGICAL BLADE TO PUNCTURE OR CUT.: Brand: BARD-PARKER SAFETY BLADES SIZE 15, STERILE

## (undated) DEVICE — SHEATH, GUIDE, SAVI SCOUT®: Brand: SAVI SCOUT®

## (undated) DEVICE — SUT MONOCRYL 3-0 SH 27 IN Y416H

## (undated) DEVICE — DRAPE PROBE NEO-PROBE/ULTRASOUND

## (undated) DEVICE — 3M™ STERI-STRIP™ REINFORCED ADHESIVE SKIN CLOSURES, R1547, 1/2 IN X 4 IN (12 MM X 100 MM), 6 STRIPS/ENVELOPE: Brand: 3M™ STERI-STRIP™

## (undated) DEVICE — ADHESIVE SKIN HIGH VISCOSITY EXOFIN 1ML

## (undated) DEVICE — TUBING SUCTION 5MM X 12 FT

## (undated) DEVICE — SPECIMEN CONTAINER STERILE PEEL PACK

## (undated) DEVICE — ELECTRODE BLADE MOD E-Z CLEAN  2.75IN 7CM -0012AM

## (undated) DEVICE — COVER PROBE INTRAOPERATIVE 6 X 96 IN

## (undated) DEVICE — CHLORAPREP HI-LITE 26ML ORANGE

## (undated) DEVICE — DRAPE MICROSCOPE INTRABEAM 326090

## (undated) DEVICE — LIGHT HANDLE COVER SLEEVE DISP BLUE STELLAR

## (undated) DEVICE — DRAPE SHEET THREE QUARTER

## (undated) DEVICE — GAUZE SPONGES,16 PLY: Brand: CURITY

## (undated) DEVICE — PAD GROUNDING ADULT

## (undated) DEVICE — GLOVE SRG BIOGEL 6

## (undated) DEVICE — LIGACLIP MCA MULTIPLE CLIP APPLIERS, 20 MEDIUM CLIPS: Brand: LIGACLIP

## (undated) DEVICE — NEEDLE 25GA X 1 IN SAFETY GLIDE

## (undated) DEVICE — SUT SILK 2-0 SH 30 IN K833H

## (undated) DEVICE — MEDI-VAC YANK SUCT HNDL W/TPRD BULBOUS TIP: Brand: CARDINAL HEALTH